# Patient Record
Sex: MALE | Race: BLACK OR AFRICAN AMERICAN | NOT HISPANIC OR LATINO | Employment: OTHER | URBAN - METROPOLITAN AREA
[De-identification: names, ages, dates, MRNs, and addresses within clinical notes are randomized per-mention and may not be internally consistent; named-entity substitution may affect disease eponyms.]

---

## 2023-05-18 ENCOUNTER — APPOINTMENT (OUTPATIENT)
Dept: PRIMARY CARE | Facility: CLINIC | Age: 73
End: 2023-05-18
Payer: MEDICARE

## 2023-06-12 ENCOUNTER — APPOINTMENT (OUTPATIENT)
Dept: PRIMARY CARE | Facility: CLINIC | Age: 73
End: 2023-06-12
Payer: MEDICARE

## 2023-07-10 LAB
CHOLESTEROL (MG/DL) IN SER/PLAS: 155 MG/DL (ref 0–199)
CHOLESTEROL IN HDL (MG/DL) IN SER/PLAS: 43.5 MG/DL
CHOLESTEROL/HDL RATIO: 3.6
LDL: 98 MG/DL (ref 0–99)
TRIGLYCERIDE (MG/DL) IN SER/PLAS: 70 MG/DL (ref 0–149)
VLDL: 14 MG/DL (ref 0–40)

## 2023-12-04 ENCOUNTER — TELEPHONE (OUTPATIENT)
Dept: PRIMARY CARE | Facility: HOSPITAL | Age: 73
End: 2023-12-04
Payer: MEDICARE

## 2023-12-04 DIAGNOSIS — I48.91 ATRIAL FIBRILLATION, UNSPECIFIED TYPE (MULTI): Primary | ICD-10-CM

## 2023-12-04 RX ORDER — APIXABAN 5 MG/1
5 TABLET, FILM COATED ORAL 2 TIMES DAILY
Qty: 60 TABLET | Refills: 2 | Status: SHIPPED | OUTPATIENT
Start: 2023-12-04 | End: 2024-02-28 | Stop reason: SDUPTHER

## 2023-12-04 RX ORDER — APIXABAN 5 MG/1
5 TABLET, FILM COATED ORAL 2 TIMES DAILY
COMMUNITY
End: 2023-12-04 | Stop reason: SDUPTHER

## 2023-12-11 ENCOUNTER — HOSPITAL ENCOUNTER (OUTPATIENT)
Dept: RADIOLOGY | Facility: HOSPITAL | Age: 73
Discharge: HOME | End: 2023-12-11
Payer: MEDICARE

## 2023-12-11 DIAGNOSIS — C61 MALIGNANT NEOPLASM OF PROSTATE (MULTI): ICD-10-CM

## 2023-12-11 PROCEDURE — 3430000001 HC RX 343 DIAGNOSTIC RADIOPHARMACEUTICALS: Performed by: INTERNAL MEDICINE

## 2023-12-11 PROCEDURE — 78815 PET IMAGE W/CT SKULL-THIGH: CPT | Performed by: RADIOLOGY

## 2023-12-11 PROCEDURE — A9800 HC RX 343 DIAGNOSTIC RADIOPHARMACEUTICALS: HCPCS | Performed by: INTERNAL MEDICINE

## 2023-12-11 PROCEDURE — 78815 PET IMAGE W/CT SKULL-THIGH: CPT | Mod: PI

## 2023-12-11 RX ADMIN — KIT FOR THE PREPARATION OF GALLIUM GA 68 GOZETOTIDE 5.83 MILLICURIE: 25 INJECTION, POWDER, LYOPHILIZED, FOR SOLUTION INTRAVENOUS at 09:48

## 2023-12-14 DIAGNOSIS — C61 PROSTATE CANCER (MULTI): ICD-10-CM

## 2023-12-18 DIAGNOSIS — I10 HYPERTENSION, UNSPECIFIED TYPE: Primary | ICD-10-CM

## 2023-12-19 RX ORDER — AMLODIPINE BESYLATE 10 MG/1
10 TABLET ORAL DAILY
Qty: 90 TABLET | Refills: 3 | Status: SHIPPED | OUTPATIENT
Start: 2023-12-19 | End: 2024-02-08 | Stop reason: SDUPTHER

## 2023-12-20 ENCOUNTER — APPOINTMENT (OUTPATIENT)
Dept: HEMATOLOGY/ONCOLOGY | Facility: HOSPITAL | Age: 73
End: 2023-12-20
Payer: MEDICARE

## 2023-12-21 ENCOUNTER — OFFICE VISIT (OUTPATIENT)
Dept: HEMATOLOGY/ONCOLOGY | Facility: HOSPITAL | Age: 73
End: 2023-12-21
Payer: MEDICARE

## 2023-12-21 ENCOUNTER — LAB (OUTPATIENT)
Dept: LAB | Facility: HOSPITAL | Age: 73
End: 2023-12-21
Payer: MEDICARE

## 2023-12-21 VITALS
BODY MASS INDEX: 28.29 KG/M2 | SYSTOLIC BLOOD PRESSURE: 137 MMHG | HEART RATE: 57 BPM | DIASTOLIC BLOOD PRESSURE: 73 MMHG | WEIGHT: 208.56 LBS | RESPIRATION RATE: 16 BRPM | OXYGEN SATURATION: 99 % | TEMPERATURE: 97.7 F

## 2023-12-21 DIAGNOSIS — C61 PROSTATE CANCER (MULTI): ICD-10-CM

## 2023-12-21 DIAGNOSIS — C61 MALIGNANT NEOPLASM OF PROSTATE (MULTI): Primary | ICD-10-CM

## 2023-12-21 LAB
ALBUMIN SERPL BCP-MCNC: 4.2 G/DL (ref 3.4–5)
ALP SERPL-CCNC: 75 U/L (ref 33–136)
ALT SERPL W P-5'-P-CCNC: 15 U/L (ref 10–52)
ANION GAP SERPL CALC-SCNC: 10 MMOL/L (ref 10–20)
AST SERPL W P-5'-P-CCNC: 17 U/L (ref 9–39)
BILIRUB SERPL-MCNC: 0.7 MG/DL (ref 0–1.2)
BUN SERPL-MCNC: 14 MG/DL (ref 6–23)
CALCIUM SERPL-MCNC: 9 MG/DL (ref 8.6–10.3)
CHLORIDE SERPL-SCNC: 103 MMOL/L (ref 98–107)
CO2 SERPL-SCNC: 35 MMOL/L (ref 21–32)
CREAT SERPL-MCNC: 1.04 MG/DL (ref 0.5–1.3)
GFR SERPL CREATININE-BSD FRML MDRD: 76 ML/MIN/1.73M*2
GLUCOSE SERPL-MCNC: 116 MG/DL (ref 74–99)
HOLD SPECIMEN: NORMAL
POTASSIUM SERPL-SCNC: 3.6 MMOL/L (ref 3.5–5.3)
PROT SERPL-MCNC: 7.4 G/DL (ref 6.4–8.2)
PSA SERPL-MCNC: 5.97 NG/ML
SODIUM SERPL-SCNC: 144 MMOL/L (ref 136–145)
TESTOST SERPL-MCNC: 603 NG/DL (ref 240–1000)

## 2023-12-21 PROCEDURE — 84403 ASSAY OF TOTAL TESTOSTERONE: CPT

## 2023-12-21 PROCEDURE — 36415 COLL VENOUS BLD VENIPUNCTURE: CPT

## 2023-12-21 PROCEDURE — 99213 OFFICE O/P EST LOW 20 MIN: CPT | Performed by: INTERNAL MEDICINE

## 2023-12-21 PROCEDURE — 84153 ASSAY OF PSA TOTAL: CPT

## 2023-12-21 PROCEDURE — 80053 COMPREHEN METABOLIC PANEL: CPT

## 2023-12-21 PROCEDURE — 1126F AMNT PAIN NOTED NONE PRSNT: CPT | Performed by: INTERNAL MEDICINE

## 2023-12-21 PROCEDURE — 1159F MED LIST DOCD IN RCRD: CPT | Performed by: INTERNAL MEDICINE

## 2023-12-21 PROCEDURE — 1036F TOBACCO NON-USER: CPT | Performed by: INTERNAL MEDICINE

## 2023-12-21 PROCEDURE — 3008F BODY MASS INDEX DOCD: CPT | Performed by: INTERNAL MEDICINE

## 2023-12-21 RX ORDER — LOSARTAN POTASSIUM 100 MG/1
100 TABLET ORAL DAILY
COMMUNITY
End: 2024-02-08 | Stop reason: WASHOUT

## 2023-12-21 RX ORDER — CHOLECALCIFEROL (VITAMIN D3) 50 MCG
1 TABLET ORAL DAILY
COMMUNITY
Start: 2021-01-15

## 2023-12-21 RX ORDER — TAMSULOSIN HYDROCHLORIDE 0.4 MG/1
0.8 CAPSULE ORAL DAILY
COMMUNITY
End: 2024-02-06 | Stop reason: SDUPTHER

## 2023-12-21 RX ORDER — ASCORBIC ACID 500 MG
1 TABLET ORAL DAILY
COMMUNITY
Start: 2016-02-08 | End: 2024-02-29 | Stop reason: WASHOUT

## 2023-12-21 RX ORDER — PRAVASTATIN SODIUM 40 MG/1
40 TABLET ORAL NIGHTLY
COMMUNITY
End: 2024-02-08 | Stop reason: SDUPTHER

## 2023-12-21 RX ORDER — METOPROLOL TARTRATE 25 MG/1
25 TABLET, FILM COATED ORAL 2 TIMES DAILY
COMMUNITY
End: 2024-02-28 | Stop reason: SDUPTHER

## 2023-12-21 ASSESSMENT — PAIN SCALES - GENERAL: PAINLEVEL: 0-NO PAIN

## 2023-12-21 ASSESSMENT — PATIENT HEALTH QUESTIONNAIRE - PHQ9
1. LITTLE INTEREST OR PLEASURE IN DOING THINGS: NOT AT ALL
SUM OF ALL RESPONSES TO PHQ9 QUESTIONS 1 AND 2: 0
2. FEELING DOWN, DEPRESSED OR HOPELESS: NOT AT ALL

## 2023-12-21 ASSESSMENT — COLUMBIA-SUICIDE SEVERITY RATING SCALE - C-SSRS
6. HAVE YOU EVER DONE ANYTHING, STARTED TO DO ANYTHING, OR PREPARED TO DO ANYTHING TO END YOUR LIFE?: NO
1. IN THE PAST MONTH, HAVE YOU WISHED YOU WERE DEAD OR WISHED YOU COULD GO TO SLEEP AND NOT WAKE UP?: NO
2. HAVE YOU ACTUALLY HAD ANY THOUGHTS OF KILLING YOURSELF?: NO

## 2023-12-22 NOTE — PROGRESS NOTES
Patient ID: Christiano Masters is a 73 y.o. male.    Subjective    HPI  Patient is a 73 year old male with prostate cancer with. BCR after local definitive therapy with RT + 18 months of ADT for high-risk disease (iPSA11/cT1/GS8).    Treatment history:  Initially diagnosed Gl 4+4 iPSA 10.99 dx June 2014.    Started ADT in 2014 (ADT #1 7/25/14) and finished IMRT Jan 2015. Initially planned  for 2yrs ADT to be completed in June 2016 however due to weight gain and increased blood pressure,  stopped at 18 months  of ADT, last dose on 1/2016.     Today patient reports feeling well overall. He has no complaints. His main concern is what to do about his rising PSA and recent scan findings.  He denies any pain, SOB, diarrhea, constipation, fevers, chills.    PSA trend: 3.02 -> 2.84 -> 3.46 -> 5.97    PET 12/11/23:  IMPRESSION:  Intensely focal increased ileum 68 PSMA activity in the left anterior  transitional zone of the prostate gland. Finding would be consistent  with an active neoplastic process.    No other definite areas of abnormal PSMA activity to suggest an  active neoplastic/metastatic process are identified.      Objective         Physical Exam  GEN:  A&Ox3, no acute distress, appears comfortable.  Conversational and appropriate.  No confusion or gross mental status changes.  EYES: EOMI, non-injected sclera.  ENT: Moist mucous membranes, no apparent injuries or lesions.  CARDIO: Normal rate and regular rhythm. No murmurs, rubs, or gallops.  .  PULM: LCTAB, no accessory muscle use  SKIN: Warm and dry, no rashes or lesions.  NEURO: Cranial nerves II-XII grossly intact.   PSYCH: Appropriate mood and behavior, converses and responds appropriately during exam      Assessment/Plan   73 year old male with prostate cancer with BCR after local definitive therapy with RT + 18 months of ADT for high-risk disease (iPSA11/cT1/GS8) presenting for discussion of rising PSA and PET scan findings consistent with local active  neoplastic activity in the prostate gland. Extensive discussion was held today with patient regarding potential next steps, including the need for discussion at tumor board and the possibility of surgery or radiation after biopsy, given patients overall good health.    Plan:  - will discuss at next tumor board meeting regarding possibility of therapies for local control after prior RT.  - pending  tumor board outcomes, next step would be MRI of the prostate for possibility of biopsy, then meeting with urology and radiation oncology to discuss surgical and radiation options.          Darshan Hassan MD    TEACHING ATTENDING ATTESTATION    I saw and evaluated this patient with Resident/Fellow. I reviewed the resident/fellow's documentation and discussed the patient with the resident/fellow. I agree with the resident/fellow's medical decision making as documented in the note.     BCR after local definitive therapy with Rt and 18 months of ADT  T recovered and PSA rising  PET PSMA showed only uptake in gland with no distant mets  Discussed role of salvage local approaches including RP vs. Cryo vs re-radiation  Need to discuss in [facility] - if we proceed he will need MRI prostate and Prostate bx before we can define next steps  Pt verbalized agreement, have questions and wished to proceed as above      Darshan Hassan MD, FACP  Chief, Solid Tumor Oncology Division   Medical Oncology  Professor of Medicine and Urology  /[facility]

## 2024-01-18 ENCOUNTER — OFFICE VISIT (OUTPATIENT)
Dept: HEMATOLOGY/ONCOLOGY | Facility: HOSPITAL | Age: 74
End: 2024-01-18
Payer: MEDICARE

## 2024-01-18 VITALS
OXYGEN SATURATION: 100 % | WEIGHT: 212.3 LBS | RESPIRATION RATE: 18 BRPM | BODY MASS INDEX: 28.79 KG/M2 | HEART RATE: 63 BPM | DIASTOLIC BLOOD PRESSURE: 79 MMHG | SYSTOLIC BLOOD PRESSURE: 145 MMHG | TEMPERATURE: 97 F

## 2024-01-18 DIAGNOSIS — C61 MALIGNANT NEOPLASM OF PROSTATE (MULTI): ICD-10-CM

## 2024-01-18 PROCEDURE — 1125F AMNT PAIN NOTED PAIN PRSNT: CPT | Performed by: NURSE PRACTITIONER

## 2024-01-18 PROCEDURE — 3008F BODY MASS INDEX DOCD: CPT | Performed by: NURSE PRACTITIONER

## 2024-01-18 PROCEDURE — 99213 OFFICE O/P EST LOW 20 MIN: CPT | Performed by: NURSE PRACTITIONER

## 2024-01-18 PROCEDURE — 1036F TOBACCO NON-USER: CPT | Performed by: NURSE PRACTITIONER

## 2024-01-18 ASSESSMENT — PAIN SCALES - GENERAL: PAINLEVEL: 8

## 2024-01-18 NOTE — PROGRESS NOTES
Patient ID: Christiano Masters is a 73 y.o. male.    Patient is a 73 year old male with prostate cancer with. BCR after local definitive therapy with RT + 18 months of ADT for high-risk disease (iPSA11/cT1/GS8).     Treatment history:  Initially diagnosed Gl 4+4 iPSA 10.99 dx June 2014.    Started ADT in 2014 (ADT #1 7/25/14) and finished IMRT Jan 2015. Initially planned  for 2yrs ADT to be completed in June 2016 however due to weight gain and increased blood pressure,  stopped at 18 months  of ADT, last dose on 1/2016.       Subjective    HPI    Some decrease in energy at times which he attributes to having had a procedure for afib. He can do his ADLs etc.   Appetite is good.   Denies cough/fever.   Denies n/v.   Denies diarrhea.   Had a stuffy nose last night.   Notes low back pain x months.   Denies dysuria.       Objective    BSA: 2.21 meters squared  /79 (BP Location: Left arm, Patient Position: Sitting, BP Cuff Size: Large adult)   Pulse 63   Temp 36.1 °C (97 °F) (Temporal)   Resp 18   Wt 96.3 kg (212 lb 4.9 oz)   SpO2 100%   BMI 28.79 kg/m²      Physical Exam  Eyes:      General: No scleral icterus.  Neurological:      Mental Status: He is alert and oriented to person, place, and time.      Comments: Ambulates independently          Performance Status:  Symptomatic; fully ambulatory    Lab Results   Component Value Date    PSA 5.97 (H) 12/21/2023    PSA 3.46 08/17/2023    PSA 2.84 05/22/2023       Lab Results   Component Value Date    GLUCOSE 116 (H) 12/21/2023    CALCIUM 9.0 12/21/2023     12/21/2023    K 3.6 12/21/2023    CO2 35 (H) 12/21/2023     12/21/2023    BUN 14 12/21/2023    CREATININE 1.04 12/21/2023     Lab Results   Component Value Date    TESTOSTERONE 603 12/21/2023     Lab Results   Component Value Date    ALT 15 12/21/2023    AST 17 12/21/2023     (H) 05/19/2022    ALKPHOS 75 12/21/2023    BILITOT 0.7 12/21/2023 12/11/23 PET:   IMPRESSION:  Intensely focal  increased ileum 68 PSMA activity in the left anterior  transitional zone of the prostate gland. Finding would be consistent  with an active neoplastic process.      No other definite areas of abnormal PSMA activity to suggest an  active neoplastic/metastatic process are identified..          Assessment/Plan   Per Dr. Hassan's note from 12/21: BCR after local definitive therapy with Rt and 18 months of ADT  T recovered and PSA rising. PET PSMA showed only uptake in gland with no distant mets  Discussed role of salvage local approaches including RP vs. Cryo vs re-radiation  Need to discuss in [facility] - if we proceed he will need MRI prostate and Prostate bx before we can define next steps.    Discussed case with Dr. Hassan and he said recommendation is for MRI prostate and then for him to see Dr. Narvaez of radiation oncology.     Will tentatively have patient follow up with Dr. Hassan in about 3 mos with labs.     Encouraged patient to follow up with PCP re: chronic low back pain.     Patient provided with contact and last note from [facility] of cardiology as he was inquiring about follow up there. Encouraged him to contact that office for an appt/recommendations.      Diagnoses and all orders for this visit:  Malignant neoplasm of prostate (CMS/HCC)  -     Clinic Appointment Request Follow Up; KENNETH HASSAN  -     MR treatment planning prostate pelvis; Future  -     Referral to Radiation Oncology; Future  -     Clinic Appointment Request KENNETH HASSAN; Future  -     Prostate Specific Antigen; Future  -     Testosterone; Future           MARCIE Lopez-CNP

## 2024-02-02 ENCOUNTER — HOSPITAL ENCOUNTER (OUTPATIENT)
Dept: RADIOLOGY | Facility: HOSPITAL | Age: 74
Discharge: HOME | End: 2024-02-02
Payer: MEDICARE

## 2024-02-02 ENCOUNTER — TELEPHONE (OUTPATIENT)
Dept: PRIMARY CARE | Facility: HOSPITAL | Age: 74
End: 2024-02-02
Payer: MEDICARE

## 2024-02-02 DIAGNOSIS — C61 MALIGNANT NEOPLASM OF PROSTATE (MULTI): ICD-10-CM

## 2024-02-02 NOTE — TELEPHONE ENCOUNTER
Patient is asking for a refill on losartan potassium. Please give him enough to last until 2/8 visit

## 2024-02-05 ENCOUNTER — TELEPHONE (OUTPATIENT)
Dept: RADIATION ONCOLOGY | Facility: HOSPITAL | Age: 74
End: 2024-02-05
Payer: MEDICARE

## 2024-02-05 NOTE — TELEPHONE ENCOUNTER
Called pt. to remind of appointment on 2/6/2024 at 8:00 am with    Pt answered and confirmed appointment.

## 2024-02-06 ENCOUNTER — HOSPITAL ENCOUNTER (OUTPATIENT)
Dept: RADIATION ONCOLOGY | Facility: HOSPITAL | Age: 74
Setting detail: RADIATION/ONCOLOGY SERIES
Discharge: HOME | End: 2024-02-06
Payer: MEDICARE

## 2024-02-06 VITALS
HEART RATE: 53 BPM | HEIGHT: 72 IN | TEMPERATURE: 96.8 F | DIASTOLIC BLOOD PRESSURE: 98 MMHG | RESPIRATION RATE: 18 BRPM | WEIGHT: 215.17 LBS | SYSTOLIC BLOOD PRESSURE: 170 MMHG | BODY MASS INDEX: 29.14 KG/M2 | OXYGEN SATURATION: 98 %

## 2024-02-06 DIAGNOSIS — N13.9 URINARY OBSTRUCTION: Primary | ICD-10-CM

## 2024-02-06 DIAGNOSIS — C61 MALIGNANT NEOPLASM OF PROSTATE (MULTI): ICD-10-CM

## 2024-02-06 PROCEDURE — 99205 OFFICE O/P NEW HI 60 MIN: CPT | Performed by: STUDENT IN AN ORGANIZED HEALTH CARE EDUCATION/TRAINING PROGRAM

## 2024-02-06 PROCEDURE — 99215 OFFICE O/P EST HI 40 MIN: CPT | Performed by: STUDENT IN AN ORGANIZED HEALTH CARE EDUCATION/TRAINING PROGRAM

## 2024-02-06 RX ORDER — TAMSULOSIN HYDROCHLORIDE 0.4 MG/1
0.8 CAPSULE ORAL DAILY
Qty: 30 CAPSULE | Refills: 2 | Status: SHIPPED | OUTPATIENT
Start: 2024-02-06 | End: 2024-02-08 | Stop reason: SDUPTHER

## 2024-02-06 SDOH — ECONOMIC STABILITY: FOOD INSECURITY: WITHIN THE PAST 12 MONTHS, THE FOOD YOU BOUGHT JUST DIDN'T LAST AND YOU DIDN'T HAVE MONEY TO GET MORE.: NEVER TRUE

## 2024-02-06 SDOH — ECONOMIC STABILITY: FOOD INSECURITY: WITHIN THE PAST 12 MONTHS, YOU WORRIED THAT YOUR FOOD WOULD RUN OUT BEFORE YOU GOT MONEY TO BUY MORE.: NEVER TRUE

## 2024-02-06 ASSESSMENT — COLUMBIA-SUICIDE SEVERITY RATING SCALE - C-SSRS
1. IN THE PAST MONTH, HAVE YOU WISHED YOU WERE DEAD OR WISHED YOU COULD GO TO SLEEP AND NOT WAKE UP?: NO
6. HAVE YOU EVER DONE ANYTHING, STARTED TO DO ANYTHING, OR PREPARED TO DO ANYTHING TO END YOUR LIFE?: NO
2. HAVE YOU ACTUALLY HAD ANY THOUGHTS OF KILLING YOURSELF?: NO

## 2024-02-06 ASSESSMENT — ENCOUNTER SYMPTOMS
PSYCHIATRIC NEGATIVE: 1
GASTROINTESTINAL NEGATIVE: 1
CONSTITUTIONAL NEGATIVE: 1
DEPRESSION: 0
NEUROLOGICAL NEGATIVE: 1
BACK PAIN: 1
ENDOCRINE NEGATIVE: 1
RESPIRATORY NEGATIVE: 1
CARDIOVASCULAR NEGATIVE: 1
OCCASIONAL FEELINGS OF UNSTEADINESS: 0
LOSS OF SENSATION IN FEET: 0
EYES NEGATIVE: 1
HEMATOLOGIC/LYMPHATIC NEGATIVE: 1

## 2024-02-06 ASSESSMENT — PATIENT HEALTH QUESTIONNAIRE - PHQ9
SUM OF ALL RESPONSES TO PHQ9 QUESTIONS 1 AND 2: 0
2. FEELING DOWN, DEPRESSED OR HOPELESS: NOT AT ALL
1. LITTLE INTEREST OR PLEASURE IN DOING THINGS: NOT AT ALL

## 2024-02-06 ASSESSMENT — PAIN SCALES - GENERAL: PAINLEVEL: 0-NO PAIN

## 2024-02-06 NOTE — PROGRESS NOTES
Staff Physician: Trisha Narvaez MD PhD  Referring Physician: Yaneli Fagan APRN-CNP  Date of Service: 2/6/2024  MRN: [id number]    RADIATION ONCOLOGY CONSULT NOTE    IDENTIFYING DATA:   Cancer Staging   Malignant neoplasm of prostate (CMS/HCC)  Staging form: Prostate, AJCC 8th Edition  - Clinical stage from 6/11/2014: Stage IIC (cT1c, cN0, cM0, PSA: 11, Grade Group: 4) - Signed by Trisha Narvaez MD PhD on 2/6/2024    Problem List Items Addressed This Visit       Malignant neoplasm of prostate (CMS/HCC)    Relevant Orders    Referral to Radiation Oncology     Other Visit Diagnoses       Urinary obstruction    -  Primary    Relevant Medications    tamsulosin (Flomax) 0.4 mg 24 hr capsule        Mr. Christiano Masters is a 73-year-old with a history of prostate adenocarcinoma with biochemical recurrence, referred by Yaneli Fagan APRN-CNP, for evaluation and discussion of treatment recommendations.    HISTORY OF PRESENT ILLNESS:  He has a history of MGUS, 11/30/2022 bone survey without evidence of lytic lesions.    6/11/2014 systematic prostate biopsy noted [place] 4+4, PNI positive    11/10/2014 to 1/13/2015 completed 79.2Gy in 44 fractions (45Gy in 25fx to prostate and SV, 34.2Gy boost to prostate).    01/2016 last injection of ADT. Planned for 24m, stopped at 18m due to side effects (weight gain, HTN).    His PSA has been slowly rising, most recently 5.97 12/21/2023, testosterone 603     PSA   Latest Ref Rng <=4.00 ng/mL   9/17/2019 0.34    6/15/2020 0.37    12/15/2020 0.24    6/17/2021 0.47    12/20/2021 1.52    5/19/2022 1.75    7/7/2022 1.93    2/22/2023 3.02    5/22/2023 2.84    8/17/2023 3.46    12/21/2023 5.97 (H)      12/11/2023 [place] PSMA PET/CT noted uptake in the left anterior TZ, SUV 5.7, without other areas of focal uptake.    2/2/2024 treatment planning MRI without contrast.    Today, Mr. Christiano Masters is here by himself, with his daughter on the phone.  Symptomatically, he reports:    1.  Full  "independence in activities of daily living.  2.  No dyspnea on exertion.   3.  Normal appetite. No unintentional weight loss.   4.  Pain score: 0/10 - currently no pain, but has h/o chronic back pain, managed with tylenol PRN  5.  IPSS (International Prostate Symptom Score):   9 / 35, bother 3   - He is not experiencing urinary incontinence. Reports that he only takes flomax \"as needed\".    - He is not experiencing dysuria, hematuria, flank pain.   - Nocturia x4, incomplete emptying about half the time   6.  Sexual function (ALANNA) Score:  16 / 25    - Use of erectile dysfunction medications:  None  7.  Bowel function:  normal, 2-3x a day   - Denies hematochezia or pain.    - He does not have a history of hemorrhoids.    - He does not have a history of inflammatory bowel disease (Crohn's, Ulcerative Colitis).  - 03/2023 colonoscopy noted localized mild inflammation in the distal rectum, possibly 2/2 radiation proctitis.   8. He denies a personal history of MI or stroke.   Vitals 170/98 seeing PCP on 2/8    PAST MEDICAL HISTORY:  Past Medical History:   Diagnosis Date    A-fib (CMS/[facility])     on Eliquis    Hypercholesteremia     Hypertension     Personal history of irradiation     Personal history of malignant neoplasm of prostate     History of malignant neoplasm of prostate     PAST SURGICAL HISTORY:  Past Surgical History:   Procedure Laterality Date    OTHER SURGICAL HISTORY  08/26/2019    Colonoscopy    OTHER SURGICAL HISTORY  11/15/2021    Hernia repair     ALLERGIES:  No Known Allergies  MEDICATIONS:    Current Outpatient Medications:     amLODIPine (Norvasc) 10 mg tablet, TAKE 1 TABLET BY MOUTH EVERY DAY, Disp: 90 tablet, Rfl: 3    ascorbic acid (Vitamin C) 500 mg tablet, Take 1 tablet (500 mg) by mouth once daily., Disp: , Rfl:     cholecalciferol (Vitamin D-3) 50 MCG (2000 UT) tablet, Take 1 tablet (2,000 Units) by mouth once daily., Disp: , Rfl:     Eliquis 5 mg tablet, Take 1 tablet (5 mg) by mouth 2 times a " day., Disp: 60 tablet, Rfl: 2    losartan (Cozaar) 100 mg tablet, Take 1 tablet (100 mg) by mouth once daily., Disp: , Rfl:     metoprolol tartrate (Lopressor) 25 mg tablet, Take 1 tablet (25 mg) by mouth 2 times a day., Disp: , Rfl:     pravastatin (Pravachol) 40 mg tablet, Take 1 tablet (40 mg) by mouth once daily at bedtime., Disp: , Rfl:     tamsulosin (Flomax) 0.4 mg 24 hr capsule, Take 2 capsules (0.8 mg) by mouth once daily., Disp: 30 capsule, Rfl: 2   SOCIAL HISTORY:  Social History     Tobacco Use    Smoking status: Never    Smokeless tobacco: Never   Substance Use Topics    Alcohol use: Never     FAMILY HISTORY:  No family history on file.    REVIEW OF SYSTEMS:  A 10 point review of systems was reviewed with pertinent positives and negatives noted in HPI. All other systems have been reviewed and are negative.  Review of Systems   Constitutional: Negative.    HENT:  Negative.     Eyes: Negative.    Respiratory: Negative.     Cardiovascular: Negative.    Gastrointestinal: Negative.    Endocrine: Negative.    Genitourinary:  Positive for nocturia.    Musculoskeletal:  Positive for back pain (chronic back pain).   Skin: Negative.    Neurological: Negative.    Hematological: Negative.    Psychiatric/Behavioral: Negative.       RADIATION SCREENING QUESTIONS:  Prior radiation therapy: Yes, describe: per HPI  Pacemaker: No  Other implantable devices: No  Connective tissue disease: No    PHYSICAL EXAMINATION:  BP (!) 170/98 (BP Location: Right arm, Patient Position: Sitting, BP Cuff Size: Small adult)   Pulse 53   Temp 36 °C (96.8 °F) (Skin)   Resp 18   Ht 1.829 m (6')   Wt 97.6 kg (215 lb 2.7 oz)   SpO2 98%   BMI 29.18 kg/m²   Constitutional: well developed, no distress, alert & oriented, cooperative  Eyes: pupils equal round and reactive to light, extraocular movements intact  Respiratory: normal work of breathing  Extremities: no clubbing or edema  Psychological: normal affect    PERFORMANCE  STATUS:  KPS/ECO, Fully active, able to carry on all pre-disease performed without restriction (ECOG equivalent 0)    LABORATORY AND IMAGING DATA:  Imaging: All imaging was personally reviewed and interpreted in clinic. Findings as per HPI and EMR.    Laboratory/Pathology:  All pertinent labs and pathology were personally reviewed and interpreted in clinic. Findings as per HPI and EMR.  Lab Results   Component Value Date    PSA 5.97 (H) 2023    PSA 3.46 2023    PSA 2.84 2023    PSA 3.02 2023    PSA 1.93 2022    PSA 1.75 2022    PSA 1.52 2021    PSA 0.47 2021    PSA 0.24 12/15/2020    PSA 0.37 06/15/2020     Lab Results   Component Value Date    TESTOSTERONE 603 2023     IMPRESSION:  73 year-old gentleman with a history of high risk prostate adenocarcinoma (iPSA 11, [place] 4+4, cN0M0 by conventional scans) diagnosed in 2014, treated with 18m ADT (planned for 24m, stopped due to weight gain and hypertension) and definitive radiation to the prostate (79.2Gy in 44 fractions, completed 2015). Lost to follow up, lowest PSA was 0.26, with BCR in 2022 (PSA 3), PSA most recently 5.97 with PSMA PET/CT suggesting local prostate recurrence (left anterior TZ, SUV 5.5).  PSADT currently ~10 months. He has chronic lower back pain.     PLAN:  I discussed salvage options for recurrent prostate cancer in the prostate, including prostatectomy or radiation. He is very reluctant to repeat ADT. The purpose of RT is to deliver an ablative dose to the areas of recurrence with the goal of improving local control. I recommend biopsy confirmation of disease first. I discussed placement of spaceOAR placement, if feasible, to decrease the risk of rectal toxicity. He understands that spacer placement may not always be feasible in an irradiated prostate. Logistics of radiation therapy including CT-based simulation and bowel and bladder preparation were discussed, as well as  possible short- and long-term side effects including fatigue, toxicities to the bladder, urethra, and  rectum, sexual dysfunction, and secondary malignancy in the radiation field. I emphasized that re-irradiation poses higher risks of long-term side effects to the rectum and urinary tract and discussed the risk of wound healing complications, fistula formation,  proctitis, rectal stricture, and urethral stricture.      Mr. Masters and his daughter asked a number of excellent questions that demonstrated good understanding, and would like to proceed with prostate biopsy.    - re-start flomax daily  - targeted and systematic prostate biopsy  - will follow up after pathology results     Thank you for the opportunity to participate in the care of this pleasant gentleman.    Trisha Narvaez MD PhD  , Radiation Oncology

## 2024-02-08 ENCOUNTER — PREP FOR PROCEDURE (OUTPATIENT)
Dept: UROLOGY | Facility: HOSPITAL | Age: 74
End: 2024-02-08
Payer: MEDICARE

## 2024-02-08 ENCOUNTER — OFFICE VISIT (OUTPATIENT)
Dept: PRIMARY CARE | Facility: HOSPITAL | Age: 74
End: 2024-02-08
Payer: MEDICARE

## 2024-02-08 VITALS
OXYGEN SATURATION: 96 % | WEIGHT: 217.6 LBS | SYSTOLIC BLOOD PRESSURE: 155 MMHG | TEMPERATURE: 98.8 F | HEIGHT: 72 IN | DIASTOLIC BLOOD PRESSURE: 84 MMHG | HEART RATE: 63 BPM | BODY MASS INDEX: 29.47 KG/M2

## 2024-02-08 DIAGNOSIS — F17.210 NICOTINE DEPENDENCE, CIGARETTES, UNCOMPLICATED: ICD-10-CM

## 2024-02-08 DIAGNOSIS — E78.00 HYPERCHOLESTEROLEMIA: ICD-10-CM

## 2024-02-08 DIAGNOSIS — I10 HYPERTENSION, UNSPECIFIED TYPE: ICD-10-CM

## 2024-02-08 DIAGNOSIS — C61 MALIGNANT NEOPLASM OF PROSTATE (MULTI): Primary | ICD-10-CM

## 2024-02-08 DIAGNOSIS — R79.1 ABNORMAL COAGULATION PROFILE: ICD-10-CM

## 2024-02-08 DIAGNOSIS — G47.33 OBSTRUCTIVE SLEEP APNEA: ICD-10-CM

## 2024-02-08 DIAGNOSIS — Z12.2 ENCOUNTER FOR SCREENING FOR LUNG CANCER: Primary | ICD-10-CM

## 2024-02-08 DIAGNOSIS — N13.9 URINARY OBSTRUCTION: ICD-10-CM

## 2024-02-08 DIAGNOSIS — I48.20 CHRONIC ATRIAL FIBRILLATION (MULTI): ICD-10-CM

## 2024-02-08 PROBLEM — Z79.899 OTHER LONG TERM (CURRENT) DRUG THERAPY: Status: ACTIVE | Noted: 2023-06-12

## 2024-02-08 PROBLEM — M25.562 LEFT KNEE PAIN: Status: ACTIVE | Noted: 2024-02-08

## 2024-02-08 PROBLEM — R79.89 ABNORMAL THYROID SCREEN (BLOOD): Status: ACTIVE | Noted: 2024-02-08

## 2024-02-08 PROBLEM — D72.9 ABNORMAL WHITE BLOOD CELL (WBC) COUNT: Status: ACTIVE | Noted: 2024-02-08

## 2024-02-08 PROBLEM — R06.83 SNORING: Status: ACTIVE | Noted: 2024-02-08

## 2024-02-08 PROBLEM — D47.2 MONOCLONAL GAMMOPATHY: Status: ACTIVE | Noted: 2022-11-23

## 2024-02-08 PROBLEM — M25.512 LEFT SHOULDER PAIN: Status: ACTIVE | Noted: 2024-02-08

## 2024-02-08 PROBLEM — E83.42 HYPOMAGNESEMIA: Status: ACTIVE | Noted: 2024-02-08

## 2024-02-08 PROBLEM — R19.09 GROIN MASS: Status: ACTIVE | Noted: 2024-02-08

## 2024-02-08 PROBLEM — B18.2 HEPATITIS C, CHRONIC (MULTI): Status: ACTIVE | Noted: 2024-02-08

## 2024-02-08 PROBLEM — R42 DIZZY: Status: ACTIVE | Noted: 2022-11-06

## 2024-02-08 PROBLEM — F17.200 CURRENTLY SMOKES TOBACCO: Status: ACTIVE | Noted: 2023-02-27

## 2024-02-08 PROBLEM — R00.0 TACHYCARDIA, UNSPECIFIED: Status: ACTIVE | Noted: 2022-11-06

## 2024-02-08 PROBLEM — Z86.79 HISTORY OF CARDIOVASCULAR DISORDER: Status: ACTIVE | Noted: 2024-02-08

## 2024-02-08 PROBLEM — Z86.19 PERSONAL HISTORY OF OTHER INFECTIOUS AND PARASITIC DISEASES: Status: ACTIVE | Noted: 2023-02-27

## 2024-02-08 PROBLEM — M54.9 BACK PAIN: Status: ACTIVE | Noted: 2024-02-08

## 2024-02-08 PROBLEM — R69 DISEASE SUSPECTED: Status: ACTIVE | Noted: 2024-02-08

## 2024-02-08 PROBLEM — I48.0: Status: ACTIVE | Noted: 2024-02-08

## 2024-02-08 PROBLEM — F41.0 PANIC: Status: ACTIVE | Noted: 2024-02-08

## 2024-02-08 PROBLEM — I48.91 A-FIB (MULTI): Status: ACTIVE | Noted: 2024-02-08

## 2024-02-08 PROBLEM — H93.229 ECHO DIPLACUSIS: Status: ACTIVE | Noted: 2024-02-08

## 2024-02-08 PROBLEM — Z92.21 PERSONAL HISTORY OF ANTINEOPLASTIC CHEMOTHERAPY: Status: ACTIVE | Noted: 2023-02-27

## 2024-02-08 PROBLEM — R03.1: Status: ACTIVE | Noted: 2024-02-08

## 2024-02-08 PROBLEM — E87.6 HYPOKALEMIA: Status: ACTIVE | Noted: 2022-11-06

## 2024-02-08 PROBLEM — Z98.890 H/O COLONOSCOPY: Status: ACTIVE | Noted: 2024-02-08

## 2024-02-08 PROBLEM — J06.9 ACUTE UPPER RESPIRATORY INFECTION: Status: ACTIVE | Noted: 2024-02-08

## 2024-02-08 PROBLEM — K13.79 PAINFUL MOUTH: Status: ACTIVE | Noted: 2024-02-08

## 2024-02-08 PROBLEM — J90 PLEURAL EFFUSION, NOT ELSEWHERE CLASSIFIED: Status: ACTIVE | Noted: 2022-12-05

## 2024-02-08 PROBLEM — R19.7 ACUTE DIARRHEA: Status: ACTIVE | Noted: 2024-02-08

## 2024-02-08 PROBLEM — E85.4 ORGAN-LIMITED AMYLOIDOSIS (MULTI): Status: ACTIVE | Noted: 2023-02-27

## 2024-02-08 PROBLEM — I48.19 OTHER PERSISTENT ATRIAL FIBRILLATION (MULTI): Status: ACTIVE | Noted: 2023-08-24

## 2024-02-08 PROBLEM — R29.818 SUSPECTED SLEEP APNEA: Status: ACTIVE | Noted: 2024-02-08

## 2024-02-08 PROBLEM — I95.9 HYPOTENSION: Status: ACTIVE | Noted: 2024-02-08

## 2024-02-08 PROBLEM — N52.9 IMPOTENCE: Status: ACTIVE | Noted: 2024-02-08

## 2024-02-08 PROBLEM — C44.92 SCC (SQUAMOUS CELL CARCINOMA): Status: ACTIVE | Noted: 2024-02-08

## 2024-02-08 PROBLEM — G47.19 EXCESSIVE DAYTIME SLEEPINESS: Status: ACTIVE | Noted: 2024-02-08

## 2024-02-08 PROBLEM — R35.1 NOCTURIA: Status: ACTIVE | Noted: 2024-02-08

## 2024-02-08 PROBLEM — R89.9 ABNORMAL LABORATORY TEST: Status: ACTIVE | Noted: 2024-02-08

## 2024-02-08 PROBLEM — N52.9 MALE ERECTILE DISORDER: Status: ACTIVE | Noted: 2024-02-08

## 2024-02-08 PROBLEM — R40.0 DAYTIME SOMNOLENCE: Status: ACTIVE | Noted: 2024-02-08

## 2024-02-08 PROBLEM — R19.09 LUMP IN THE GROIN: Status: ACTIVE | Noted: 2024-02-08

## 2024-02-08 PROBLEM — Z86.79 HISTORY OF ASCVD: Status: ACTIVE | Noted: 2024-02-08

## 2024-02-08 PROBLEM — F41.9 ANXIETY: Status: ACTIVE | Noted: 2024-02-08

## 2024-02-08 PROBLEM — Z79.01 LONG TERM (CURRENT) USE OF ANTICOAGULANTS: Status: ACTIVE | Noted: 2023-06-12

## 2024-02-08 PROBLEM — D12.4 BENIGN NEOPLASM OF DESCENDING COLON: Status: ACTIVE | Noted: 2023-03-08

## 2024-02-08 PROBLEM — Z92.89 HISTORY OF SLEEP STUDY: Status: ACTIVE | Noted: 2024-02-08

## 2024-02-08 PROBLEM — I48.91 ATRIAL FIBRILLATION (MULTI): Status: ACTIVE | Noted: 2022-11-23

## 2024-02-08 PROBLEM — R74.8 ABNORMAL LIVER ENZYMES: Status: ACTIVE | Noted: 2022-12-05

## 2024-02-08 PROBLEM — R06.02 SOB (SHORTNESS OF BREATH): Status: ACTIVE | Noted: 2024-02-08

## 2024-02-08 PROBLEM — R06.00 DYSPNEA: Status: ACTIVE | Noted: 2024-02-08

## 2024-02-08 PROBLEM — Z92.3 PERSONAL HISTORY OF IRRADIATION: Status: ACTIVE | Noted: 2023-08-24

## 2024-02-08 PROBLEM — I34.0 NONRHEUMATIC MITRAL (VALVE) INSUFFICIENCY: Status: ACTIVE | Noted: 2022-12-05

## 2024-02-08 PROBLEM — R63.0 ANOREXIA: Status: ACTIVE | Noted: 2022-11-10

## 2024-02-08 PROBLEM — I48.92 ATRIAL FLUTTER (MULTI): Status: ACTIVE | Noted: 2024-02-08

## 2024-02-08 PROBLEM — N28.9 ABNORMAL RENAL FUNCTION: Status: ACTIVE | Noted: 2024-02-08

## 2024-02-08 PROBLEM — F17.200 TOBACCO USE DISORDER: Status: ACTIVE | Noted: 2024-02-08

## 2024-02-08 PROBLEM — R73.09 OTHER ABNORMAL GLUCOSE: Status: ACTIVE | Noted: 2024-02-08

## 2024-02-08 PROBLEM — Z85.46 HISTORY OF MALIGNANT NEOPLASM OF PROSTATE: Status: ACTIVE | Noted: 2023-02-27

## 2024-02-08 PROBLEM — R82.998 DARK URINE: Status: ACTIVE | Noted: 2024-02-08

## 2024-02-08 PROBLEM — G44.009 CLUSTER HEADACHE: Status: ACTIVE | Noted: 2023-01-05

## 2024-02-08 PROBLEM — F17.200 NICOTINE DEPENDENCE, UNSPECIFIED, UNCOMPLICATED: Status: ACTIVE | Noted: 2023-01-24

## 2024-02-08 PROBLEM — K21.9 GASTRO-ESOPHAGEAL REFLUX DISEASE WITHOUT ESOPHAGITIS: Status: ACTIVE | Noted: 2023-02-27

## 2024-02-08 PROBLEM — M25.512 PAIN OF LEFT SHOULDER REGION: Status: ACTIVE | Noted: 2024-02-08

## 2024-02-08 PROBLEM — D12.3 BENIGN NEOPLASM OF TRANSVERSE COLON: Status: ACTIVE | Noted: 2023-03-08

## 2024-02-08 PROBLEM — E66.811 OBESITY, CLASS I, BMI 30-34.9: Status: ACTIVE | Noted: 2024-02-08

## 2024-02-08 PROBLEM — R33.9 URINE RETENTION: Status: ACTIVE | Noted: 2023-09-19

## 2024-02-08 PROBLEM — K57.30 DIVERTICULOSIS OF LARGE INTESTINE WITHOUT PERFORATION OR ABSCESS WITHOUT BLEEDING: Status: ACTIVE | Noted: 2023-03-08

## 2024-02-08 PROBLEM — E55.9 VITAMIN D DEFICIENCY, UNSPECIFIED: Status: ACTIVE | Noted: 2024-02-08

## 2024-02-08 PROBLEM — K62.89 OTHER SPECIFIED DISEASES OF ANUS AND RECTUM: Status: ACTIVE | Noted: 2023-03-08

## 2024-02-08 PROBLEM — N28.9 DISORDER OF KIDNEY AND URETER, UNSPECIFIED: Status: ACTIVE | Noted: 2022-11-10

## 2024-02-08 PROBLEM — R76.8 HEPATITIS B ANTIBODY POSITIVE: Status: ACTIVE | Noted: 2024-02-08

## 2024-02-08 PROBLEM — K13.79 MOUTH PAIN: Status: ACTIVE | Noted: 2024-02-08

## 2024-02-08 PROBLEM — Z23 IMMUNIZATION DUE: Status: ACTIVE | Noted: 2024-02-08

## 2024-02-08 PROBLEM — N18.9 CHRONIC KIDNEY DISEASE, UNSPECIFIED: Status: ACTIVE | Noted: 2022-11-06

## 2024-02-08 PROBLEM — M54.42 ACUTE RIGHT-SIDED LOW BACK PAIN WITH LEFT-SIDED SCIATICA: Status: ACTIVE | Noted: 2023-07-10

## 2024-02-08 PROBLEM — I12.9 HYPERTENSIVE CHRONIC KIDNEY DISEASE WITH STAGE 1 THROUGH STAGE 4 CHRONIC KIDNEY DISEASE, OR UNSPECIFIED CHRONIC KIDNEY DISEASE: Status: ACTIVE | Noted: 2022-11-06

## 2024-02-08 PROBLEM — R06.02 INCREASING SHORTNESS OF BREATH: Status: ACTIVE | Noted: 2024-02-08

## 2024-02-08 PROBLEM — E26.09 PRIMARY HYPERALDOSTERONISM (MULTI): Status: ACTIVE | Noted: 2024-02-08

## 2024-02-08 PROBLEM — H66.90 EAR INFECTION: Status: ACTIVE | Noted: 2024-02-08

## 2024-02-08 PROBLEM — K40.90 INGUINAL HERNIA: Status: ACTIVE | Noted: 2024-02-08

## 2024-02-08 PROBLEM — N40.1 ENLARGED PROSTATE WITH LOWER URINARY TRACT SYMPTOMS (LUTS): Status: ACTIVE | Noted: 2024-02-08

## 2024-02-08 PROBLEM — R94.31 ABNORMAL EKG: Status: ACTIVE | Noted: 2024-02-08

## 2024-02-08 PROBLEM — R07.89 ATYPICAL CHEST PAIN: Status: ACTIVE | Noted: 2022-12-05

## 2024-02-08 PROBLEM — Z51.11 ENCOUNTER FOR ANTINEOPLASTIC CHEMOTHERAPY: Status: ACTIVE | Noted: 2023-02-22

## 2024-02-08 PROBLEM — I48.91 UNSPECIFIED ATRIAL FIBRILLATION (MULTI): Status: ACTIVE | Noted: 2024-02-08

## 2024-02-08 PROBLEM — E66.1 DRUG-INDUCED OBESITY: Status: ACTIVE | Noted: 2023-02-27

## 2024-02-08 PROBLEM — E66.9 OBESITY, CLASS I, BMI 30-34.9: Status: ACTIVE | Noted: 2024-02-08

## 2024-02-08 PROBLEM — E66.9 OBESITY, UNSPECIFIED: Status: ACTIVE | Noted: 2023-04-18

## 2024-02-08 PROBLEM — R63.0 APPETITE LOSS: Status: ACTIVE | Noted: 2024-02-08

## 2024-02-08 PROBLEM — I48.19 PERSISTENT ATRIAL FIBRILLATION (MULTI): Status: ACTIVE | Noted: 2024-02-08

## 2024-02-08 PROBLEM — I51.7 CARDIOMEGALY: Status: ACTIVE | Noted: 2022-12-05

## 2024-02-08 PROBLEM — I25.10 ATHEROSCLEROTIC HEART DISEASE OF NATIVE CORONARY ARTERY WITHOUT ANGINA PECTORIS: Status: ACTIVE | Noted: 2023-02-27

## 2024-02-08 PROCEDURE — 3077F SYST BP >= 140 MM HG: CPT

## 2024-02-08 PROCEDURE — 1036F TOBACCO NON-USER: CPT

## 2024-02-08 PROCEDURE — 3079F DIAST BP 80-89 MM HG: CPT

## 2024-02-08 PROCEDURE — 1125F AMNT PAIN NOTED PAIN PRSNT: CPT

## 2024-02-08 PROCEDURE — 1159F MED LIST DOCD IN RCRD: CPT

## 2024-02-08 PROCEDURE — 99214 OFFICE O/P EST MOD 30 MIN: CPT | Mod: GC

## 2024-02-08 PROCEDURE — 99214 OFFICE O/P EST MOD 30 MIN: CPT

## 2024-02-08 PROCEDURE — 3008F BODY MASS INDEX DOCD: CPT

## 2024-02-08 RX ORDER — LOSARTAN POTASSIUM AND HYDROCHLOROTHIAZIDE 12.5; 1 MG/1; MG/1
1 TABLET ORAL DAILY
Qty: 30 TABLET | Refills: 11 | Status: SHIPPED | OUTPATIENT
Start: 2024-02-08 | End: 2024-03-13 | Stop reason: SDUPTHER

## 2024-02-08 RX ORDER — PRAVASTATIN SODIUM 40 MG/1
40 TABLET ORAL NIGHTLY
Qty: 30 TABLET | Refills: 11 | Status: SHIPPED | OUTPATIENT
Start: 2024-02-08 | End: 2025-02-02

## 2024-02-08 RX ORDER — ALPRAZOLAM 0.25 MG/1
TABLET ORAL
COMMUNITY
Start: 2014-07-25 | End: 2024-02-29 | Stop reason: WASHOUT

## 2024-02-08 RX ORDER — TAMSULOSIN HYDROCHLORIDE 0.4 MG/1
0.8 CAPSULE ORAL DAILY
Qty: 60 CAPSULE | Refills: 5 | Status: SHIPPED | OUTPATIENT
Start: 2024-02-08 | End: 2024-02-08 | Stop reason: SDUPTHER

## 2024-02-08 RX ORDER — SODIUM CHLORIDE 9 MG/ML
100 INJECTION, SOLUTION INTRAVENOUS CONTINUOUS
Status: CANCELLED | OUTPATIENT
Start: 2024-02-08

## 2024-02-08 RX ORDER — AMLODIPINE BESYLATE 10 MG/1
10 TABLET ORAL DAILY
Qty: 90 TABLET | Refills: 3 | Status: SHIPPED | OUTPATIENT
Start: 2024-02-08 | End: 2024-02-28 | Stop reason: SDUPTHER

## 2024-02-08 RX ORDER — TAMSULOSIN HYDROCHLORIDE 0.4 MG/1
0.8 CAPSULE ORAL DAILY
Qty: 180 CAPSULE | Refills: 2 | Status: SHIPPED | OUTPATIENT
Start: 2024-02-08 | End: 2024-11-04

## 2024-02-08 ASSESSMENT — PATIENT HEALTH QUESTIONNAIRE - PHQ9
SUM OF ALL RESPONSES TO PHQ9 QUESTIONS 1 AND 2: 0
1. LITTLE INTEREST OR PLEASURE IN DOING THINGS: NOT AT ALL
2. FEELING DOWN, DEPRESSED OR HOPELESS: NOT AT ALL

## 2024-02-08 ASSESSMENT — COLUMBIA-SUICIDE SEVERITY RATING SCALE - C-SSRS
1. IN THE PAST MONTH, HAVE YOU WISHED YOU WERE DEAD OR WISHED YOU COULD GO TO SLEEP AND NOT WAKE UP?: NO
2. HAVE YOU ACTUALLY HAD ANY THOUGHTS OF KILLING YOURSELF?: NO
6. HAVE YOU EVER DONE ANYTHING, STARTED TO DO ANYTHING, OR PREPARED TO DO ANYTHING TO END YOUR LIFE?: NO

## 2024-02-08 ASSESSMENT — PAIN SCALES - GENERAL: PAINLEVEL: 6

## 2024-02-08 ASSESSMENT — ENCOUNTER SYMPTOMS
OCCASIONAL FEELINGS OF UNSTEADINESS: 0
LOSS OF SENSATION IN FEET: 0
DEPRESSION: 0

## 2024-02-08 NOTE — PROGRESS NOTES
HPI:  Mr. Masters is a 71 y/o gentleman w/ PMH of prostate cancer (s/p RT and ADT [id number]) but now suspected recurrence considering prostate Bx and radiation, AFib on Eliquis, HTN, and hx of Hep C (s/p Harvoni [2015 - 2016] with negative VL), anxiety, moderate JEFFREY, who presents today for medication refill. He's concerning of his blood pressure which has been running high (-160/DBP 80-90) but asymptomatic. He didn't have any other concern on this visit.     V/S 155/84-63 96%< 170/98-53    At home,  -160  DBP 80-90    Weight 98.7 (BMI 29.51) < 97.6     LAB 12/21/2023  /3.6/103/35 14/1.04  LFT 75/15/17/0.7 7.4/4.2   PSA 5.97  Lipid (07/2023) 155/98/43.5/70    Meds)  Eliquis 5 BID  Metop tart 25 BID  Pravastatin 40  Losartan 100  Amlodipine 10  Tamsulosin 0.8 qhs    Ascorbic 500mg  Cholecalciferol 2000 unit    Upcoming Appts)  4/18/2024 10:00 AM Dr Hassan Oncology    Past Medical History:   Diagnosis Date    A-fib (CMS/HCC)     on Eliquis    Hypercholesteremia     Hypertension     Personal history of irradiation     Personal history of malignant neoplasm of prostate     History of malignant neoplasm of prostate        Review of Systems     Current Outpatient Medications on File Prior to Visit   Medication Sig Dispense Refill    ascorbic acid (Vitamin C) 500 mg tablet Take 1 tablet (500 mg) by mouth once daily.      cholecalciferol (Vitamin D-3) 50 MCG (2000 UT) tablet Take 1 tablet (2,000 Units) by mouth once daily.      Eliquis 5 mg tablet Take 1 tablet (5 mg) by mouth 2 times a day. 60 tablet 2    metoprolol tartrate (Lopressor) 25 mg tablet Take 1 tablet (25 mg) by mouth 2 times a day.      [DISCONTINUED] amLODIPine (Norvasc) 10 mg tablet TAKE 1 TABLET BY MOUTH EVERY DAY 90 tablet 3    [DISCONTINUED] losartan (Cozaar) 100 mg tablet Take 1 tablet (100 mg) by mouth once daily.      [DISCONTINUED] pravastatin (Pravachol) 40 mg tablet Take 1 tablet (40 mg) by mouth once daily at bedtime.       [DISCONTINUED] tamsulosin (Flomax) 0.4 mg 24 hr capsule Take 2 capsules (0.8 mg) by mouth once daily.      [DISCONTINUED] tamsulosin (Flomax) 0.4 mg 24 hr capsule Take 2 capsules (0.8 mg) by mouth once daily. 30 capsule 2     No current facility-administered medications on file prior to visit.        Objective     Last Recorded Vitals  /84 (BP Location: Right arm, Patient Position: Sitting, BP Cuff Size: Large adult)   Pulse 63   Temp 37.1 °C (98.8 °F) (Temporal)   Wt 98.7 kg (217 lb 9.6 oz)   SpO2 96%     Physical Exam   Admission Weight  Weight: 98.7 kg (217 lb 9.6 oz) (02/08/24 1401)    Daily Weight  02/08/24 : 98.7 kg (217 lb 9.6 oz)    Image Results  MR treatment planning prostate pelvis  These images are not reportable by radiology and will not be interpreted   by  Radiologists.      Relevant Results    Lab Results   Component Value Date    WBC 4.2 (L) 02/22/2023    HGB 14.4 02/22/2023    HCT 43.6 02/22/2023    MCV 83 02/22/2023     02/22/2023          Chemistry    Lab Results   Component Value Date/Time     12/21/2023 1104    K 3.6 12/21/2023 1104     12/21/2023 1104    CO2 35 (H) 12/21/2023 1104    BUN 14 12/21/2023 1104    CREATININE 1.04 12/21/2023 1104    Lab Results   Component Value Date/Time    CALCIUM 9.0 12/21/2023 1104    ALKPHOS 75 12/21/2023 1104    AST 17 12/21/2023 1104    ALT 15 12/21/2023 1104    BILITOT 0.7 12/21/2023 1104                         Assessment/Plan      #HTN  #moderate JEFFREY  ::-160, DBP 80-90 for a while  ::on Amlodipine 10/losartan 100  ::last sleep medicine visit was 09/2023  ::has nostril constriction preventing him from using his CPAP machine  -will add HCTZ 12.5 (losartan-HCTZ 100-12.5 prescribed)  -c/w low sodium diet   -will have him in 3 months  -refer to sleep medicine    #Hx of prostate cancer   ::Dx 2014, s/p ADT and RT through 2015  ::PSA 5.97<3.46<2.84 (05/2023)  ::PET PSMA indicates local recurrence  ::Rad-Onc, Onc (Dr Hassan)  following  -planning to get prostate Bx and then radiation if confirmed    #Persistent Afib  ::Follows with Dr. Vazquez, s/p DCCV (6/2022) and ablation (12/2022), instructed to call his cardiology office for further recs  ::TTE (5/2022): LVEF 60-65%; Mild-mod TR, moderate WI  ::CHADsVASC 2 (age/HTN history)  :: NSR on P/Ex today  -C/w Metoprolol tartrate 25mg BID  -C/w Eliquis 5mg BID        #HLD  ::ASCVD risk score 25.6% in 10 years, moderate- to high-intensity statin recommended  -c/w Pravastatin dose 40mg QHS (pt had previously been taking 20mg QHS)     #MGUS  ::follows with Heme-Onc (Dr. Ochoa)  ::SPEP (11/4/22): +M-protein (0.4 g/dL); FLC ratio 2.25  ::Spot UPEP showed 4.6% of total urine protein Kappa FLC   ::Osseous survey negative   :: Negative SLiM CRAB criteria   -no active issue  -will remind him of having follow up after his prostate problem is sorted out                 #HM  - Labs: Last A1c 5.8 (5/2022)  - Lung cancer screening: Reports 40-yr smoking hx (quit 4 yrs ago), low-dose Chest CT ordered  - Colonoscopy: last March 2023 showed 2 polyps (path showed tubular adenoma) repeat in 5-7 years for surveillance   -AAA screening: negative Nov 2015  -Hepatitis B and C s/p Harvoni ([id number]) with negative VL  -HIV negative Oct 2015  -Pneumococcal PCV 12/2019  -TDAP  non on record, denies today   -Shingrex  due today, denies today   -flu, denies today        #Plan  -added HCTZ 12.5 to Losartan 100 (sent the prescription for Losartan-HCTZ 100/12.5)  -refill medication (Pravastatin 40, Amlodipine 10, Tamsulosin 0.8 qhs)  -cardiology apt with Dr Vazquez  -refer to sleep medicine  -low dose chest CT for lung cancer screening    RTC in 3 months            HDL   Date Value Ref Range Status   07/10/2023 43.5 mg/dL Final     Comment:     .      AGE      VERY LOW   LOW     NORMAL    HIGH       0-19 Y       < 35   < 40     40-45     ----    20-24 Y       ----   < 40       >45     ----      >24 Y       ----   < 40      40-60      >60  .       TSH   Date Value Ref Range Status   11/04/2022 0.33 (L) 0.44 - 3.98 mIU/L Final     Comment:      TSH testing is performed using different testing    methodology at [facility] than at other    [facility] hospitals. Direct result comparisons should    only be made within the same method.                    Ari Arnold MD

## 2024-02-08 NOTE — PATIENT INSTRUCTIONS
Hi Mr Masters,     Thank you for visiting us today. It was a pleasure seeing you. Here is a summary of what we discussed:     We'll switch your losartan 100mg to losartan-hydrochlorothiazide 100-12.5 for your blood pressure. We sent the prescription for this along with other meds (pravastatin 40mg, amlodipine 10mg, tamsulosin 0.8mg) to your pharmacy.  I requested the appointments with your cardiologist (Dr Vazquez) and sleep medicine and please schedule these.  Please get the lung CT scan done for lung cancer screening.  Please follow up with your oncologist, and radiation doctor regarding your prostate treatment.    Please return to clinic in 3 months.    Ari Kelley MD, PhD  [facility] [phone]

## 2024-02-09 NOTE — PROGRESS NOTES
I reviewed the resident/fellow's documentation and discussed the patient with the resident/fellow. I agree with the resident/fellow's medical decision making as documented in the note.     Aline Seaman MD MPH

## 2024-02-28 ENCOUNTER — TELEPHONE (OUTPATIENT)
Dept: PRIMARY CARE | Facility: HOSPITAL | Age: 74
End: 2024-02-28
Payer: MEDICARE

## 2024-02-28 ENCOUNTER — TELEPHONE (OUTPATIENT)
Dept: CARDIOLOGY | Facility: HOSPITAL | Age: 74
End: 2024-02-28
Payer: MEDICARE

## 2024-02-28 DIAGNOSIS — I10 HYPERTENSION, UNSPECIFIED TYPE: ICD-10-CM

## 2024-02-28 DIAGNOSIS — I48.91 ATRIAL FIBRILLATION, UNSPECIFIED TYPE (MULTI): ICD-10-CM

## 2024-02-28 RX ORDER — METOPROLOL TARTRATE 25 MG/1
25 TABLET, FILM COATED ORAL 2 TIMES DAILY
Qty: 180 TABLET | Refills: 3 | Status: SHIPPED | OUTPATIENT
Start: 2024-02-28 | End: 2025-02-27

## 2024-02-28 RX ORDER — APIXABAN 5 MG/1
5 TABLET, FILM COATED ORAL 2 TIMES DAILY
Qty: 180 TABLET | Refills: 1 | Status: SHIPPED | OUTPATIENT
Start: 2024-02-28 | End: 2024-02-28 | Stop reason: SDUPTHER

## 2024-02-28 RX ORDER — APIXABAN 5 MG/1
5 TABLET, FILM COATED ORAL 2 TIMES DAILY
Qty: 180 TABLET | Refills: 3 | Status: SHIPPED | OUTPATIENT
Start: 2024-02-28 | End: 2024-05-10 | Stop reason: HOSPADM

## 2024-02-28 RX ORDER — METOPROLOL TARTRATE 25 MG/1
25 TABLET, FILM COATED ORAL 2 TIMES DAILY
Qty: 180 TABLET | Refills: 1 | Status: SHIPPED | OUTPATIENT
Start: 2024-02-28 | End: 2024-02-28 | Stop reason: SDUPTHER

## 2024-02-28 RX ORDER — AMLODIPINE BESYLATE 10 MG/1
10 TABLET ORAL DAILY
Qty: 90 TABLET | Refills: 1 | Status: SHIPPED | OUTPATIENT
Start: 2024-02-28 | End: 2024-08-26

## 2024-02-28 NOTE — TELEPHONE ENCOUNTER
Copied from CRM #[id number]. Topic: Information Request - Prescription Refill FAQ  >> Feb 28, 2024 11:37 AM Elaine LITTLE wrote:  Pt needs a refill on eliqous and metoporol   from Dr Vazquez

## 2024-02-29 ENCOUNTER — PRE-ADMISSION TESTING (OUTPATIENT)
Dept: PREADMISSION TESTING | Facility: HOSPITAL | Age: 74
End: 2024-02-29
Payer: MEDICARE

## 2024-02-29 ENCOUNTER — LAB (OUTPATIENT)
Dept: LAB | Facility: LAB | Age: 74
End: 2024-02-29
Payer: MEDICARE

## 2024-02-29 ENCOUNTER — HOSPITAL ENCOUNTER (OUTPATIENT)
Dept: CARDIOLOGY | Facility: HOSPITAL | Age: 74
Discharge: HOME | End: 2024-02-29
Payer: MEDICARE

## 2024-02-29 VITALS
TEMPERATURE: 96.1 F | RESPIRATION RATE: 18 BRPM | SYSTOLIC BLOOD PRESSURE: 154 MMHG | WEIGHT: 214.73 LBS | BODY MASS INDEX: 30.74 KG/M2 | HEIGHT: 70 IN | OXYGEN SATURATION: 99 % | DIASTOLIC BLOOD PRESSURE: 85 MMHG | HEART RATE: 59 BPM

## 2024-02-29 DIAGNOSIS — I10 HYPERTENSION, UNSPECIFIED TYPE: Primary | ICD-10-CM

## 2024-02-29 DIAGNOSIS — R79.1 ABNORMAL COAGULATION PROFILE: ICD-10-CM

## 2024-02-29 DIAGNOSIS — C61 MALIGNANT NEOPLASM OF PROSTATE (MULTI): ICD-10-CM

## 2024-02-29 DIAGNOSIS — I10 HYPERTENSION, UNSPECIFIED TYPE: ICD-10-CM

## 2024-02-29 LAB
ANION GAP SERPL CALC-SCNC: 11 MMOL/L (ref 10–20)
APPEARANCE UR: CLEAR
APTT PPP: 33 SECONDS (ref 27–38)
BILIRUB UR STRIP.AUTO-MCNC: NEGATIVE MG/DL
BUN SERPL-MCNC: 14 MG/DL (ref 6–23)
CALCIUM SERPL-MCNC: 9 MG/DL (ref 8.6–10.3)
CHLORIDE SERPL-SCNC: 100 MMOL/L (ref 98–107)
CO2 SERPL-SCNC: 33 MMOL/L (ref 21–32)
COLOR UR: NORMAL
CREAT SERPL-MCNC: 1.11 MG/DL (ref 0.5–1.3)
EGFRCR SERPLBLD CKD-EPI 2021: 70 ML/MIN/1.73M*2
ERYTHROCYTE [DISTWIDTH] IN BLOOD BY AUTOMATED COUNT: 14.1 % (ref 11.5–14.5)
GLUCOSE SERPL-MCNC: 107 MG/DL (ref 74–99)
GLUCOSE UR STRIP.AUTO-MCNC: NEGATIVE MG/DL
HCT VFR BLD AUTO: 44.4 % (ref 41–52)
HGB BLD-MCNC: 14.3 G/DL (ref 13.5–17.5)
INR PPP: 1.2 (ref 0.9–1.1)
KETONES UR STRIP.AUTO-MCNC: NEGATIVE MG/DL
LEUKOCYTE ESTERASE UR QL STRIP.AUTO: NEGATIVE
MCH RBC QN AUTO: 26.7 PG (ref 26–34)
MCHC RBC AUTO-ENTMCNC: 32.2 G/DL (ref 32–36)
MCV RBC AUTO: 83 FL (ref 80–100)
NITRITE UR QL STRIP.AUTO: NEGATIVE
NRBC BLD-RTO: 0 /100 WBCS (ref 0–0)
PH UR STRIP.AUTO: 7 [PH]
PLATELET # BLD AUTO: 263 X10*3/UL (ref 150–450)
POTASSIUM SERPL-SCNC: 3.3 MMOL/L (ref 3.5–5.3)
PROT UR STRIP.AUTO-MCNC: NEGATIVE MG/DL
PROTHROMBIN TIME: 13.8 SECONDS (ref 9.8–12.8)
RBC # BLD AUTO: 5.36 X10*6/UL (ref 4.5–5.9)
RBC # UR STRIP.AUTO: NEGATIVE /UL
SODIUM SERPL-SCNC: 141 MMOL/L (ref 136–145)
SP GR UR STRIP.AUTO: 1.01
UROBILINOGEN UR STRIP.AUTO-MCNC: <2 MG/DL
WBC # BLD AUTO: 3.7 X10*3/UL (ref 4.4–11.3)

## 2024-02-29 PROCEDURE — 93005 ELECTROCARDIOGRAM TRACING: CPT

## 2024-02-29 PROCEDURE — 85027 COMPLETE CBC AUTOMATED: CPT

## 2024-02-29 PROCEDURE — 36415 COLL VENOUS BLD VENIPUNCTURE: CPT

## 2024-02-29 PROCEDURE — 85610 PROTHROMBIN TIME: CPT

## 2024-02-29 PROCEDURE — 81003 URINALYSIS AUTO W/O SCOPE: CPT

## 2024-02-29 PROCEDURE — 99203 OFFICE O/P NEW LOW 30 MIN: CPT | Performed by: NURSE PRACTITIONER

## 2024-02-29 PROCEDURE — 80048 BASIC METABOLIC PNL TOTAL CA: CPT

## 2024-02-29 PROCEDURE — 85730 THROMBOPLASTIN TIME PARTIAL: CPT

## 2024-02-29 RX ORDER — DEXTROMETHORPHAN HYDROBROMIDE, GUAIFENESIN 5; 100 MG/5ML; MG/5ML
650 LIQUID ORAL EVERY 8 HOURS PRN
COMMUNITY

## 2024-02-29 ASSESSMENT — ENCOUNTER SYMPTOMS
CARDIOVASCULAR NEGATIVE: 1
RESPIRATORY NEGATIVE: 1
MUSCULOSKELETAL NEGATIVE: 1
NECK NEGATIVE: 1
GASTROINTESTINAL NEGATIVE: 1
NEUROLOGICAL NEGATIVE: 1
CONSTITUTIONAL NEGATIVE: 1

## 2024-02-29 NOTE — H&P (VIEW-ONLY)
"CPM/PAT Evaluation       Name: Christiano Masters (Christiano Masters)  /Age: 1950/73 y.o.       SURGEON :DR ROGERIO BROWN    Surgery, Date, and Length:  Transperineal Biopsy Prostate 3/8/24    HPI:  This a 73y.o. male who presents for presurgical evaluation for  for above mentioned procedure.Pt has a history of  prostate cancer in 2014 s/p RT . His recent PSA was elevated .  . After discussion of the risks and benefits with Dr. BROWN the patient elects to proceed with the planned procedure.       Past Medical History:   Diagnosis Date    A-fib (CMS/HCC)     on Eliquis, pre surgery stop instructions and clearance in 24 \"letters\"    Acute hepatitis C     Genotype 1a, s/p Harvoni [id number]    Anxiety     ASCVD (arteriosclerotic cardiovascular disease)     Benign prostatic hyperplasia with lower urinary tract symptoms     Chronic pain disorder     back    CKD (chronic kidney disease)     CKD (chronic kidney disease)     Cluster headache, chronic     Elevated PSA 2023    5.97    GERD (gastroesophageal reflux disease)     Hepatitis B     + antibody    Hyperaldosteronism (CMS/HCC)     Hypercholesteremia     Hypertension     Mitral valve insufficiency     Monoclonal gammopathy     MGUS followed by Hem/Onc Dr. Ochoa    JEFFREY (obstructive sleep apnea)     Prostate cancer (CMS/HCC)     RT and ADT 5919-2069    Tobacco use disorder        Past Surgical History:   Procedure Laterality Date    CARDIOVERSION  2022    and Ablation for A Fib    COLONOSCOPY  2019    Colonoscopy    HERNIA REPAIR  11/15/2021    Hernia repair    PROSTATE BIOPSY  2014       Anesthesia History  Pt denies any past history of anesthetic complications such as PONV, awareness, prolonged sedation, dental damage, aspiration, cardiac arrest, difficult intubation, difficult I.V. access or unexpected hospital admissions.  NO malignant hyperthermia and or pseudo cholinesterase deficiency.    The patient is not a Religion and will " accept blood and blood products if medically indicated.   No history of blood transfusions .Type and screen not sent.     Social History  Social History     Substance and Sexual Activity   Drug Use Never      Social History     Substance and Sexual Activity   Alcohol Use Never      Social History     Tobacco Use   Smoking Status Never   Smokeless Tobacco Never          Family History   Family history unknown: Yes       Allergies   Allergen Reactions    Lisinopril Cough    Other Unknown       Prior to Admission medications    Medication Sig Start Date End Date Taking? Authorizing Provider   ALPRAZolam (Xanax) 0.25 mg tablet Xanax 0.25 mg oral tablet ; 1 tab(s) orally 2 times a day and PRN Quantity: 30 Refills: 0 Ordered: 25-Jul-2014 Syd Hermosillolong Start: 25-Jul-2014 Status: Other Generic Substitution Allowed Comments: Avoid grapefruit and grapefruit juice while taking this medication.Caution federal law prohibits the transfer of this drug to any person other than the person for whom it was prescribed.Do not take this drug if you are pregnant.May cause drowsiness. Alcohol may intensify this effect. Use care when operating dangerous machinery. 7/25/14   Historical Provider, MD   amLODIPine (Norvasc) 10 mg tablet Take 1 tablet (10 mg) by mouth once daily. 2/28/24 8/26/24  Iain Castanon MD   ascorbic acid (VITAMIN C ORAL) Vitamin C TABS 1 TABLET AS NEEDED FOR COLDS Quantity: 0 Refills: 0 Ordered: 18-Apr-2023 DO Active    Historical Provider, MD   ascorbic acid (Vitamin C) 500 mg tablet Take 1 tablet (500 mg) by mouth once daily. 2/8/16   Historical Provider, MD   cholecalciferol (Vitamin D-3) 50 MCG (2000 UT) tablet Take 1 tablet (2,000 Units) by mouth once daily. 1/15/21   Historical Provider, MD   Eliquis 5 mg tablet Take 1 tablet (5 mg) by mouth 2 times a day. 2/28/24 2/27/25  Mitch Vazquez MD   losartan-hydrochlorothiazide (Hyzaar) 100-12.5 mg tablet Take 1 tablet by mouth once daily. 2/8/24 2/7/25  Ari DENISE  MD [place]   metoprolol tartrate (Lopressor) 25 mg tablet Take 1 tablet (25 mg) by mouth 2 times a day. 2/28/24 2/27/25  Micth Vazquez MD   pravastatin (Pravachol) 40 mg tablet Take 1 tablet (40 mg) by mouth once daily at bedtime. 2/8/24 2/2/25  Ari Arnold MD   tamsulosin (Flomax) 0.4 mg 24 hr capsule Take 2 capsules (0.8 mg) by mouth once daily. 2/8/24 11/4/24  Ari Arnold MD   amLODIPine (Norvasc) 10 mg tablet Take 1 tablet (10 mg) by mouth once daily. 2/8/24 2/28/24  Ari Arnold MD   Eliquis 5 mg tablet Take 1 tablet (5 mg) by mouth 2 times a day. 12/4/23 2/28/24  Demar Calle MD   Eliquis 5 mg tablet Take 1 tablet (5 mg) by mouth 2 times a day. 2/28/24 2/28/24  Iain Castanon MD   metoprolol tartrate (Lopressor) 25 mg tablet Take 1 tablet (25 mg) by mouth 2 times a day.  2/28/24  Oscar Sarkar MD   metoprolol tartrate (Lopressor) 25 mg tablet Take 1 tablet (25 mg) by mouth 2 times a day. 2/28/24 2/28/24  Iain Castanon MD        PAT ROS:   Constitutional:   neg    Neuro/Psych:   neg    Eyes:   Ears:   Nose:   Mouth:   Throat:   neg    Neck:   neg    Cardio:   neg    Respiratory:   neg    Endocrine:   GI:   neg    :   neg    Musculoskeletal:   neg    Hematologic:   neg    Skin:  neg        Physical Exam  Vitals reviewed.   Constitutional:       Appearance: Normal appearance.   HENT:      Head: Normocephalic.   Eyes:      Extraocular Movements: Extraocular movements intact.      Pupils: Pupils are equal, round, and reactive to light.   Cardiovascular:      Rate and Rhythm: Normal rate and regular rhythm.      Pulses: Normal pulses.      Heart sounds: Normal heart sounds.   Pulmonary:      Effort: Pulmonary effort is normal.      Breath sounds: Normal breath sounds.   Musculoskeletal:         General: Normal range of motion.      Cervical back: Normal range of motion.   Skin:     General: Skin is warm.   Neurological:      Mental Status: He is alert and oriented to person, place,  and time.   Psychiatric:         Mood and Affect: Mood normal.         Behavior: Behavior normal.          PAT AIRWAY:   Airway:     Mallampati::  III   Upper plate    partials and upper dentures    EKG SB, LVH     Lab Results   Component Value Date    WBC 4.2 (L) 02/22/2023    HGB 14.4 02/22/2023    HCT 43.6 02/22/2023    MCV 83 02/22/2023     02/22/2023     Results from last 7 days   Lab Units 02/29/24  1039   SODIUM mmol/L 141   POTASSIUM mmol/L 3.3*   CHLORIDE mmol/L 100   CO2 mmol/L 33*   BUN mg/dL 14   CREATININE mg/dL 1.11   CALCIUM mg/dL 9.0   GLUCOSE mg/dL 107*     PT/INR : 13.8/1.2  (Pt is on Eliquis )    UA: NEGATIVE    ASSESSMENT/PLAN    Patient is a73 year-old  scheduled for Transperineal Biopsy Prostate  with Dr. novak  on  3/8/24 .  CARDIOVASCULAR:  RCRI score / Risk: The patients score is 1 based on history . Per ACC/AHA guidelines this places his  at  6.0 % risk for MACE undergoing a intermediate  risk procedure . The patient has the following risk factors: CAD  Functional Capacity: The patients exercise tolerance is  4  METS. This is based on the patients. Patient denies  active cardiac symptoms or anginal equivalents .    AFIB/ANTICOAGULANT THERAPY :  Patient was instructed to stop Eliquis  , 3 days before surgery   Stop instructions received dr Brush  Resume anticoagulation therapy as soon as appropriate.    We have received cardiac risk stratification from the patient's cardiologist on 2/29/.Dr. brush has assessed him as a moderate  risk and advised anticoagulation instructions .    PULMONARY:  The patient has the following factors that place them at increased risk of perioperative pulmonary complications;age greater than 65/BMI greater than 27/JEFFREY.  Postoperatively the patient would benefit from early pulmonary toilet/incentive spirometry q 1-2 hours while awake/pulse oximetry/cautious use of respiratory depressant medications such as opioids/elevate the HOB/oral  hygiene.    CKD:  The patient has had chronic kidney disease  is currently at stage 2,with creatinine levels that have been stable.  Recommendations: Avoid intraoperative hypotension. Avoid nephrotoxic drugs and radicontrast dyes.  Recheck BMP periodically in the post op period.    BPH:  Pt is on (alpha 5- reductase inhibitors) Recommendations: continue throughout perioperative period, monitior for urinary retention, avoid patel catheter if able..    HEP C /Hep B antibody positive:  Pt has history hep C .Treated with Harvoni 2015-16  LFT's WNL.        DVT:  CAPRINI SCORE=6  The patient has the following factors that increase his  Risk for thrombus formation ; Virchow's triad , age>70, bmi> 25,, Surgical procedure >2 hrs  procedure .    Recommendations: DVT prophylaxis  per Dr. BROWN protocol . SCD's, COSMO's, and early ambulation are recommended. Heparin or LMWH is recommended for the very high risk .      Risk assessment complete.  Patient is scheduled for  LOW  surgical risk procedure.  Patient is considered an increased not prohibitive  risk to proceed with the planned procedure.      Preoperative medication instructions were provided and reviewed with the patient.  Any additional testing or evaluation was explained to the patient.  Nothing by mouth instructions were discussed and patient's questions were answered prior to conclusion to this encounter.  Patient verbalized understanding of preoperative instructions given in preadmission testing; discharge instructions available in EMR.

## 2024-02-29 NOTE — PREPROCEDURE INSTRUCTIONS
Medication List            Accurate as of February 29, 2024 10:00 AM. Always use your most recent med list.                acetaminophen 650 mg ER tablet  Commonly known as: Tylenol 8 HOUR  Notes to patient: Take if needed      ALPRAZolam 0.25 mg tablet  Commonly known as: Xanax  Notes to patient: Take if needed      amLODIPine 10 mg tablet  Commonly known as: Norvasc  Take 1 tablet (10 mg) by mouth once daily.  Medication Adjustments for Surgery: Take morning of surgery with sip of water, no other fluids     cholecalciferol 50 MCG (2000 UT) tablet  Commonly known as: Vitamin D-3  Medication Adjustments for Surgery: Continue until night before surgery     Eliquis 5 mg tablet  Generic drug: apixaban  Take 1 tablet (5 mg) by mouth 2 times a day.  Medication Adjustments for Surgery: Stop 3 days before surgery     losartan-hydrochlorothiazide 100-12.5 mg tablet  Commonly known as: Hyzaar  Take 1 tablet by mouth once daily.  Medication Adjustments for Surgery: Continue until night before surgery     metoprolol tartrate 25 mg tablet  Commonly known as: Lopressor  Take 1 tablet (25 mg) by mouth 2 times a day.  Medication Adjustments for Surgery: Take morning of surgery with sip of water, no other fluids     pravastatin 40 mg tablet  Commonly known as: Pravachol  Take 1 tablet (40 mg) by mouth once daily at bedtime.  Medication Adjustments for Surgery: Take morning of surgery with sip of water, no other fluids     tamsulosin 0.4 mg 24 hr capsule  Commonly known as: Flomax  Take 2 capsules (0.8 mg) by mouth once daily.  Medication Adjustments for Surgery: Take morning of surgery with sip of water, no other fluids     VITAMIN C ORAL  Medication Adjustments for Surgery: Stop 7 days before surgery                 CONTACT SURGEON'S OFFICE IF YOU DEVELOP:  * Fever = 100.4 F   * New respiratory symptoms (e.g. cough, shortness of breath, respiratory distress, sore throat)  * Recent loss of taste or smell  *Flu like symptoms such  as headache, fatigue or gastrointestinal symptoms  * You develop any open sores, shingles, burning or painful urination   AND/OR:  * You no longer wish to have the surgery.  * Any other personal circumstances change that may lead to the need to cancel or defer this surgery.  *You were admitted to any hospital within one week of your planned procedure.    SMOKING:  *Quitting smoking can make a huge difference to your health and recovery from surgery.    *If you need help with quitting, call 1-611-QUIT-NOW.    THE DAY BEFORE SURGERY:  *Do not eat any food after midnight the night before surgery.   *You are permitted to drink clear liquids (i.e. water, black coffee, tea, clear broth, apple juice) up to 2 hours before your surgery.  DIABETICS:  Please check fasting blood sugar  upon waking up.  If fasting sugar is <80 mg/dl, please drink 100ml/3oz of apple juice no later than 2 hours prior to surgery.      SURGICAL TIME  *You will be contacted between 2 p.m. and 6 p.m. the business day before your surgery with your arrival time.  *If you haven't received a call by 6pm, call [phone].  *Scheduled surgery times may change and you will be notified if this occurs-check your personal voicemail for any updates.    ON THE MORNING OF SURGERY:  *Wear comfortable, loose fitting clothing.   *Do not use moisturizers, creams, lotions or perfume.  *All jewelry and valuables should be left at home.  *Prosthetic devices such as contact lenses, hearing aids, dentures, eyelash extensions, hairpins and body piercing must be removed before surgery.    BRING WITH YOU:  *Photo ID and insurance card  *Current list of medicines and allergies  *Pacemaker/Defibrillator/Heart stent cards  *CPAP machine and mask  *Slings/splints/crutches  *Copy of your complete Advanced Directive/DHPOA-if applicable  *Neurostimulator implant remote    PARKING AND ARRIVAL:  *Check in at the Main Entrance desk and let them know you are here for surgery.  *You  will be directed to the 2nd floor surgical waiting area.    AFTER OUTPATIENT SURGERY:  *A responsible adult MUST accompany you at the time of discharge and stay with you for 24 hours after your surgery.  *You may NOT drive yourself home after surgery.  *You may use a taxi or ride sharing service ([employer], [employer]) to return home ONLY if you are accompanied by a friend or family member.  *Instructions for resuming your medications will be provided by your surgeon.      YOU HAVE REVIEWED THE MEDICATIONS ON THIS SHEET AND YOU VERIFY THESE ARE ALL THE MEDICATIONS AND OVER THE COUNTER MEDICATIONS THAT YOU TAKE .

## 2024-02-29 NOTE — CPM/PAT H&P
"CPM/PAT Evaluation       Name: Christiano Masters (Christiano Masters)  /Age: 1950/73 y.o.       SURGEON :DR ROGERIO BROWN    Surgery, Date, and Length:  Transperineal Biopsy Prostate 3/8/24    HPI:  This a 73y.o. male who presents for presurgical evaluation for  for above mentioned procedure.Pt has a history of  prostate cancer in 2014 s/p RT . His recent PSA was elevated .  . After discussion of the risks and benefits with Dr. BROWN the patient elects to proceed with the planned procedure.       Past Medical History:   Diagnosis Date    A-fib (CMS/HCC)     on Eliquis, pre surgery stop instructions and clearance in 24 \"letters\"    Acute hepatitis C     Genotype 1a, s/p Harvoni [id number]    Anxiety     ASCVD (arteriosclerotic cardiovascular disease)     Benign prostatic hyperplasia with lower urinary tract symptoms     Chronic pain disorder     back    CKD (chronic kidney disease)     CKD (chronic kidney disease)     Cluster headache, chronic     Elevated PSA 2023    5.97    GERD (gastroesophageal reflux disease)     Hepatitis B     + antibody    Hyperaldosteronism (CMS/HCC)     Hypercholesteremia     Hypertension     Mitral valve insufficiency     Monoclonal gammopathy     MGUS followed by Hem/Onc Dr. Ochoa    JEFFREY (obstructive sleep apnea)     Prostate cancer (CMS/HCC)     RT and ADT [id number]    Tobacco use disorder        Past Surgical History:   Procedure Laterality Date    CARDIOVERSION  2022    and Ablation for A Fib    COLONOSCOPY  2019    Colonoscopy    HERNIA REPAIR  11/15/2021    Hernia repair    PROSTATE BIOPSY  2014       Anesthesia History  Pt denies any past history of anesthetic complications such as PONV, awareness, prolonged sedation, dental damage, aspiration, cardiac arrest, difficult intubation, difficult I.V. access or unexpected hospital admissions.  NO malignant hyperthermia and or pseudo cholinesterase deficiency.    The patient is not a Jehovah's witness and will " accept blood and blood products if medically indicated.   No history of blood transfusions .Type and screen not sent.     Social History  Social History     Substance and Sexual Activity   Drug Use Never      Social History     Substance and Sexual Activity   Alcohol Use Never      Social History     Tobacco Use   Smoking Status Never   Smokeless Tobacco Never          Family History   Family history unknown: Yes       Allergies   Allergen Reactions    Lisinopril Cough    Other Unknown       Prior to Admission medications    Medication Sig Start Date End Date Taking? Authorizing Provider   ALPRAZolam (Xanax) 0.25 mg tablet Xanax 0.25 mg oral tablet ; 1 tab(s) orally 2 times a day and PRN Quantity: 30 Refills: 0 Ordered: 25-Jul-2014 Syd Hermosillolong Start: 25-Jul-2014 Status: Other Generic Substitution Allowed Comments: Avoid grapefruit and grapefruit juice while taking this medication.Caution federal law prohibits the transfer of this drug to any person other than the person for whom it was prescribed.Do not take this drug if you are pregnant.May cause drowsiness. Alcohol may intensify this effect. Use care when operating dangerous machinery. 7/25/14   Historical Provider, MD   amLODIPine (Norvasc) 10 mg tablet Take 1 tablet (10 mg) by mouth once daily. 2/28/24 8/26/24  Iain Castanon MD   ascorbic acid (VITAMIN C ORAL) Vitamin C TABS 1 TABLET AS NEEDED FOR COLDS Quantity: 0 Refills: 0 Ordered: 18-Apr-2023 DO Active    Historical Provider, MD   ascorbic acid (Vitamin C) 500 mg tablet Take 1 tablet (500 mg) by mouth once daily. 2/8/16   Historical Provider, MD   cholecalciferol (Vitamin D-3) 50 MCG (2000 UT) tablet Take 1 tablet (2,000 Units) by mouth once daily. 1/15/21   Historical Provider, MD   Eliquis 5 mg tablet Take 1 tablet (5 mg) by mouth 2 times a day. 2/28/24 2/27/25  Mitch Vazquez MD   losartan-hydrochlorothiazide (Hyzaar) 100-12.5 mg tablet Take 1 tablet by mouth once daily. 2/8/24 2/7/25  Ari DENISE  MD [place]   metoprolol tartrate (Lopressor) 25 mg tablet Take 1 tablet (25 mg) by mouth 2 times a day. 2/28/24 2/27/25  Mitch Vazquez MD   pravastatin (Pravachol) 40 mg tablet Take 1 tablet (40 mg) by mouth once daily at bedtime. 2/8/24 2/2/25  Ari Arnold MD   tamsulosin (Flomax) 0.4 mg 24 hr capsule Take 2 capsules (0.8 mg) by mouth once daily. 2/8/24 11/4/24  Ari Arnold MD   amLODIPine (Norvasc) 10 mg tablet Take 1 tablet (10 mg) by mouth once daily. 2/8/24 2/28/24  Ari Arnold MD   Eliquis 5 mg tablet Take 1 tablet (5 mg) by mouth 2 times a day. 12/4/23 2/28/24  Demar Calle MD   Eliquis 5 mg tablet Take 1 tablet (5 mg) by mouth 2 times a day. 2/28/24 2/28/24  Iain Castanon MD   metoprolol tartrate (Lopressor) 25 mg tablet Take 1 tablet (25 mg) by mouth 2 times a day.  2/28/24  Oscar Sarkar MD   metoprolol tartrate (Lopressor) 25 mg tablet Take 1 tablet (25 mg) by mouth 2 times a day. 2/28/24 2/28/24  Iain Castanon MD        PAT ROS:   Constitutional:   neg    Neuro/Psych:   neg    Eyes:   Ears:   Nose:   Mouth:   Throat:   neg    Neck:   neg    Cardio:   neg    Respiratory:   neg    Endocrine:   GI:   neg    :   neg    Musculoskeletal:   neg    Hematologic:   neg    Skin:  neg        Physical Exam  Vitals reviewed.   Constitutional:       Appearance: Normal appearance.   HENT:      Head: Normocephalic.   Eyes:      Extraocular Movements: Extraocular movements intact.      Pupils: Pupils are equal, round, and reactive to light.   Cardiovascular:      Rate and Rhythm: Normal rate and regular rhythm.      Pulses: Normal pulses.      Heart sounds: Normal heart sounds.   Pulmonary:      Effort: Pulmonary effort is normal.      Breath sounds: Normal breath sounds.   Musculoskeletal:         General: Normal range of motion.      Cervical back: Normal range of motion.   Skin:     General: Skin is warm.   Neurological:      Mental Status: He is alert and oriented to person, place,  and time.   Psychiatric:         Mood and Affect: Mood normal.         Behavior: Behavior normal.          PAT AIRWAY:   Airway:     Mallampati::  III   Upper plate    partials and upper dentures    EKG SB, LVH     Lab Results   Component Value Date    WBC 4.2 (L) 02/22/2023    HGB 14.4 02/22/2023    HCT 43.6 02/22/2023    MCV 83 02/22/2023     02/22/2023     Results from last 7 days   Lab Units 02/29/24  1039   SODIUM mmol/L 141   POTASSIUM mmol/L 3.3*   CHLORIDE mmol/L 100   CO2 mmol/L 33*   BUN mg/dL 14   CREATININE mg/dL 1.11   CALCIUM mg/dL 9.0   GLUCOSE mg/dL 107*     PT/INR : 13.8/1.2  (Pt is on Eliquis )    UA: NEGATIVE    ASSESSMENT/PLAN    Patient is a73 year-old  scheduled for Transperineal Biopsy Prostate  with Dr. novak  on  3/8/24 .  CARDIOVASCULAR:  RCRI score / Risk: The patients score is 1 based on history . Per ACC/AHA guidelines this places his  at  6.0 % risk for MACE undergoing a intermediate  risk procedure . The patient has the following risk factors: CAD  Functional Capacity: The patients exercise tolerance is  4  METS. This is based on the patients. Patient denies  active cardiac symptoms or anginal equivalents .    AFIB/ANTICOAGULANT THERAPY :  Patient was instructed to stop Eliquis  , 3 days before surgery   Stop instructions received dr Brush  Resume anticoagulation therapy as soon as appropriate.    We have received cardiac risk stratification from the patient's cardiologist on 2/29/.Dr. brush has assessed him as a moderate  risk and advised anticoagulation instructions .    PULMONARY:  The patient has the following factors that place them at increased risk of perioperative pulmonary complications;age greater than 65/BMI greater than 27/JEFFREY.  Postoperatively the patient would benefit from early pulmonary toilet/incentive spirometry q 1-2 hours while awake/pulse oximetry/cautious use of respiratory depressant medications such as opioids/elevate the HOB/oral  hygiene.    CKD:  The patient has had chronic kidney disease  is currently at stage 2,with creatinine levels that have been stable.  Recommendations: Avoid intraoperative hypotension. Avoid nephrotoxic drugs and radicontrast dyes.  Recheck BMP periodically in the post op period.    BPH:  Pt is on (alpha 5- reductase inhibitors) Recommendations: continue throughout perioperative period, monitior for urinary retention, avoid patel catheter if able..    HEP C /Hep B antibody positive:  Pt has history hep C .Treated with Harvoni 2015-16  LFT's WNL.        DVT:  CAPRINI SCORE=6  The patient has the following factors that increase his  Risk for thrombus formation ; Virchow's triad , age>70, bmi> 25,, Surgical procedure >2 hrs  procedure .    Recommendations: DVT prophylaxis  per Dr. BROWN protocol . SCD's, COSMO's, and early ambulation are recommended. Heparin or LMWH is recommended for the very high risk .      Risk assessment complete.  Patient is scheduled for  LOW  surgical risk procedure.  Patient is considered an increased not prohibitive  risk to proceed with the planned procedure.      Preoperative medication instructions were provided and reviewed with the patient.  Any additional testing or evaluation was explained to the patient.  Nothing by mouth instructions were discussed and patient's questions were answered prior to conclusion to this encounter.  Patient verbalized understanding of preoperative instructions given in preadmission testing; discharge instructions available in EMR.

## 2024-03-01 LAB
ATRIAL RATE: 54 BPM
P AXIS: 51 DEGREES
P OFFSET: 187 MS
P ONSET: 130 MS
PR INTERVAL: 178 MS
Q ONSET: 219 MS
QRS COUNT: 9 BEATS
QRS DURATION: 120 MS
QT INTERVAL: 422 MS
QTC CALCULATION(BAZETT): 400 MS
QTC FREDERICIA: 407 MS
R AXIS: -23 DEGREES
T AXIS: 19 DEGREES
T OFFSET: 430 MS
VENTRICULAR RATE: 54 BPM

## 2024-03-01 PROCEDURE — 93005 ELECTROCARDIOGRAM TRACING: CPT

## 2024-03-06 NOTE — PROGRESS NOTES
I had the pleasure seeing Christiano Masters     Chief Complaint   Patient presents with    Hypertension    Atrial Fibrillation     He presents today for his annual visit.    [id number] VASc:  3  (age, htn, vasc dz)        Current Outpatient Medications   Medication Instructions    acetaminophen (TYLENOL 8 HOUR) 650 mg, oral, Every 8 hours PRN, Do not crush, chew, or split.    amLODIPine (NORVASC) 10 mg, oral, Daily    ascorbic acid (VITAMIN C ORAL) Vitamin C TABS 1 TABLET AS NEEDED FOR COLDS Quantity: 0 Refills: 0 Ordered: 18-Apr-2023 DO Active    cholecalciferol (Vitamin D-3) 50 MCG (2000 UT) tablet 1 tablet, oral, Daily    Eliquis 5 mg, oral, 2 times daily    losartan-hydrochlorothiazide (Hyzaar) 100-12.5 mg tablet 1 tablet, oral, Daily    metoprolol tartrate (LOPRESSOR) 25 mg, oral, 2 times daily    pravastatin (PRAVACHOL) 40 mg, oral, Nightly    tamsulosin (FLOMAX) 0.8 mg, oral, Daily        Christiano Masters is a 73 y.o. with:     Hepatitis B and C - s/p Harvoni ([id number]) with neg VL   CAD  HTN, JEFFREY, and CKD    Prostate Cancer - s/p RT and ADT  Organ-limited Amyloidosis   Persistent AF - Newly found on EKG at PCP office (May 19, 2022). He was seen for diarrhea and discoloration in his urine. This was after a trip in [place]. Started on Metoprolol for rate control. No OAT.     AF treatment hx:  (index dx 5/19/22) started on Metoprolol.  S/p DCCV (June 2022).  Back in AF (Nov 2022).   Successful PVI by me (12/19/22) without complications. Noted to be in AF during 6week f/u.  Underwent successful DCCV (2/27/23).    May 2022: We initiated OAT and will proceed with cardioversion. Should he feel better in NSR and reverts into atrial fibrillation our next step would be to proceed with either an antiarrhythmic or PVI.     June 20, 2022: successful DCCV done by me     Nov 3, 2022: He felt much better after the cardioversion. But recently started noticing lightheadedness especially when standing up. He also has noted his  blood pressure is becoming lower. He is back in atrial fibrillation.   Proceed with PVI.        Dec 19, 2022:  Underwent PVI by me without complications. Noted to be in AF during 6 wk follow up.  Underwent successful DCCV (2/27/23).    ECG 3/21/23 SB 51 bpm with normal intervals.  ECG 2/7/23 AFib     Today, he is here to go over his medications and states he has been having some groin pain.      TESTING    -ECHO/ TTE:  (5/27/22) LVEF 60-65%.  Mild-mod TR and mod Pulmonic valve regurgitation.  Pt in AF during exam.    -ECHO/ TTE: (JULY 2015) LVEF normal. Mod TR and Pulmonic valve regurgitation.       -Cardiac MRI: (12/5/22) no MRI findings suggestive of Amyloidosis, EF 48%       Objective   Physical Exam  /84 (BP Location: Left arm, Patient Position: Sitting)   Pulse 63   Ht 1.829 m (6')   Wt 98.5 kg (217 lb 1 oz)   SpO2 100%   BMI 29.44 kg/m²     General Appearance:  Alert, cooperative, no distress, appears stated age   Head:  Normocephalic, without obvious abnormality, atraumatic   Eyes:  PERRL, conjunctiva/corneas clear, EOM's intact, fundi benign, both eyes   Ears:  Normal TM's and external ear canals, both ears   Nose: Nares normal, septum midline, mucosa normal, no drainage or sinus tenderness   Throat: Lips, mucosa, and tongue normal; teeth and gums normal   Neck: Supple, symmetrical, trachea midline, no adenopathy, thyroid: not enlarged, symmetric, no tenderness/mass/nodules, no carotid bruit or JVD   Back:   Symmetric, no curvature, ROM normal, no CVA tenderness   Lungs:   Clear to auscultation bilaterally, respirations unlabored   Chest Wall:  No tenderness or deformity   Heart:  Regular rate and rhythm, S1, S2 normal, no murmur, rub or gallop   Abdomen:   Soft, non-tender, bowel sounds active all four quadrants,  no masses, no organomegaly   Genitalia:  Normal male   Rectal:  Normal tone, normal prostate, no masses or tenderness;  guaiac negative stool   Extremities: Extremities normal,  atraumatic, no cyanosis or edema   Pulses: 2+ and symmetric   Skin: Skin color, texture, turgor normal, no rashes or lesions   Lymph nodes: Cervical, supraclavicular, and axillary nodes normal   Neurologic: Normal           Assessment/Plan   Hypertension  He is on amlodipine 10 mg once a day. Losartan/hydrochlorothiazide was just added as his blood pressure was elevated. We will continue to follow.     Persistent atrial fibrillation (CMS/HCC)  He had symptomatic persistent atrial fibrillation. S/p PVI in December of 2022 and has not had a recurrence! He however wants to stop the Eliquis. We discussed the options, his CHADS VASC is 2. He prefers to stop the Eliquis with frequent ECG monitoring as he has had no recurrence of atrial fibrillation. I did explain to him the risks of stopping the OAT. We will follow with him.

## 2024-03-07 ENCOUNTER — OFFICE VISIT (OUTPATIENT)
Dept: CARDIOLOGY | Facility: CLINIC | Age: 74
End: 2024-03-07
Payer: MEDICARE

## 2024-03-07 VITALS
HEART RATE: 63 BPM | BODY MASS INDEX: 29.4 KG/M2 | SYSTOLIC BLOOD PRESSURE: 157 MMHG | HEIGHT: 72 IN | WEIGHT: 217.06 LBS | DIASTOLIC BLOOD PRESSURE: 84 MMHG | OXYGEN SATURATION: 100 %

## 2024-03-07 DIAGNOSIS — I48.0 PAROXYSMAL ATRIAL FIBRILLATION (MULTI): ICD-10-CM

## 2024-03-07 PROCEDURE — 1126F AMNT PAIN NOTED NONE PRSNT: CPT | Performed by: INTERNAL MEDICINE

## 2024-03-07 PROCEDURE — 99214 OFFICE O/P EST MOD 30 MIN: CPT | Performed by: INTERNAL MEDICINE

## 2024-03-07 PROCEDURE — 3077F SYST BP >= 140 MM HG: CPT | Performed by: INTERNAL MEDICINE

## 2024-03-07 PROCEDURE — 3079F DIAST BP 80-89 MM HG: CPT | Performed by: INTERNAL MEDICINE

## 2024-03-07 PROCEDURE — 93010 ELECTROCARDIOGRAM REPORT: CPT | Performed by: INTERNAL MEDICINE

## 2024-03-07 PROCEDURE — 99214 OFFICE O/P EST MOD 30 MIN: CPT | Mod: 25 | Performed by: INTERNAL MEDICINE

## 2024-03-07 PROCEDURE — 1036F TOBACCO NON-USER: CPT | Performed by: INTERNAL MEDICINE

## 2024-03-07 PROCEDURE — 93005 ELECTROCARDIOGRAM TRACING: CPT | Performed by: INTERNAL MEDICINE

## 2024-03-07 PROCEDURE — 1159F MED LIST DOCD IN RCRD: CPT | Performed by: INTERNAL MEDICINE

## 2024-03-07 ASSESSMENT — PATIENT HEALTH QUESTIONNAIRE - PHQ9
2. FEELING DOWN, DEPRESSED OR HOPELESS: NOT AT ALL
1. LITTLE INTEREST OR PLEASURE IN DOING THINGS: NOT AT ALL
SUM OF ALL RESPONSES TO PHQ9 QUESTIONS 1 AND 2: 0

## 2024-03-07 ASSESSMENT — PAIN SCALES - GENERAL: PAINLEVEL: 0-NO PAIN

## 2024-03-07 ASSESSMENT — COLUMBIA-SUICIDE SEVERITY RATING SCALE - C-SSRS
2. HAVE YOU ACTUALLY HAD ANY THOUGHTS OF KILLING YOURSELF?: NO
1. IN THE PAST MONTH, HAVE YOU WISHED YOU WERE DEAD OR WISHED YOU COULD GO TO SLEEP AND NOT WAKE UP?: NO
6. HAVE YOU EVER DONE ANYTHING, STARTED TO DO ANYTHING, OR PREPARED TO DO ANYTHING TO END YOUR LIFE?: NO

## 2024-03-07 NOTE — ASSESSMENT & PLAN NOTE
He is on amlodipine 10 mg once a day. Losartan/hydrochlorothiazide was just added as his blood pressure was elevated. We will continue to follow.

## 2024-03-07 NOTE — ASSESSMENT & PLAN NOTE
He had symptomatic persistent atrial fibrillation. S/p PVI in December of 2022 and has not had a recurrence! He however wants to stop the Eliquis. We discussed the options, his CHADS VASC is 2. He prefers to stop the Eliquis with frequent ECG monitoring as he has had no recurrence of atrial fibrillation. I did explain to him the risks of stopping the OAT. We will follow with him.

## 2024-03-08 ENCOUNTER — ANESTHESIA (OUTPATIENT)
Dept: OPERATING ROOM | Facility: HOSPITAL | Age: 74
End: 2024-03-08
Payer: MEDICARE

## 2024-03-08 ENCOUNTER — HOSPITAL ENCOUNTER (OUTPATIENT)
Facility: HOSPITAL | Age: 74
Setting detail: OUTPATIENT SURGERY
Discharge: HOME | End: 2024-03-08
Attending: STUDENT IN AN ORGANIZED HEALTH CARE EDUCATION/TRAINING PROGRAM | Admitting: STUDENT IN AN ORGANIZED HEALTH CARE EDUCATION/TRAINING PROGRAM
Payer: MEDICARE

## 2024-03-08 ENCOUNTER — ANESTHESIA EVENT (OUTPATIENT)
Dept: OPERATING ROOM | Facility: HOSPITAL | Age: 74
End: 2024-03-08
Payer: MEDICARE

## 2024-03-08 VITALS
DIASTOLIC BLOOD PRESSURE: 82 MMHG | BODY MASS INDEX: 29.71 KG/M2 | HEIGHT: 72 IN | SYSTOLIC BLOOD PRESSURE: 129 MMHG | HEART RATE: 59 BPM | WEIGHT: 219.36 LBS | OXYGEN SATURATION: 98 % | TEMPERATURE: 96.8 F | RESPIRATION RATE: 17 BRPM

## 2024-03-08 DIAGNOSIS — C61 MALIGNANT NEOPLASM OF PROSTATE (MULTI): ICD-10-CM

## 2024-03-08 LAB
ATRIAL RATE: 52 BPM
P AXIS: 46 DEGREES
P OFFSET: 177 MS
P ONSET: 118 MS
PR INTERVAL: 172 MS
Q ONSET: 204 MS
QRS COUNT: 9 BEATS
QRS DURATION: 122 MS
QT INTERVAL: 416 MS
QTC CALCULATION(BAZETT): 386 MS
QTC FREDERICIA: 396 MS
R AXIS: -20 DEGREES
T AXIS: 14 DEGREES
T OFFSET: 412 MS
VENTRICULAR RATE: 52 BPM

## 2024-03-08 PROCEDURE — 2500000004 HC RX 250 GENERAL PHARMACY W/ HCPCS (ALT 636 FOR OP/ED): Performed by: STUDENT IN AN ORGANIZED HEALTH CARE EDUCATION/TRAINING PROGRAM

## 2024-03-08 PROCEDURE — 7100000009 HC PHASE TWO TIME - INITIAL BASE CHARGE: Performed by: STUDENT IN AN ORGANIZED HEALTH CARE EDUCATION/TRAINING PROGRAM

## 2024-03-08 PROCEDURE — C1819 TISSUE LOCALIZATION-EXCISION: HCPCS | Performed by: STUDENT IN AN ORGANIZED HEALTH CARE EDUCATION/TRAINING PROGRAM

## 2024-03-08 PROCEDURE — 7100000001 HC RECOVERY ROOM TIME - INITIAL BASE CHARGE: Performed by: STUDENT IN AN ORGANIZED HEALTH CARE EDUCATION/TRAINING PROGRAM

## 2024-03-08 PROCEDURE — A55700 PR BIOPSY OF PROSTATE,NEEDLE/PUNCH: Performed by: NURSE ANESTHETIST, CERTIFIED REGISTERED

## 2024-03-08 PROCEDURE — 7100000002 HC RECOVERY ROOM TIME - EACH INCREMENTAL 1 MINUTE: Performed by: STUDENT IN AN ORGANIZED HEALTH CARE EDUCATION/TRAINING PROGRAM

## 2024-03-08 PROCEDURE — 2720000007 HC OR 272 NO HCPCS: Performed by: STUDENT IN AN ORGANIZED HEALTH CARE EDUCATION/TRAINING PROGRAM

## 2024-03-08 PROCEDURE — 88344 IMHCHEM/IMCYTCHM EA MLT ANTB: CPT | Performed by: PATHOLOGY

## 2024-03-08 PROCEDURE — 2500000005 HC RX 250 GENERAL PHARMACY W/O HCPCS: Performed by: NURSE ANESTHETIST, CERTIFIED REGISTERED

## 2024-03-08 PROCEDURE — 88344 IMHCHEM/IMCYTCHM EA MLT ANTB: CPT | Mod: TC,MUE | Performed by: STUDENT IN AN ORGANIZED HEALTH CARE EDUCATION/TRAINING PROGRAM

## 2024-03-08 PROCEDURE — 3600000002 HC OR TIME - INITIAL BASE CHARGE - PROCEDURE LEVEL TWO: Performed by: STUDENT IN AN ORGANIZED HEALTH CARE EDUCATION/TRAINING PROGRAM

## 2024-03-08 PROCEDURE — 76872 US TRANSRECTAL: CPT | Performed by: STUDENT IN AN ORGANIZED HEALTH CARE EDUCATION/TRAINING PROGRAM

## 2024-03-08 PROCEDURE — 7100000010 HC PHASE TWO TIME - EACH INCREMENTAL 1 MINUTE: Performed by: STUDENT IN AN ORGANIZED HEALTH CARE EDUCATION/TRAINING PROGRAM

## 2024-03-08 PROCEDURE — 2500000005 HC RX 250 GENERAL PHARMACY W/O HCPCS: Performed by: STUDENT IN AN ORGANIZED HEALTH CARE EDUCATION/TRAINING PROGRAM

## 2024-03-08 PROCEDURE — G0416 PROSTATE BIOPSY, ANY MTHD: HCPCS | Performed by: PATHOLOGY

## 2024-03-08 PROCEDURE — 99100 ANES PT EXTEME AGE<1 YR&>70: CPT | Performed by: STUDENT IN AN ORGANIZED HEALTH CARE EDUCATION/TRAINING PROGRAM

## 2024-03-08 PROCEDURE — 2500000004 HC RX 250 GENERAL PHARMACY W/ HCPCS (ALT 636 FOR OP/ED): Performed by: NURSE ANESTHETIST, CERTIFIED REGISTERED

## 2024-03-08 PROCEDURE — 3600000007 HC OR TIME - EACH INCREMENTAL 1 MINUTE - PROCEDURE LEVEL TWO: Performed by: STUDENT IN AN ORGANIZED HEALTH CARE EDUCATION/TRAINING PROGRAM

## 2024-03-08 PROCEDURE — A55700 PR BIOPSY OF PROSTATE,NEEDLE/PUNCH: Performed by: STUDENT IN AN ORGANIZED HEALTH CARE EDUCATION/TRAINING PROGRAM

## 2024-03-08 PROCEDURE — 3700000001 HC GENERAL ANESTHESIA TIME - INITIAL BASE CHARGE: Performed by: STUDENT IN AN ORGANIZED HEALTH CARE EDUCATION/TRAINING PROGRAM

## 2024-03-08 PROCEDURE — 3700000002 HC GENERAL ANESTHESIA TIME - EACH INCREMENTAL 1 MINUTE: Performed by: STUDENT IN AN ORGANIZED HEALTH CARE EDUCATION/TRAINING PROGRAM

## 2024-03-08 PROCEDURE — 55706 BX PRST8 NDL SAT SAMPLING: CPT | Performed by: STUDENT IN AN ORGANIZED HEALTH CARE EDUCATION/TRAINING PROGRAM

## 2024-03-08 RX ORDER — LABETALOL HYDROCHLORIDE 5 MG/ML
5 INJECTION, SOLUTION INTRAVENOUS ONCE AS NEEDED
Status: DISCONTINUED | OUTPATIENT
Start: 2024-03-08 | End: 2024-03-08 | Stop reason: HOSPADM

## 2024-03-08 RX ORDER — OXYCODONE HYDROCHLORIDE 5 MG/1
5 TABLET ORAL EVERY 4 HOURS PRN
Status: DISCONTINUED | OUTPATIENT
Start: 2024-03-08 | End: 2024-03-08 | Stop reason: HOSPADM

## 2024-03-08 RX ORDER — DIPHENHYDRAMINE HYDROCHLORIDE 50 MG/ML
12.5 INJECTION INTRAMUSCULAR; INTRAVENOUS ONCE AS NEEDED
Status: DISCONTINUED | OUTPATIENT
Start: 2024-03-08 | End: 2024-03-08 | Stop reason: HOSPADM

## 2024-03-08 RX ORDER — PROPOFOL 10 MG/ML
INJECTION, EMULSION INTRAVENOUS CONTINUOUS PRN
Status: DISCONTINUED | OUTPATIENT
Start: 2024-03-08 | End: 2024-03-08

## 2024-03-08 RX ORDER — SODIUM CHLORIDE 9 MG/ML
100 INJECTION, SOLUTION INTRAVENOUS CONTINUOUS
Status: DISCONTINUED | OUTPATIENT
Start: 2024-03-08 | End: 2024-03-08 | Stop reason: HOSPADM

## 2024-03-08 RX ORDER — SODIUM CHLORIDE, SODIUM LACTATE, POTASSIUM CHLORIDE, CALCIUM CHLORIDE 600; 310; 30; 20 MG/100ML; MG/100ML; MG/100ML; MG/100ML
100 INJECTION, SOLUTION INTRAVENOUS CONTINUOUS
Status: DISCONTINUED | OUTPATIENT
Start: 2024-03-08 | End: 2024-03-08 | Stop reason: HOSPADM

## 2024-03-08 RX ORDER — LIDOCAINE HYDROCHLORIDE 10 MG/ML
0.1 INJECTION, SOLUTION EPIDURAL; INFILTRATION; INTRACAUDAL; PERINEURAL ONCE
Status: DISCONTINUED | OUTPATIENT
Start: 2024-03-08 | End: 2024-03-08 | Stop reason: HOSPADM

## 2024-03-08 RX ORDER — MIDAZOLAM HYDROCHLORIDE 1 MG/ML
INJECTION INTRAMUSCULAR; INTRAVENOUS AS NEEDED
Status: DISCONTINUED | OUTPATIENT
Start: 2024-03-08 | End: 2024-03-08

## 2024-03-08 RX ORDER — ONDANSETRON HYDROCHLORIDE 2 MG/ML
4 INJECTION, SOLUTION INTRAVENOUS ONCE AS NEEDED
Status: DISCONTINUED | OUTPATIENT
Start: 2024-03-08 | End: 2024-03-08 | Stop reason: HOSPADM

## 2024-03-08 RX ORDER — LIDOCAINE HYDROCHLORIDE 20 MG/ML
INJECTION, SOLUTION EPIDURAL; INFILTRATION; INTRACAUDAL; PERINEURAL AS NEEDED
Status: DISCONTINUED | OUTPATIENT
Start: 2024-03-08 | End: 2024-03-08

## 2024-03-08 RX ADMIN — PROPOFOL 200 MCG/KG/MIN: 10 INJECTION, EMULSION INTRAVENOUS at 10:53

## 2024-03-08 RX ADMIN — Medication 6 L/MIN: at 11:10

## 2024-03-08 RX ADMIN — LIDOCAINE HYDROCHLORIDE 60 MG: 20 INJECTION, SOLUTION EPIDURAL; INFILTRATION; INTRACAUDAL; PERINEURAL at 10:53

## 2024-03-08 RX ADMIN — MIDAZOLAM HYDROCHLORIDE 2 MG: 1 INJECTION, SOLUTION INTRAMUSCULAR; INTRAVENOUS at 10:53

## 2024-03-08 RX ADMIN — SODIUM CHLORIDE, SODIUM LACTATE, POTASSIUM CHLORIDE, AND CALCIUM CHLORIDE: 600; 310; 30; 20 INJECTION, SOLUTION INTRAVENOUS at 10:47

## 2024-03-08 ASSESSMENT — PAIN - FUNCTIONAL ASSESSMENT
PAIN_FUNCTIONAL_ASSESSMENT: UNABLE TO SELF-REPORT
PAIN_FUNCTIONAL_ASSESSMENT: 0-10
PAIN_FUNCTIONAL_ASSESSMENT: 0-10
PAIN_FUNCTIONAL_ASSESSMENT: UNABLE TO SELF-REPORT
PAIN_FUNCTIONAL_ASSESSMENT: 0-10
PAIN_FUNCTIONAL_ASSESSMENT: 0-10

## 2024-03-08 ASSESSMENT — PAIN SCALES - GENERAL
PAINLEVEL_OUTOF10: 0 - NO PAIN
PAINLEVEL_OUTOF10: 2

## 2024-03-08 NOTE — DISCHARGE INSTRUCTIONS
DEPARTMENT OF UROLOGY  DISCHARGE INSTRUCTIONS PROSTATE BIOPSY  Outpatient Surgery    C O N F I D E N T I A L   I N F O R M A T I O N    Christiano Masters        Call [phone] during regular daytime business hours (8:00 am - 5:00 pm) and after 5:00 pm and ask for the Urology Resident with any questions or concerns.      If it is a life-threatening situation, proceed to the nearest emergency department.             Thank you for the opportunity to care for you today.  Your health and healing are very important to us.  We hope we made you feel as comfortable as possible and are committed to your recovery and continued well-being.      The following is a brief overview of your prostate biopsy today. Some of the information contained on this summary may be confidential.  This information should be kept in your records and should be shared with your regular doctor.    Physicians:   Dr. Meyer      Procedure performed: prostate biopsy  Pending results:   prostate pathology results    What to Expect During your Recovery and Home Care  Anesthesia Side Effects   You received anesthesia today.  You may feel sleepy, tired, or have a sore throat.   You may also feel drowsiness, dizziness, or inability to think clearly.  For your safety, do not drive, drink alcoholic beverages, take any unprescribed medication or make any important decisions for 24 hours.  A responsible adult should be with you for 24 hours.        Activity and Recovery    No heavy lifting or strenuous activity for several days. Avoid activities that put pressure on your rectal area. Do not have sex for a few days.      Pain Control  Unfortunately, you may experience pain after your procedure.  Adequate management can include alternative measures to help ease your pain and can include ice packs, and over the counter Tylenol can be taken as prescribed as needed for breakthrough pain. Do not take more then 4,000mg of Tylenol in a 24-hour period.       Nausea/Vomiting   Clear liquids are best tolerated at first. Start slow, advance your diet as tolerated to normal foods. Avoid spicy, greasy, heavy foods at first. Also, you may feel nauseous or like you need to vomit if you take any type of medication on an empty stomach.  Call your physician if you are unable to eat or drink and have persistent vomiting.    Signs of Bleeding   Minor bleeding or drainage may occur from your rectum however, excessive or consistent bleeding should be reported to your surgeon. Excessive bleeding is defined as blood that is dripping from rectum, soaking through your pants, and is ketchup colored, thick with possible blood clots.  Consistent is defined as bleeding that does not stop. You may have blood in your poop, urine and semen for several weeks.     Treatment/wound care:   It is okay to shower 24 hours after time of surgery.      Signs of Infection  Signs of infection can include fever, burning sensation with urination, frequency, urgency or severe pain.  If you see any of these occur, please contact your doctor's office at [phone].  Any fever higher than 100.4, especially if associated with an ill feeling, abdominal pain, chills, or nausea should be reported to your surgeon.      Assist in bowel movements/urination  Increase fiber in diet  Urination should occur within 6 hours of anesthesia.   Increase water (6 to 8 glasses)  Increase walking   If you have tried these methods and your bladder still feels full and you cannot use the bathroom, please go to your nearest Emergency room.    Additional Instructions:   Try not to strain when going to the bathroom. Do not hold your urine. We will go over your biopsy results at your follow up appointment. It takes 7-10 business days for the results to come back.

## 2024-03-08 NOTE — ANESTHESIA POSTPROCEDURE EVALUATION
Patient: Christiano Masters    Procedure Summary       Date: 03/08/24 Room / Location: [facility] OR 04 / Virtual [facility]    Anesthesia Start: 1050 Anesthesia Stop: 1112    Procedure: Transperineal Biopsy Prostate Diagnosis:       Malignant neoplasm of prostate (CMS/HCC)      (Malignant neoplasm of prostate (CMS/HCC) [C61])    Surgeons: Dima Meyer MD Responsible Provider: Irving Owens MD    Anesthesia Type: MAC ASA Status: 3            Anesthesia Type: MAC    Vitals Value Taken Time   /82 03/08/24 1148   Temp 36 °C (96.8 °F) 03/08/24 1148   Pulse 59 03/08/24 1148   Resp 17 03/08/24 1148   SpO2 98 % 03/08/24 1148       Anesthesia Post Evaluation    Patient location during evaluation: bedside  Patient participation: complete - patient participated  Level of consciousness: awake  Pain management: adequate  Multimodal analgesia pain management approach  Airway patency: patent  Cardiovascular status: stable  Respiratory status: spontaneous ventilation and unassisted  Hydration status: acceptable  Postoperative Nausea and Vomiting: none  Comments: No significant PONV.        No notable events documented.

## 2024-03-08 NOTE — ANESTHESIA PREPROCEDURE EVALUATION
Patient: Christiano Masters    Procedure Information       Date/Time: 03/08/24 1050    Procedure: Transperineal Biopsy Prostate    Location: [facility] OR 04 / Virtual [facility] OR    Surgeons: Dima Meyer MD          A. Fib HTN CKD Hep C chronic    Relevant Problems   Cardiovascular   (+) A-fib (CMS/HCC)   (+) Abnormal EKG   (+) Atherosclerotic heart disease of native coronary artery without angina pectoris   (+) Atrial fibrillation (CMS/HCC)   (+) Atrial flutter (CMS/HCC)   (+) Atypical chest pain   (+) Chronic atrial fibrillation, unspecified (CMS/HCC)   (+) Episodic atrial fibrillation (CMS/HCC)   (+) Hypercholesterolemia   (+) Hypertension   (+) Hypertension, uncontrolled   (+) Nonrheumatic mitral (valve) insufficiency   (+) Other persistent atrial fibrillation (CMS/HCC)   (+) Persistent atrial fibrillation (CMS/HCC)   (+) Unspecified atrial fibrillation (CMS/HCC)      Endocrine   (+) Drug-induced obesity   (+) Obesity, unspecified      GI   (+) Gastro-esophageal reflux disease without esophagitis      /Renal   (+) Chronic kidney disease, unspecified   (+) Hepatitis C, chronic (CMS/HCC)   (+) Hypertensive chronic kidney disease with stage 1 through stage 4 chronic kidney disease, or unspecified chronic kidney disease      Neuro/Psych   (+) Anxiety   (+) Panic      Pulmonary   (+) JEFFREY (obstructive sleep apnea)   (+) Obstructive sleep apnea syndrome      GI/Hepatic   (+) Hepatitis C, chronic (CMS/HCC)      Hematology   (+) Long term (current) use of anticoagulants      Infectious Disease   (+) Acute upper respiratory infection   (+) Ear infection   (+) Hepatitis C, chronic (CMS/HCC)       Clinical information reviewed:                   NPO Detail:  No data recorded     Physical Exam    Airway  Mallampati: II  TM distance: >3 FB  Neck ROM: full     Cardiovascular   Rhythm: regular  Rate: normal     Dental    Pulmonary   Breath sounds clear to auscultation     Abdominal            Anesthesia Plan    History of general  anesthesia?: yes  History of complications of general anesthesia?: no    ASA 3     MAC     intravenous induction   Anesthetic plan and risks discussed with patient.    Plan discussed with CRNA.

## 2024-03-08 NOTE — OP NOTE
Transperineal Biopsy Prostate Operative Note     Date: 3/8/2024  OR Location: [facility] OR    Name: Christiano Masters, : 1950, Age: 73 y.o., MRN: [id number], Sex: male    Diagnosis  Pre-op Diagnosis     * Malignant neoplasm of prostate (CMS/HCC) [C61] Post-op Diagnosis     * Malignant neoplasm of prostate (CMS/HCC) [C61]     Procedures  Transperineal Biopsy Prostate  43011 - GA PROSTATE NEEDLE BIOPSY ANY APPROACH    GA BIOPSY OF PROSTATE,NEEDLE,TRANSPERINEAL [J43724]  CHG US TRANSRECTAL [[id number]]    Surgeons      * Dima Meyer - Primary    Resident/Fellow/Other Assistant:  Surgeon(s) and Role:     * Napoleon Nelson MD - Resident - Assisting    Procedure Summary  Anesthesia: Monitor Anesthesia Care  ASA: III  Anesthesia Staff: Anesthesiologist: Irving Owens MD  CRNA: MARCIE Waite-CRNA  Estimated Blood Loss: 2mL  Intra-op Medications:   Administrations occurring from 1050 to 1130 on 24:   Medication Name Total Dose   BUPivacaine HCl (Marcaine) 0.5 % (5 mg/mL) 8 mL, lidocaine PF (Xylocaine) 10 mg/mL (1 %) 8 mL, sodium bicarbonate 4 mEq syringe 20 mL              Anesthesia Record               Intraprocedure I/O Totals          Intake    Propofol Drip 0.00 mL    The total shown is the total volume documented since Anesthesia Start was filed.    Total Intake 0 mL          Specimen:   ID Type Source Tests Collected by Time   1 : Left Paramedian [place] Tissue PROSTATE NEEDLE BIOPSY LEFT SURGICAL PATHOLOGY EXAM Dima Meyer MD 3/8/2024 1058   2 : Left Paramedian [place] Tissue PROSTATE NEEDLE BIOPSY LEFT SURGICAL PATHOLOGY EXAM Dima Meyer MD 3/8/2024 1058   3 : Left Posterior [place] Tissue PROSTATE NEEDLE BIOPSY LEFT SURGICAL PATHOLOGY EXAM Dima Meyer MD 3/8/2024 1058   4 : Left Posterior Base Tissue PROSTATE NEEDLE BIOPSY LEFT SURGICAL PATHOLOGY EXAM Dima Meyer MD 3/8/2024 1058   5 : Left Lateral Tissue PROSTATE NEEDLE BIOPSY LEFT SURGICAL PATHOLOGY EXAM Dima Meyer MD 3/8/2024  "1058   6 : Left Anterior Tissue PROSTATE NEEDLE BIOPSY LEFT SURGICAL PATHOLOGY EXAM Dima Meyer MD 3/8/2024 1058   7 : Right Paramedian [place] Tissue PROSTATE NEEDLE BIOPSY RIGHT SURGICAL PATHOLOGY EXAM Dima Meyer MD 3/8/2024 1058   8 : Right Paramedian Base Tissue PROSTATE NEEDLE BIOPSY RIGHT SURGICAL PATHOLOGY EXAM Dima Meyer MD 3/8/2024 1058   9 : Right Posterior [place] Tissue PROSTATE NEEDLE BIOPSY RIGHT SURGICAL PATHOLOGY EXAM Dima Meyer MD 3/8/2024 1058   10 : Right Posterior Base Tissue PROSTATE NEEDLE BIOPSY RIGHT SURGICAL PATHOLOGY EXAM Dima Meyer MD 3/8/2024 1058   11 : Right Lateral Tissue PROSTATE NEEDLE BIOPSY RIGHT SURGICAL PATHOLOGY EXAM Dima Meyer MD 3/8/2024 1058   12 : Right Anterior Tissue PROSTATE NEEDLE BIOPSY RIGHT SURGICAL PATHOLOGY EXAM Dima Meyer MD 3/8/2024 1058   13 : 1 / 1 Tissue PROSTATE BIOPSY TARGETED ABBY SURGICAL PATHOLOGY EXAM Dima Meyer MD 3/8/2024 1058   14 : 1 / 2 Tissue PROSTATE BIOPSY TARGETED ABBY SURGICAL PATHOLOGY EXAM Dima Meyer MD 3/8/2024 1058   15 : 1 / 3 Tissue PROSTATE BIOPSY TARGETED ABBY SURGICAL PATHOLOGY EXAM Dima Meyer MD 3/8/2024 1058   16 : 1 / 4 Tissue PROSTATE BIOPSY TARGETED ABBY SURGICAL PATHOLOGY EXAM Dima Meyer MD 3/8/2024 1058        Staff:   Circulator: Shanice Martinez RN; Luz Fitzgerald RN  Relief Circulator: Jan Addison RN  Relief Scrub: Catia Mazariegos  Scrub Person: Jess Cabrera         Drains and/or Catheters: * None in log *      Procedure Details:   Preoperative Diagnosis: Prostate cancer; abnormal prostate findings on MRI; PSA of No results found for: \"PSA\";  with  family history of prostate cancer     Postoperative Diagnosis: Same    Operation Performed: MR/TRUS fusion guided biopsy via transperineal approach    Attending: Mitch    Assistant(s):     Anesthesia: Sedation and Local    Preparation: Betadine    EBL: Minimal     Complications: None     Indications for procedure: This 73 y.o. year old male " presents with a history of Prostate cancer s/p radiation with rising PSA and suspected local recurrence on PET.    Prior biopsy: Sal 4+4=8 prostate ca diagnosed in 2014    MRI Findings: 1 ABBY    Procedure and Findings:     The patient was seen in the preoperative area. The risks, benefits, complications, treatment options, non-operative alternatives, expected recovery and outcomes were discussed with the patient. The possibilities of reaction to medication, pulmonary aspiration, injury to surrounding structures, bleeding, recurrent infection, the need for additional procedures, failure to diagnose a condition, and creating a complication requiring transfusion or operation were discussed with the patient. The patient concurred with the proposed plan, giving informed consent.  The site of surgery was properly noted/marked if necessary per policy. The patient has been actively warmed in preoperative area. Preoperative antibiotics are not indicated. Venous thrombosis prophylaxis are not indicated.    The rational for transrectal ultrasound and biopsy of the prostate including risk, benefits and alternatives were discussed. These included risk of increased urination, urinary retention, hematuria, hematospermia, and urosepsis, the patient elected to proceed.      The multiparametric MRI data was imported from the radiology PACS system to the UroNav biopsy platform. The T2-weighted images were reviewed including the segmented prostate boundary and pre-identified suspicious lesions (ROIs).     A procedure time out confirmed the proper patient, and the procedure informed consent form had been signed by the patient.    The patient was placed in lithotomy position. A injection mixture of lidocaine, marcaine and sodium bicarbonate was used as local anesthetic for the skin of the perineum and to perform a periapical block.     The electromagnetic sensor was attached to the ultrasound probe. The ultrasound transducer was  placed into the rectum. Positioning of the probe and sensor within the generated EM-field was confirmed.     A comprehensive TRUS survey was performed with the prostate visualized in both the transverse and sagittal planes. There were not benign calcifications seen on ultrasound. In addition to the MRI fusion, there were not hypoechoic lesions suspicious for tumor identified on ultrasound.     3D-Rendering with trus image processing and fusion:    In the axial plane, an ultrasound sweep of the prostate was completed from base to apex. The serial axial image slices were marked by annotating the anterior, posterior, left, right, base and apical points for semi-automatic segmentation of the prostate. Manual adjustments of the prostate US segmentation was performed in the axial, sagittal and coronal views rendering a 3-D data set/US volume.     Initial rotational co-registration was performed by blending MR images that were overlaid on the US images. The urethra, bladder neck, bladder and posterior surface of the prostate were identified on both MRI and US.     The images were rotated to ensure corresponding anatomy was correctly aligned.     Elastic registration was then calculated for use if required.     The MRI and Ultrasound were then registered using co-display of the images verifying that base, apex, left and right sides of the prostate were correctly aligned. Manual corrections were performed where need. Additional co-registration of the internal fiducials including prostatic cyst and the pubis were performed.     Prostate Biopsy:     At this point, accurate co-registration/fusion was confirmed. Ultrasound was used to navigate to the centroid target and lesion volume. The segmented ROIs were targeted and biopsied with ultrasound guidance taking four cores from each target. I was satisfied with the location of the targeted biopsies obtained. An additional 12 core, standard, systematic random biopsy was performed  for a total of 16 cores of tissue obtained during this biopsy session.      Disposition  Patient tolerated the procedure well and will follow-up for an outpatient appointment to discuss pathology.      Complications:  None; patient tolerated the procedure well.    Disposition: PACU - hemodynamically stable.  Condition: stable           Attending Attestation:     Dima Meyer  Phone Number: [phone]

## 2024-03-08 NOTE — PERIOPERATIVE NURSING NOTE
1110 Pt arrived to pacu bay at this time, report received from OR and anesthesia staff. Phase 1 care started.   1116 Message sent to family via text at this time.   1125 pt becoming more awake, pt provided with ginger ale tolerating well.   1129 spoke with pt daughter, updated on plan of care.   1130 pt provided with pretzels, tolerating without issue.   1140 pt provided with pudding, tolerating without issue.  1143 Dr. Meyer bedside  1148 Pt meeting criteria for phase 1 dc  1149 Pt into phase 2 status

## 2024-03-11 ASSESSMENT — PAIN SCALES - GENERAL: PAINLEVEL_OUTOF10: 5 - MODERATE PAIN

## 2024-03-13 ENCOUNTER — TELEPHONE (OUTPATIENT)
Dept: PRIMARY CARE | Facility: HOSPITAL | Age: 74
End: 2024-03-13
Payer: MEDICARE

## 2024-03-13 DIAGNOSIS — I10 HYPERTENSION, UNSPECIFIED TYPE: ICD-10-CM

## 2024-03-13 RX ORDER — LOSARTAN POTASSIUM AND HYDROCHLOROTHIAZIDE 12.5; 1 MG/1; MG/1
1 TABLET ORAL DAILY
Qty: 30 TABLET | Refills: 11 | Status: SHIPPED | OUTPATIENT
Start: 2024-03-13 | End: 2024-04-23 | Stop reason: SDUPTHER

## 2024-03-15 LAB
LAB AP ASR DISCLAIMER: NORMAL
LABORATORY COMMENT REPORT: NORMAL
PATH REPORT.FINAL DX SPEC: NORMAL
PATH REPORT.GROSS SPEC: NORMAL
PATH REPORT.RELEVANT HX SPEC: NORMAL
PATH REPORT.TOTAL CANCER: NORMAL

## 2024-04-01 ENCOUNTER — APPOINTMENT (OUTPATIENT)
Dept: UROLOGY | Facility: HOSPITAL | Age: 74
End: 2024-04-01
Payer: MEDICARE

## 2024-04-15 ENCOUNTER — OFFICE VISIT (OUTPATIENT)
Dept: UROLOGY | Facility: HOSPITAL | Age: 74
End: 2024-04-15
Payer: MEDICARE

## 2024-04-15 DIAGNOSIS — C61 LOCAL RECURRENCE OF PROSTATE CANCER (MULTI): Primary | ICD-10-CM

## 2024-04-15 DIAGNOSIS — C61 PROSTATE CANCER (MULTI): ICD-10-CM

## 2024-04-15 PROCEDURE — 99213 OFFICE O/P EST LOW 20 MIN: CPT | Performed by: STUDENT IN AN ORGANIZED HEALTH CARE EDUCATION/TRAINING PROGRAM

## 2024-04-15 PROCEDURE — G2211 COMPLEX E/M VISIT ADD ON: HCPCS | Performed by: STUDENT IN AN ORGANIZED HEALTH CARE EDUCATION/TRAINING PROGRAM

## 2024-04-15 PROCEDURE — 1159F MED LIST DOCD IN RCRD: CPT | Performed by: STUDENT IN AN ORGANIZED HEALTH CARE EDUCATION/TRAINING PROGRAM

## 2024-04-15 NOTE — H&P (VIEW-ONLY)
"UROLOGIC Follow-Up  EVALUATION       PROBLEM LIST:  1. Prostate cancer (Multi)             HISTORY OF PRESENT ILLNESS:   Mr. Christiano Masters is a 73-year-old with a history of prostate adenocarcinoma with biochemical recurrence. 6/11/2014 systematic prostate biopsy noted [place] 4+4, PNI positive. RT 11/10/2014 to 1/13/2015 completed. 01/2016 last injection of ADT, completed 18/ 24 months and stopped due to side effects. His PSA has been slowly rising, most recently 5.97 12/21/2023.  He underwent repeat prostate biopsy on 3/8/2024 and presents today to discuss pathology.     Patient states he has not had any residual pain from his biopsy and is not having any hematuria.  Denies any fevers, chills, nausea, vomiting.    PAST MEDICAL HISTORY:  Past Medical History:   Diagnosis Date    A-fib (Multi)     on Eliquis, pre surgery stop instructions and clearance in 2/9/24 \"letters\"    Acute hepatitis C     Genotype 1a, s/p Harvoni [id number]    Anxiety     ASCVD (arteriosclerotic cardiovascular disease)     Benign prostatic hyperplasia with lower urinary tract symptoms     Chronic pain disorder     back    CKD (chronic kidney disease)     CKD (chronic kidney disease)     Cluster headache, chronic     Elevated PSA 12/21/2023    5.97    GERD (gastroesophageal reflux disease)     Hepatitis B     + antibody    Hyperaldosteronism (Multi)     Hypercholesteremia     Hypertension     Mitral valve insufficiency     Monoclonal gammopathy     MGUS followed by Hem/Onc Dr. Ochoa    JEFFREY (obstructive sleep apnea)     Prostate cancer (Multi)     RT and ADT [id number]    Tobacco use disorder        PAST SURGICAL HISTORY:  Past Surgical History:   Procedure Laterality Date    CARDIOVERSION  12/2022    and Ablation for A Fib    COLONOSCOPY  08/26/2019    Colonoscopy    HERNIA REPAIR  11/15/2021    Hernia repair    PROSTATE BIOPSY  06/11/2014        ALLERGIES:   No Known Allergies     MEDICATIONS:     Current Outpatient Medications:     " acetaminophen (Tylenol 8 HOUR) 650 mg ER tablet, Take 1 tablet (650 mg) by mouth every 8 hours if needed for mild pain (1 - 3). Do not crush, chew, or split., Disp: , Rfl:     amLODIPine (Norvasc) 10 mg tablet, Take 1 tablet (10 mg) by mouth once daily., Disp: 90 tablet, Rfl: 1    ascorbic acid (VITAMIN C ORAL), Vitamin C TABS 1 TABLET AS NEEDED FOR COLDS Quantity: 0 Refills: 0 Ordered: 18-Apr-2023 DO Active, Disp: , Rfl:     cholecalciferol (Vitamin D-3) 50 MCG (2000 UT) tablet, Take 1 tablet (2,000 Units) by mouth once daily., Disp: , Rfl:     Eliquis 5 mg tablet, Take 1 tablet (5 mg) by mouth 2 times a day., Disp: 180 tablet, Rfl: 3    losartan-hydrochlorothiazide (Hyzaar) 100-12.5 mg tablet, Take 1 tablet by mouth once daily., Disp: 30 tablet, Rfl: 11    metoprolol tartrate (Lopressor) 25 mg tablet, Take 1 tablet (25 mg) by mouth 2 times a day., Disp: 180 tablet, Rfl: 3    pravastatin (Pravachol) 40 mg tablet, Take 1 tablet (40 mg) by mouth once daily at bedtime., Disp: 30 tablet, Rfl: 11    tamsulosin (Flomax) 0.4 mg 24 hr capsule, Take 2 capsules (0.8 mg) by mouth once daily., Disp: 180 capsule, Rfl: 2      SOCIAL HISTORY:  Patient  reports that he has never smoked. He has never used smokeless tobacco. He reports that he does not drink alcohol and does not use drugs.   Social History     Socioeconomic History    Marital status:      Spouse name: Not on file    Number of children: Not on file    Years of education: Not on file    Highest education level: Not on file   Occupational History    Not on file   Tobacco Use    Smoking status: Never    Smokeless tobacco: Never   Vaping Use    Vaping status: Never Used   Substance and Sexual Activity    Alcohol use: Never    Drug use: Never    Sexual activity: Yes   Other Topics Concern    Not on file   Social History Narrative    Not on file     Social Determinants of Health     Financial Resource Strain: Not on file   Food Insecurity: No Food Insecurity  (2/6/2024)    Hunger Vital Sign     Worried About Running Out of Food in the Last Year: Never true     Ran Out of Food in the Last Year: Never true   Transportation Needs: Not on file   Physical Activity: Not on file   Stress: Not on file   Social Connections: Not on file   Intimate Partner Violence: Not on file   Housing Stability: Not on file       FAMILY HISTORY:  Family History   Family history unknown: Yes       REVIEW OF SYSTEMS:  Constitutional: Negative for fever and chills. Denies anorexia, weight loss.  Eyes: Negative for visual disturbance.   Respiratory: Negative for shortness of breath.    Cardiovascular: Negative for chest pain.   Gastrointestinal: Negative for nausea and vomiting.   Genitourinary: See interval history above.  Skin: Negative for rash.   Neurological: Negative for dizziness and numbness.   Psychiatric/Behavioral: Negative for confusion and decreased concentration.     PHYSICAL EXAM:  There were no vitals taken for this visit.  Constitutional: Patient appears well-developed and well-nourished. No distress.    Head: Normocephalic and atraumatic.    Neck: Normal range of motion.    Cardiovascular: Normal rate.    Pulmonary/Chest: Effort normal. No respiratory distress.   Abdominal: Soft, nontender nondistended  Musculoskeletal: Normal range of motion.    Neurological: Alert and oriented to person, place, and time.  Psychiatric: Normal mood and affect. Behavior is normal. Thought content normal.      LABORATORY REVIEW:     Lab Results   Component Value Date    BUN 14 02/29/2024    CREATININE 1.11 02/29/2024    EGFR 70 02/29/2024     02/29/2024    K 3.3 (L) 02/29/2024     02/29/2024    CO2 33 (H) 02/29/2024    CALCIUM 9.0 02/29/2024      Lab Results   Component Value Date    WBC 3.7 (L) 02/29/2024    RBC 5.36 02/29/2024    HGB 14.3 02/29/2024    HCT 44.4 02/29/2024    MCV 83 02/29/2024    MCH 26.7 02/29/2024    MCHC 32.2 02/29/2024    RDW 14.1 02/29/2024     02/29/2024         Lab Results   Component Value Date    PSA 5.97 (H) 12/21/2023    PSA 3.46 08/17/2023    PSA 2.84 05/22/2023    PSA 3.02 02/22/2023    PSA 1.93 07/07/2022    PSA 1.75 05/19/2022    PSA 1.52 12/20/2021    PSA 0.47 06/17/2021    PSA 0.24 12/15/2020    PSA 0.37 06/15/2020    PSA 0.34 09/17/2019    PSA 0.26 12/08/2017      Assessment:     1. Local recurrence of prostate cancer (Multi)        2. Prostate cancer (Multi)  Case Request Operating Room: Insertion SpacOAR    Case Request Operating Room: Insertion SpacOAR        Plan:   Reviewed and interpreted patient's pathology results  Counseled patient that based off his results he does have cancer recurrence  We discussed that this would require additional treatment  Discussed with patient and Dr. Narvaez may want him to undergo SpaceOAR hydrogel placement, we discussed the steps that his procedure as well as the anticipated recovery  Patient will follow-up with Dr. Narvaez and return if spacer placement is needed    28 minutes total spent on patient's care today; >50% time spent on counseling/coordination of care

## 2024-04-15 NOTE — PROGRESS NOTES
"UROLOGIC Follow-Up  EVALUATION       PROBLEM LIST:  1. Prostate cancer (Multi)             HISTORY OF PRESENT ILLNESS:   Mr. Christiano Masters is a 73-year-old with a history of prostate adenocarcinoma with biochemical recurrence. 6/11/2014 systematic prostate biopsy noted [place] 4+4, PNI positive. RT 11/10/2014 to 1/13/2015 completed. 01/2016 last injection of ADT, completed 18/ 24 months and stopped due to side effects. His PSA has been slowly rising, most recently 5.97 12/21/2023.  He underwent repeat prostate biopsy on 3/8/2024 and presents today to discuss pathology.     Patient states he has not had any residual pain from his biopsy and is not having any hematuria.  Denies any fevers, chills, nausea, vomiting.    PAST MEDICAL HISTORY:  Past Medical History:   Diagnosis Date    A-fib (Multi)     on Eliquis, pre surgery stop instructions and clearance in 2/9/24 \"letters\"    Acute hepatitis C     Genotype 1a, s/p Harvoni [id number]    Anxiety     ASCVD (arteriosclerotic cardiovascular disease)     Benign prostatic hyperplasia with lower urinary tract symptoms     Chronic pain disorder     back    CKD (chronic kidney disease)     CKD (chronic kidney disease)     Cluster headache, chronic     Elevated PSA 12/21/2023    5.97    GERD (gastroesophageal reflux disease)     Hepatitis B     + antibody    Hyperaldosteronism (Multi)     Hypercholesteremia     Hypertension     Mitral valve insufficiency     Monoclonal gammopathy     MGUS followed by Hem/Onc Dr. Ochoa    JEFFREY (obstructive sleep apnea)     Prostate cancer (Multi)     RT and ADT [id number]    Tobacco use disorder        PAST SURGICAL HISTORY:  Past Surgical History:   Procedure Laterality Date    CARDIOVERSION  12/2022    and Ablation for A Fib    COLONOSCOPY  08/26/2019    Colonoscopy    HERNIA REPAIR  11/15/2021    Hernia repair    PROSTATE BIOPSY  06/11/2014        ALLERGIES:   No Known Allergies     MEDICATIONS:     Current Outpatient Medications:     " acetaminophen (Tylenol 8 HOUR) 650 mg ER tablet, Take 1 tablet (650 mg) by mouth every 8 hours if needed for mild pain (1 - 3). Do not crush, chew, or split., Disp: , Rfl:     amLODIPine (Norvasc) 10 mg tablet, Take 1 tablet (10 mg) by mouth once daily., Disp: 90 tablet, Rfl: 1    ascorbic acid (VITAMIN C ORAL), Vitamin C TABS 1 TABLET AS NEEDED FOR COLDS Quantity: 0 Refills: 0 Ordered: 18-Apr-2023 DO Active, Disp: , Rfl:     cholecalciferol (Vitamin D-3) 50 MCG (2000 UT) tablet, Take 1 tablet (2,000 Units) by mouth once daily., Disp: , Rfl:     Eliquis 5 mg tablet, Take 1 tablet (5 mg) by mouth 2 times a day., Disp: 180 tablet, Rfl: 3    losartan-hydrochlorothiazide (Hyzaar) 100-12.5 mg tablet, Take 1 tablet by mouth once daily., Disp: 30 tablet, Rfl: 11    metoprolol tartrate (Lopressor) 25 mg tablet, Take 1 tablet (25 mg) by mouth 2 times a day., Disp: 180 tablet, Rfl: 3    pravastatin (Pravachol) 40 mg tablet, Take 1 tablet (40 mg) by mouth once daily at bedtime., Disp: 30 tablet, Rfl: 11    tamsulosin (Flomax) 0.4 mg 24 hr capsule, Take 2 capsules (0.8 mg) by mouth once daily., Disp: 180 capsule, Rfl: 2      SOCIAL HISTORY:  Patient  reports that he has never smoked. He has never used smokeless tobacco. He reports that he does not drink alcohol and does not use drugs.   Social History     Socioeconomic History    Marital status:      Spouse name: Not on file    Number of children: Not on file    Years of education: Not on file    Highest education level: Not on file   Occupational History    Not on file   Tobacco Use    Smoking status: Never    Smokeless tobacco: Never   Vaping Use    Vaping status: Never Used   Substance and Sexual Activity    Alcohol use: Never    Drug use: Never    Sexual activity: Yes   Other Topics Concern    Not on file   Social History Narrative    Not on file     Social Determinants of Health     Financial Resource Strain: Not on file   Food Insecurity: No Food Insecurity  (2/6/2024)    Hunger Vital Sign     Worried About Running Out of Food in the Last Year: Never true     Ran Out of Food in the Last Year: Never true   Transportation Needs: Not on file   Physical Activity: Not on file   Stress: Not on file   Social Connections: Not on file   Intimate Partner Violence: Not on file   Housing Stability: Not on file       FAMILY HISTORY:  Family History   Family history unknown: Yes       REVIEW OF SYSTEMS:  Constitutional: Negative for fever and chills. Denies anorexia, weight loss.  Eyes: Negative for visual disturbance.   Respiratory: Negative for shortness of breath.    Cardiovascular: Negative for chest pain.   Gastrointestinal: Negative for nausea and vomiting.   Genitourinary: See interval history above.  Skin: Negative for rash.   Neurological: Negative for dizziness and numbness.   Psychiatric/Behavioral: Negative for confusion and decreased concentration.     PHYSICAL EXAM:  There were no vitals taken for this visit.  Constitutional: Patient appears well-developed and well-nourished. No distress.    Head: Normocephalic and atraumatic.    Neck: Normal range of motion.    Cardiovascular: Normal rate.    Pulmonary/Chest: Effort normal. No respiratory distress.   Abdominal: Soft, nontender nondistended  Musculoskeletal: Normal range of motion.    Neurological: Alert and oriented to person, place, and time.  Psychiatric: Normal mood and affect. Behavior is normal. Thought content normal.      LABORATORY REVIEW:     Lab Results   Component Value Date    BUN 14 02/29/2024    CREATININE 1.11 02/29/2024    EGFR 70 02/29/2024     02/29/2024    K 3.3 (L) 02/29/2024     02/29/2024    CO2 33 (H) 02/29/2024    CALCIUM 9.0 02/29/2024      Lab Results   Component Value Date    WBC 3.7 (L) 02/29/2024    RBC 5.36 02/29/2024    HGB 14.3 02/29/2024    HCT 44.4 02/29/2024    MCV 83 02/29/2024    MCH 26.7 02/29/2024    MCHC 32.2 02/29/2024    RDW 14.1 02/29/2024     02/29/2024         Lab Results   Component Value Date    PSA 5.97 (H) 12/21/2023    PSA 3.46 08/17/2023    PSA 2.84 05/22/2023    PSA 3.02 02/22/2023    PSA 1.93 07/07/2022    PSA 1.75 05/19/2022    PSA 1.52 12/20/2021    PSA 0.47 06/17/2021    PSA 0.24 12/15/2020    PSA 0.37 06/15/2020    PSA 0.34 09/17/2019    PSA 0.26 12/08/2017      Assessment:     1. Local recurrence of prostate cancer (Multi)        2. Prostate cancer (Multi)  Case Request Operating Room: Insertion SpacOAR    Case Request Operating Room: Insertion SpacOAR        Plan:   Reviewed and interpreted patient's pathology results  Counseled patient that based off his results he does have cancer recurrence  We discussed that this would require additional treatment  Discussed with patient and Dr. Narvaez may want him to undergo SpaceOAR hydrogel placement, we discussed the steps that his procedure as well as the anticipated recovery  Patient will follow-up with Dr. Narvaez and return if spacer placement is needed    28 minutes total spent on patient's care today; >50% time spent on counseling/coordination of care

## 2024-04-17 RX ORDER — SODIUM CHLORIDE 9 MG/ML
100 INJECTION, SOLUTION INTRAVENOUS CONTINUOUS
Status: CANCELLED | OUTPATIENT
Start: 2024-04-17

## 2024-04-17 RX ORDER — CEFAZOLIN SODIUM 2 G/100ML
2 INJECTION, SOLUTION INTRAVENOUS ONCE
Status: CANCELLED | OUTPATIENT
Start: 2024-04-17 | End: 2024-04-17

## 2024-04-18 ENCOUNTER — OFFICE VISIT (OUTPATIENT)
Dept: HEMATOLOGY/ONCOLOGY | Facility: HOSPITAL | Age: 74
End: 2024-04-18
Payer: MEDICARE

## 2024-04-18 ENCOUNTER — LAB (OUTPATIENT)
Dept: LAB | Facility: HOSPITAL | Age: 74
End: 2024-04-18
Payer: MEDICARE

## 2024-04-18 ENCOUNTER — TELEPHONE (OUTPATIENT)
Dept: RADIATION ONCOLOGY | Facility: HOSPITAL | Age: 74
End: 2024-04-18
Payer: MEDICARE

## 2024-04-18 VITALS
OXYGEN SATURATION: 100 % | HEIGHT: 69 IN | WEIGHT: 217.59 LBS | HEART RATE: 53 BPM | BODY MASS INDEX: 32.23 KG/M2 | TEMPERATURE: 97.2 F | DIASTOLIC BLOOD PRESSURE: 74 MMHG | RESPIRATION RATE: 16 BRPM | SYSTOLIC BLOOD PRESSURE: 139 MMHG

## 2024-04-18 DIAGNOSIS — C61 MALIGNANT NEOPLASM OF PROSTATE (MULTI): ICD-10-CM

## 2024-04-18 LAB
ALBUMIN SERPL BCP-MCNC: 4.4 G/DL (ref 3.4–5)
ALP SERPL-CCNC: 69 U/L (ref 33–136)
ALT SERPL W P-5'-P-CCNC: 14 U/L (ref 10–52)
ANION GAP SERPL CALC-SCNC: 11 MMOL/L (ref 10–20)
AST SERPL W P-5'-P-CCNC: 18 U/L (ref 9–39)
BILIRUB SERPL-MCNC: 0.9 MG/DL (ref 0–1.2)
BUN SERPL-MCNC: 17 MG/DL (ref 6–23)
CALCIUM SERPL-MCNC: 9.2 MG/DL (ref 8.6–10.3)
CHLORIDE SERPL-SCNC: 101 MMOL/L (ref 98–107)
CO2 SERPL-SCNC: 32 MMOL/L (ref 21–32)
CREAT SERPL-MCNC: 1.1 MG/DL (ref 0.5–1.3)
EGFRCR SERPLBLD CKD-EPI 2021: 71 ML/MIN/1.73M*2
GLUCOSE SERPL-MCNC: 126 MG/DL (ref 74–99)
POTASSIUM SERPL-SCNC: 3.2 MMOL/L (ref 3.5–5.3)
PROT SERPL-MCNC: 7.5 G/DL (ref 6.4–8.2)
PSA SERPL-MCNC: 6.28 NG/ML
SODIUM SERPL-SCNC: 141 MMOL/L (ref 136–145)
TESTOST SERPL-MCNC: 568 NG/DL (ref 240–1000)

## 2024-04-18 PROCEDURE — 36415 COLL VENOUS BLD VENIPUNCTURE: CPT

## 2024-04-18 PROCEDURE — 1159F MED LIST DOCD IN RCRD: CPT | Performed by: INTERNAL MEDICINE

## 2024-04-18 PROCEDURE — 99214 OFFICE O/P EST MOD 30 MIN: CPT | Performed by: INTERNAL MEDICINE

## 2024-04-18 PROCEDURE — 84153 ASSAY OF PSA TOTAL: CPT

## 2024-04-18 PROCEDURE — 3075F SYST BP GE 130 - 139MM HG: CPT | Performed by: INTERNAL MEDICINE

## 2024-04-18 PROCEDURE — 3078F DIAST BP <80 MM HG: CPT | Performed by: INTERNAL MEDICINE

## 2024-04-18 PROCEDURE — 80053 COMPREHEN METABOLIC PANEL: CPT

## 2024-04-18 PROCEDURE — 84403 ASSAY OF TOTAL TESTOSTERONE: CPT

## 2024-04-18 PROCEDURE — 1126F AMNT PAIN NOTED NONE PRSNT: CPT | Performed by: INTERNAL MEDICINE

## 2024-04-18 ASSESSMENT — PAIN SCALES - GENERAL: PAINLEVEL: 0-NO PAIN

## 2024-04-18 NOTE — PROGRESS NOTES
" Medical Oncology Out Patient Clinic Note    Patient's Name: Christiano Masters  MRN: [id number]  Date of Service: 4/18/2024  In- Person Visit: YES    Reason for Visit: FU    HPI: 74 yo AA male known to us with BCR after local definitive RT for HR disease (iPSA 11, [place] 4+4, cN0M0 by conventional scans) diagnosed in 06/2014, treated with 18m ADT (planned for 24m, stopped due to weight gain and hypertension) and definitive radiation to the prostate (79.2Gy in 44 fractions, completed 1/13/2015).   Lost to follow up, lowest PSA was 0.26, with BCR in 02/2022 (PSA 3), PSA most recently 5.97 in 12/23 with PSMA PET/CT suggesting local prostate recurrence (left anterior TZ, SUV 5.5).  PSADT currently ~10 months. He has chronic lower back pain.   MRI and TRUS/Bx done and path now c/w GS8 disease- both side of the gland  Discussed salvage options and he is interested in radiation- has many questions about AEs and despite conversations he has had with Rad Onc it does not sound he has captured the entire data  He is waiting to get spacer schedule and has an [place] with Rad Onc in fe weeks  Still reluctant to do ADT  No new symptoms    Updated PMHx  None    Review of Systems  13 point review negative    Physical Exam:  /74 (BP Location: Right arm, Patient Position: Sitting)   Pulse 53   Temp 36.2 °C (97.2 °F)   Resp 16   Ht 1.759 m (5' 9.25\")   Wt 98.7 kg (217 lb 9.5 oz)   SpO2 100%   BMI 31.90 kg/m²   ECOG Performance Status: 0  HEENT: Normal  ENT: Normal  CV: NA  Pulmonary: NA  GI: NA  : NA  Extremities: No Edema   Neurologic: Normal  Skin: Normal skin turgor  Psych: Appropriate mood      LABS:  FINAL DIAGNOSIS   A. PROSTATE NEEDLE BIOPSY LEFT PARAMEDIAN APEX:   Prostatic adenocarcinoma, acinar type, Sal score 8 (4+4), grade group 4, involving 1 of 1 cores and approximately 30% of submitted tissue     Note: No intraductal carcinoma or cribriform pattern identified.       B. PROSTATE NEEDLE BIOPSY LEFT " PARAMEDIAN BASE:   Benign prostatic tissue     Note: Immunostain cocktail PIN4 is consistent with the above diagnosis.     C. PROSTATE NEEDLE BIOPSY LEFT POSTERIOR APEX:   Prostatic adenocarcinoma, acinar type, [place] score 7 (4+3), grade group 3, involving 1 of 1 cores and approximately 20% of submitted tissue  Positive for perineural invasion     Note: No intraductal carcinoma or cribriform pattern identified.       D. PROSTATE NEEDLE BIOPSY LEFT POSTERIOR BASE:   Benign prostatic tissue     E. PROSTATE NEEDLE BIOPSY LEFT LATERAL:   Prostatic adenocarcinoma, acinar type, Sal score 7 (4+3), grade group 3, involving 1 of 1 cores and approximately 70% of submitted tissue     Note: No intraductal carcinoma or cribriform pattern identified.  Immunostain cocktail PIN4 is consistent with the above diagnosis.     F. PROSTATE NEEDLE BIOPSY LEFT ANTERIOR:   Prostatic adenocarcinoma, acinar type, Sal score 7 (4+3), grade group 3, involving 1 of 1 cores and approximately 50% of submitted tissue     Note: No intraductal carcinoma or cribriform pattern identified.      G. PROSTATE NEEDLE BIOPSY RIGHT PARAMEDIAN APEX:   Prostatic adenocarcinoma, acinar type, [place] score 7 (4+3), grade group 3, involving 1 of 1 cores and approximately 5% of submitted tissue     Note: No intraductal carcinoma or cribriform pattern identified.  Immunostain cocktail PIN4 is consistent with the above diagnosis.     H. PROSTATE NEEDLE BIOPSY RIGHT PARAMEDIAN BASE:   Benign prostatic tissue     Note: Immunostain cocktail PIN4 is consistent with the above diagnosis.     I. PROSTATE NEEDLE BIOPSY RIGHT POSTERIOR APEX:   Prostatic adenocarcinoma, acinar type, Sal score 7 (4+3), grade group 3, involving 1 of 1 cores and approximately 20% of submitted tissue     Note: No intraductal carcinoma or cribriform pattern identified.     J. PROSTATE NEEDLE BIOPSY RIGHT POSTERIOR BASE:   Prostatic adenocarcinoma, acinar type, Sal score 7 (4+3),  grade group 3, involving 1 of 1 cores and approximately 30% of submitted tissue     Note: No intraductal carcinoma or cribriform pattern identified.     K. PROSTATE NEEDLE BIOPSY RIGHT LATERAL:   Prostatic adenocarcinoma, acinar type, Sal score 6 (3+3), grade group 1, involving 1 of 1 cores and approximately 10% of submitted tissue     Note: No intraductal carcinoma identified.  Immunostain cocktail PIN4 is consistent with the above diagnosis.     L. PROSTATE NEEDLE BIOPSY RIGHT ANTERIOR:   Benign prostatic tissue     Note: Immunostain cocktail PIN 4 is consistent with the above diagnosis.     M. PROSTATE BIOPSY TARGETED ABBY 1/1:   Prostatic adenocarcinoma, acinar type, Sal score 7 (4+3), grade group 3, involving 1 of 1 cores and approximately 30% of submitted tissue     Note: No intraductal carcinoma or cribriform pattern identified.     N. PROSTATE BIOPSY TARGETED ABBY 1/2:   Prostatic adenocarcinoma, acinar type, [place] score 8 (4+4), grade group 4, involving 1 of 1 cores and approximately 50% of submitted tissue     Note: No intraductal carcinoma or cribriform pattern identified.     O. PROSTATE BIOPSY TARGETED ABBY 1/3:   Prostatic adenocarcinoma, acinar type, [place] score 8 (4+4), grade group 4, involving 1 of 1 cores and approximately 70% of submitted tissue  Positive for perineural invasion     Note: No intraductal carcinoma or cribriform pattern identified.  Immunostain cocktail PIN4 is consistent with the above diagnosis.     P. PROSTATE BIOPSY TARGETED ABBY 1/4:   Benign fibromuscular tissue      Electronically signed by Mami Manjarrez DO on 3/15/2024 at 1149      [facility]   By the signature on this report, the individual or group listed as making the Final Interpretation/Diagnosis certifies that they have reviewed this case.    Clinical History  [facility]   Pre-op diagnosis:  Malignant neoplasm of prostate (CMS/HCC) [C61]   Gross Description  [facility]   A: Received in formalin, labeled with the patient's name  "and hospital number and \"left paramedian apex\", is a cylindrical fragment of tan soft tissue measuring 1.0 cm in length, and less than 0.1 cm in diameter. The specimen is submitted in toto in one cassette.  RMP  B: Received in formalin, labeled with the patient's name and hospital number and \"left paramedian base\", is a cylindrical fragment of tan soft tissue measuring 1.4 cm in length, and less than 0.1 cm in diameter. The specimen is submitted in toto in one cassette.  RMP  C: Received in formalin, labeled with the patient's name and hospital number and \"left posterior apex\", is a cylindrical fragment of tan soft tissue measuring 1.0 cm in length, and less than 0.1 cm in diameter. The specimen is submitted in toto in one cassette.  RMP  D: Received in formalin, labeled with the patient's name and hospital number and \"left posterior base\", is a cylindrical fragment of tan soft tissue measuring 1.5 cm in length, and less than 0.1 cm in diameter. The specimen is submitted in toto in one cassette.  RMP  E: Received in formalin, labeled with the patient's name and hospital number and \"left lateral\", is a cylindrical fragment of tan soft tissue measuring 1.7 cm in length, and less than 0.1 cm in diameter. The specimen is submitted in toto in one cassette.  RMP  F: Received in formalin, labeled with the patient's name and hospital number and \"left anterior\", is a cylindrical fragment of tan soft tissue measuring 1.1 cm in length, and less than 0.1 cm in diameter. The specimen is submitted in toto in one cassette.  RMP  G: Received in formalin, labeled with the patient's name and hospital number and \"right paramedian apex\", is a cylindrical fragment of tan soft tissue measuring 1.5 cm in length, and less than 0.1 cm in diameter. The specimen is submitted in toto in one cassette.  RMP  H: Received in formalin, labeled with the patient's name and hospital number and \"right paramedian base\", is a cylindrical fragment of tan " "soft tissue measuring 1.3 cm in length, and less than 0.1 cm in diameter. The specimen is submitted in toto in one cassette.  RMP  I: Received in formalin, labeled with the patient's name and hospital number and \"right posterior apex\", is a cylindrical fragment of tan soft tissue measuring 1.4 cm in length, and less than 0.1 cm in diameter. The specimen is submitted in toto in one cassette.  RMP  J: Received in formalin, labeled with the patient's name and hospital number and \"right posterior base\", is a cylindrical fragment of tan soft tissue measuring 1.9 cm in length, and less than 0.1 cm in diameter. The specimen is submitted in toto in one cassette.  RMP  K: Received in formalin, labeled with the patient's name and hospital number and \"right lateral\", is a cylindrical fragment of tan soft tissue measuring 1.7 cm in length, and less than 0.1 cm in diameter. The specimen is submitted in toto in one cassette.  RMP  L: Received in formalin, labeled with the patient's name and hospital number and \"right anterior\", is a cylindrical fragment of tan soft tissue measuring 0.7 cm in length, and less than 0.1 cm in diameter. The specimen is submitted in toto in one cassette.  RMP  M: Received in formalin, labeled with the patient's name and hospital number and \"1/1\", is a cylindrical fragment of tan soft tissue measuring 0.8 cm in length, and less than 0.1 cm in diameter. The specimen is submitted in toto in one cassette.  RMP  N: Received in formalin, labeled with the patient's name and hospital number and \"1/2\", is a cylindrical fragment of tan soft tissue measuring 1.0 cm in length, and less than 0.1 cm in diameter. The specimen is submitted in toto in one cassette.  RMP  O: Received in formalin, labeled with the patient's name and hospital number and \"1/3\", are two cylindrical fragments of tan soft tissue measuring 0.5 cm, and 0.4 cm in length by less than 0.1 cm in diameter. The specimen is submitted in toto in one " "cassette.  RMP  P: Received in formalin, labeled with the patient's name and hospital number and \"1/4\", are two cylindrical fragments of tan soft tissue measuring 0.2 cm, and 0.2 cm in length by less than 0.1 cm in diameter. The specimen is submitted in toto in one cassette.  RMP     Lab Results   Component Value Date    PSA 5.97 (H) 12/21/2023    PSA 3.46 08/17/2023    PSA 2.84 05/22/2023     Lab Results   Component Value Date    WBC 3.7 (L) 02/29/2024    HGB 14.3 02/29/2024    HCT 44.4 02/29/2024    MCV 83 02/29/2024     02/29/2024     Lab Results   Component Value Date    GLUCOSE 107 (H) 02/29/2024    CALCIUM 9.0 02/29/2024     02/29/2024    K 3.3 (L) 02/29/2024    CO2 33 (H) 02/29/2024     02/29/2024    BUN 14 02/29/2024    CREATININE 1.11 02/29/2024     Lab Results   Component Value Date    TESTOSTERONE 603 12/21/2023     Lab Results   Component Value Date    ALT 15 12/21/2023    AST 17 12/21/2023     (H) 05/19/2022    ALKPHOS 75 12/21/2023    BILITOT 0.7 12/21/2023       IMAGING:  Narrative & Impression   Interpreted By:  Hal Madrigal,   STUDY:  NM PET CT PROSTATE PSMA;  12/11/2023 11:15 am      INDICATION:  Signs/Symptoms: Prostate Cancer, rising PSA,  C61: Prostate cancer.      COMPARISON:  None.      ACCESSION NUMBER(S):  [id number]      ORDERING CLINICIAN:  KENNETH SMITH      TECHNIQUE:  DIVISION OF NUCLEAR MEDICINE  POSITRON EMISSION TOMOGRAPHY (PET-CT)      The patient received an intravenous dose of 5.8 mCi of Ga68 PSMA  Positron emission tomographic (PET) images from mid-thigh to skull  vertex were then acquired after a delay of approximately 60 minutes.  Also acquired was a contemporaneous noncontrast CT scan performed for  attenuation correction of PET images and anatomic localization.  The  PET and CT images were digitally fused for display.  All images were  acquired on a combined PET-CT scanner unit.  Some areas of PSMA  accumulation may be described in standardized " uptake value (SUV)  units.      Liver SUV: 6.3      FINDINGS:  NECK AND CHEST:  No abnormal focal Ga68 PSMA uptake. Note is made of mild  heterogeneous PSMA activity in the esophagus likely inflammatory in  nature. Physiologic activity is seen within the bilateral parotid and  submandibular glands.      ABDOMEN:  There is no abnormal Ga68 PSMA uptake within abdominal lymph nodes.  Intensely increased Ga68 PSMA uptake within the liver and the spleen,  which is physiologic. Physiologic activity is noted within bilateral  kidneys. Physiologic tracer excretion into the small bowel.      PELVIS:  Intensely focal increased Ga68 PSMA uptake seen in the left anterior  transitional zone region of the prostate gland with an SUV of 5.7. No  abnormal Ga68 PSMA uptake is seen within periprostatic and pelvic  lymph nodes.      MUSCULOSKELETAL:  There are no abnormal foci of increased Ga68 PSMA activity seen  throughout the axial and appendicular skeleton.      IMPRESSION:  Intensely focal increased ileum 68 PSMA activity in the left anterior  transitional zone of the prostate gland. Finding would be consistent  with an active neoplastic process.      No other definite areas of abnormal PSMA activity to suggest an  active neoplastic/metastatic process are identified..      I personally reviewed the image(s) / study and I agree with the  findings as stated. This study has been interpreted at [facility] in [place], [place].      MACRO:  None      Signed by: Hal Madrigal 12/11/2023 12:34 PM  Dictation workstation:   [id number]     GENOMICS:  NA    A/P: BCR after local definitive RT and ADT- bx proven local disease with negative PET    Salvage RT and ADT for 6 months was favored  Pt to review educational material for relugolix - will place order once we know pt will proceed with RT  Rationale, data, benefits and AEs discussed again; Also discussed treatment intensification  Will meet Rad Onc to  finalize plans- spacer to be organizd by them    Discussed importance of a healthier diet - offered to see Nutritional Services; Also discussed the importance of daily regular exercise (aerobic and resistance differences noted)  Offered to see Supportive Services if needed as well as integrative Oncology and Psychosocial Support    Darshan Hassan MD, FACP  Chief, Solid Tumor Oncology Division   Medical Oncology  Professor of Medicine and Urology  /[facility]  [facility]

## 2024-04-18 NOTE — TELEPHONE ENCOUNTER
Called pt. to remind of appointment on 4/26/2024 at 1:00 pm with    Pt answered and confirmed appointment .

## 2024-04-23 ENCOUNTER — TELEPHONE (OUTPATIENT)
Dept: PRIMARY CARE | Facility: HOSPITAL | Age: 74
End: 2024-04-23
Payer: MEDICARE

## 2024-04-23 DIAGNOSIS — I10 HYPERTENSION, UNSPECIFIED TYPE: ICD-10-CM

## 2024-04-23 RX ORDER — LOSARTAN POTASSIUM AND HYDROCHLOROTHIAZIDE 12.5; 1 MG/1; MG/1
1 TABLET ORAL DAILY
Qty: 30 TABLET | Refills: 11 | Status: SHIPPED | OUTPATIENT
Start: 2024-04-23 | End: 2024-06-03 | Stop reason: SDUPTHER

## 2024-04-26 ENCOUNTER — APPOINTMENT (OUTPATIENT)
Dept: RADIATION ONCOLOGY | Facility: HOSPITAL | Age: 74
End: 2024-04-26
Payer: MEDICARE

## 2024-05-09 ENCOUNTER — TELEPHONE (OUTPATIENT)
Dept: RADIATION ONCOLOGY | Facility: HOSPITAL | Age: 74
End: 2024-05-09
Payer: MEDICARE

## 2024-05-09 ENCOUNTER — ANESTHESIA EVENT (OUTPATIENT)
Dept: OPERATING ROOM | Facility: HOSPITAL | Age: 74
End: 2024-05-09
Payer: MEDICARE

## 2024-05-10 ENCOUNTER — APPOINTMENT (OUTPATIENT)
Dept: RADIATION ONCOLOGY | Facility: HOSPITAL | Age: 74
End: 2024-05-10
Payer: MEDICARE

## 2024-05-10 ENCOUNTER — ANESTHESIA (OUTPATIENT)
Dept: OPERATING ROOM | Facility: HOSPITAL | Age: 74
End: 2024-05-10
Payer: MEDICARE

## 2024-05-10 ENCOUNTER — HOSPITAL ENCOUNTER (OUTPATIENT)
Facility: HOSPITAL | Age: 74
Setting detail: OUTPATIENT SURGERY
Discharge: HOME | End: 2024-05-10
Attending: STUDENT IN AN ORGANIZED HEALTH CARE EDUCATION/TRAINING PROGRAM | Admitting: STUDENT IN AN ORGANIZED HEALTH CARE EDUCATION/TRAINING PROGRAM
Payer: MEDICARE

## 2024-05-10 VITALS
HEIGHT: 72 IN | BODY MASS INDEX: 29.29 KG/M2 | DIASTOLIC BLOOD PRESSURE: 92 MMHG | HEART RATE: 49 BPM | RESPIRATION RATE: 17 BRPM | WEIGHT: 216.27 LBS | SYSTOLIC BLOOD PRESSURE: 130 MMHG | OXYGEN SATURATION: 94 % | TEMPERATURE: 97.2 F

## 2024-05-10 DIAGNOSIS — C61 PROSTATE CANCER (MULTI): Primary | ICD-10-CM

## 2024-05-10 PROCEDURE — 3600000009 HC OR TIME - EACH INCREMENTAL 1 MINUTE - PROCEDURE LEVEL FOUR: Performed by: STUDENT IN AN ORGANIZED HEALTH CARE EDUCATION/TRAINING PROGRAM

## 2024-05-10 PROCEDURE — 7100000009 HC PHASE TWO TIME - INITIAL BASE CHARGE: Performed by: STUDENT IN AN ORGANIZED HEALTH CARE EDUCATION/TRAINING PROGRAM

## 2024-05-10 PROCEDURE — 2500000004 HC RX 250 GENERAL PHARMACY W/ HCPCS (ALT 636 FOR OP/ED): Performed by: ANESTHESIOLOGY

## 2024-05-10 PROCEDURE — 3600000004 HC OR TIME - INITIAL BASE CHARGE - PROCEDURE LEVEL FOUR: Performed by: STUDENT IN AN ORGANIZED HEALTH CARE EDUCATION/TRAINING PROGRAM

## 2024-05-10 PROCEDURE — 2500000004 HC RX 250 GENERAL PHARMACY W/ HCPCS (ALT 636 FOR OP/ED): Performed by: NURSE ANESTHETIST, CERTIFIED REGISTERED

## 2024-05-10 PROCEDURE — 2500000004 HC RX 250 GENERAL PHARMACY W/ HCPCS (ALT 636 FOR OP/ED): Performed by: STUDENT IN AN ORGANIZED HEALTH CARE EDUCATION/TRAINING PROGRAM

## 2024-05-10 PROCEDURE — A55874 PR TRANSPERINEAL PLACEMENT OF BIODEGRADABLE MATERIAL, PERI-PROSTATIC, SINGLE OR MULTIPLE: Performed by: ANESTHESIOLOGY

## 2024-05-10 PROCEDURE — 55876 PLACE RT DEVICE/MARKER PROS: CPT | Performed by: STUDENT IN AN ORGANIZED HEALTH CARE EDUCATION/TRAINING PROGRAM

## 2024-05-10 PROCEDURE — 7100000002 HC RECOVERY ROOM TIME - EACH INCREMENTAL 1 MINUTE: Performed by: STUDENT IN AN ORGANIZED HEALTH CARE EDUCATION/TRAINING PROGRAM

## 2024-05-10 PROCEDURE — 2720000007 HC OR 272 NO HCPCS: Performed by: STUDENT IN AN ORGANIZED HEALTH CARE EDUCATION/TRAINING PROGRAM

## 2024-05-10 PROCEDURE — 3700000001 HC GENERAL ANESTHESIA TIME - INITIAL BASE CHARGE: Performed by: STUDENT IN AN ORGANIZED HEALTH CARE EDUCATION/TRAINING PROGRAM

## 2024-05-10 PROCEDURE — 2500000005 HC RX 250 GENERAL PHARMACY W/O HCPCS: Performed by: NURSE ANESTHETIST, CERTIFIED REGISTERED

## 2024-05-10 PROCEDURE — A4216 STERILE WATER/SALINE, 10 ML: HCPCS | Performed by: STUDENT IN AN ORGANIZED HEALTH CARE EDUCATION/TRAINING PROGRAM

## 2024-05-10 PROCEDURE — 99100 ANES PT EXTEME AGE<1 YR&>70: CPT | Performed by: ANESTHESIOLOGY

## 2024-05-10 PROCEDURE — 55874 TPRNL PLMT BIODEGRDABL MATRL: CPT | Performed by: STUDENT IN AN ORGANIZED HEALTH CARE EDUCATION/TRAINING PROGRAM

## 2024-05-10 PROCEDURE — C1889 IMPLANT/INSERT DEVICE, NOC: HCPCS | Performed by: STUDENT IN AN ORGANIZED HEALTH CARE EDUCATION/TRAINING PROGRAM

## 2024-05-10 PROCEDURE — 2500000005 HC RX 250 GENERAL PHARMACY W/O HCPCS: Performed by: STUDENT IN AN ORGANIZED HEALTH CARE EDUCATION/TRAINING PROGRAM

## 2024-05-10 PROCEDURE — 7100000010 HC PHASE TWO TIME - EACH INCREMENTAL 1 MINUTE: Performed by: STUDENT IN AN ORGANIZED HEALTH CARE EDUCATION/TRAINING PROGRAM

## 2024-05-10 PROCEDURE — 7100000001 HC RECOVERY ROOM TIME - INITIAL BASE CHARGE: Performed by: STUDENT IN AN ORGANIZED HEALTH CARE EDUCATION/TRAINING PROGRAM

## 2024-05-10 PROCEDURE — A55874 PR TRANSPERINEAL PLACEMENT OF BIODEGRADABLE MATERIAL, PERI-PROSTATIC, SINGLE OR MULTIPLE: Performed by: NURSE ANESTHETIST, CERTIFIED REGISTERED

## 2024-05-10 PROCEDURE — 2500000001 HC RX 250 WO HCPCS SELF ADMINISTERED DRUGS (ALT 637 FOR MEDICARE OP): Performed by: ANESTHESIOLOGY

## 2024-05-10 PROCEDURE — 2780000003 HC OR 278 NO HCPCS: Performed by: STUDENT IN AN ORGANIZED HEALTH CARE EDUCATION/TRAINING PROGRAM

## 2024-05-10 PROCEDURE — 3700000002 HC GENERAL ANESTHESIA TIME - EACH INCREMENTAL 1 MINUTE: Performed by: STUDENT IN AN ORGANIZED HEALTH CARE EDUCATION/TRAINING PROGRAM

## 2024-05-10 DEVICE — SPACEOAR SYSTEMS
Type: IMPLANTABLE DEVICE | Site: PROSTATE | Status: FUNCTIONAL
Brand: SPACEOAR VUE™ SYSTEM - 10ML

## 2024-05-10 DEVICE — STERILE PLACEMENT NEEDLES (17GA ETW X 20CM) WITH BONE WAX AND (1.2 X 3MM) SOFT TISSUE GOLD MARKER [3]
Type: IMPLANTABLE DEVICE | Site: PROSTATE | Status: FUNCTIONAL
Brand: FIDUCIAL MARKER KIT

## 2024-05-10 RX ORDER — HYDRALAZINE HYDROCHLORIDE 20 MG/ML
5 INJECTION INTRAMUSCULAR; INTRAVENOUS EVERY 30 MIN PRN
Status: DISCONTINUED | OUTPATIENT
Start: 2024-05-10 | End: 2024-05-10 | Stop reason: HOSPADM

## 2024-05-10 RX ORDER — SODIUM CHLORIDE, SODIUM LACTATE, POTASSIUM CHLORIDE, CALCIUM CHLORIDE 600; 310; 30; 20 MG/100ML; MG/100ML; MG/100ML; MG/100ML
100 INJECTION, SOLUTION INTRAVENOUS CONTINUOUS
Status: DISCONTINUED | OUTPATIENT
Start: 2024-05-10 | End: 2024-05-10 | Stop reason: HOSPADM

## 2024-05-10 RX ORDER — OXYCODONE HYDROCHLORIDE 5 MG/1
5 TABLET ORAL EVERY 6 HOURS PRN
Qty: 2 TABLET | Refills: 0 | Status: SHIPPED | OUTPATIENT
Start: 2024-05-10

## 2024-05-10 RX ORDER — ALBUTEROL SULFATE 0.83 MG/ML
2.5 SOLUTION RESPIRATORY (INHALATION) ONCE AS NEEDED
Status: DISCONTINUED | OUTPATIENT
Start: 2024-05-10 | End: 2024-05-10 | Stop reason: HOSPADM

## 2024-05-10 RX ORDER — SODIUM CHLORIDE 9 MG/ML
100 INJECTION, SOLUTION INTRAVENOUS CONTINUOUS
Status: DISCONTINUED | OUTPATIENT
Start: 2024-05-10 | End: 2024-05-10 | Stop reason: HOSPADM

## 2024-05-10 RX ORDER — CEFAZOLIN 1 G/1
INJECTION, POWDER, FOR SOLUTION INTRAVENOUS AS NEEDED
Status: DISCONTINUED | OUTPATIENT
Start: 2024-05-10 | End: 2024-05-10

## 2024-05-10 RX ORDER — PROPOFOL 10 MG/ML
INJECTION, EMULSION INTRAVENOUS CONTINUOUS PRN
Status: DISCONTINUED | OUTPATIENT
Start: 2024-05-10 | End: 2024-05-10

## 2024-05-10 RX ORDER — LIDOCAINE HYDROCHLORIDE 20 MG/ML
INJECTION, SOLUTION EPIDURAL; INFILTRATION; INTRACAUDAL; PERINEURAL AS NEEDED
Status: DISCONTINUED | OUTPATIENT
Start: 2024-05-10 | End: 2024-05-10

## 2024-05-10 RX ORDER — CEFAZOLIN SODIUM 2 G/100ML
2 INJECTION, SOLUTION INTRAVENOUS ONCE
Status: DISCONTINUED | OUTPATIENT
Start: 2024-05-10 | End: 2024-05-10 | Stop reason: HOSPADM

## 2024-05-10 RX ORDER — ONDANSETRON HYDROCHLORIDE 2 MG/ML
4 INJECTION, SOLUTION INTRAVENOUS ONCE AS NEEDED
Status: DISCONTINUED | OUTPATIENT
Start: 2024-05-10 | End: 2024-05-10 | Stop reason: HOSPADM

## 2024-05-10 RX ORDER — DIPHENHYDRAMINE HYDROCHLORIDE 50 MG/ML
12.5 INJECTION INTRAMUSCULAR; INTRAVENOUS ONCE AS NEEDED
Status: DISCONTINUED | OUTPATIENT
Start: 2024-05-10 | End: 2024-05-10 | Stop reason: HOSPADM

## 2024-05-10 RX ORDER — TRAMADOL HYDROCHLORIDE 50 MG/1
50 TABLET ORAL EVERY 8 HOURS PRN
Qty: 4 TABLET | Refills: 0 | Status: SHIPPED | OUTPATIENT
Start: 2024-05-10 | End: 2024-05-10 | Stop reason: HOSPADM

## 2024-05-10 RX ORDER — SODIUM CHLORIDE 9 MG/ML
INJECTION, SOLUTION INTRAMUSCULAR; INTRAVENOUS; SUBCUTANEOUS AS NEEDED
Status: DISCONTINUED | OUTPATIENT
Start: 2024-05-10 | End: 2024-05-10 | Stop reason: HOSPADM

## 2024-05-10 RX ORDER — ACETAMINOPHEN 325 MG/1
975 TABLET ORAL ONCE
Status: COMPLETED | OUTPATIENT
Start: 2024-05-10 | End: 2024-05-10

## 2024-05-10 RX ORDER — OXYCODONE HYDROCHLORIDE 5 MG/1
5 TABLET ORAL EVERY 4 HOURS PRN
Status: DISCONTINUED | OUTPATIENT
Start: 2024-05-10 | End: 2024-05-10 | Stop reason: HOSPADM

## 2024-05-10 RX ADMIN — PROPOFOL 100 MCG/KG/MIN: 10 INJECTION, EMULSION INTRAVENOUS at 10:20

## 2024-05-10 RX ADMIN — OXYCODONE HYDROCHLORIDE 5 MG: 5 TABLET ORAL at 11:09

## 2024-05-10 RX ADMIN — CEFAZOLIN 2 G: 1 INJECTION, POWDER, FOR SOLUTION INTRAMUSCULAR; INTRAVENOUS at 10:21

## 2024-05-10 RX ADMIN — PROPOFOL 30 MG: 10 INJECTION, EMULSION INTRAVENOUS at 10:23

## 2024-05-10 RX ADMIN — PROPOFOL 20 MG: 10 INJECTION, EMULSION INTRAVENOUS at 10:24

## 2024-05-10 RX ADMIN — ACETAMINOPHEN 975 MG: 325 TABLET ORAL at 09:16

## 2024-05-10 RX ADMIN — SODIUM CHLORIDE, POTASSIUM CHLORIDE, SODIUM LACTATE AND CALCIUM CHLORIDE 100 ML/HR: 600; 310; 30; 20 INJECTION, SOLUTION INTRAVENOUS at 09:17

## 2024-05-10 RX ADMIN — LIDOCAINE HYDROCHLORIDE 50 MG: 20 INJECTION, SOLUTION EPIDURAL; INFILTRATION; INTRACAUDAL; PERINEURAL at 10:20

## 2024-05-10 RX ADMIN — PROPOFOL 20 MG: 10 INJECTION, EMULSION INTRAVENOUS at 10:25

## 2024-05-10 RX ADMIN — PROPOFOL 30 MG: 10 INJECTION, EMULSION INTRAVENOUS at 10:21

## 2024-05-10 SDOH — HEALTH STABILITY: MENTAL HEALTH: CURRENT SMOKER: 0

## 2024-05-10 ASSESSMENT — PAIN SCALES - GENERAL
PAINLEVEL_OUTOF10: 5 - MODERATE PAIN
PAINLEVEL_OUTOF10: 3

## 2024-05-10 ASSESSMENT — PAIN - FUNCTIONAL ASSESSMENT
PAIN_FUNCTIONAL_ASSESSMENT: 0-10

## 2024-05-10 ASSESSMENT — COLUMBIA-SUICIDE SEVERITY RATING SCALE - C-SSRS
6. HAVE YOU EVER DONE ANYTHING, STARTED TO DO ANYTHING, OR PREPARED TO DO ANYTHING TO END YOUR LIFE?: NO
2. HAVE YOU ACTUALLY HAD ANY THOUGHTS OF KILLING YOURSELF?: NO
1. IN THE PAST MONTH, HAVE YOU WISHED YOU WERE DEAD OR WISHED YOU COULD GO TO SLEEP AND NOT WAKE UP?: NO

## 2024-05-10 NOTE — PERIOPERATIVE NURSING NOTE
1202 Assuming care of pt at this time. Bedside report received from outgoing RN. Dr Mitch willis for clarification of home going pain med, pt reporting intolerance to tramadol, requesting to have oxycodone sent to pharmacy. Awaiting response/.     1215 Pt assisted with dressing with help of family.     1231 Spoke with daughter via cell phone, instructed to come up from parking  to review discharge instructions due to pt having anesthesia     1240 Discharge instructions provided to pt and family. Educated on medications, effects of anesthesia, and homecoming care. Pt and family verbalizing understanding of all instructions provided at this time. All questions and concerns answered. Pt given contact information for provider. IV removed dressing applied. PT requesting to leave at this time and have doctor send RX to his pharmacy.     1249 Pt assisted to main lobby via  by transport. Dc in stable condition to home. All belongings taken with pt.

## 2024-05-10 NOTE — OP NOTE
Insertion Fiducial Marker Prostate ~ Space OAR Operative Note     Date: 5/10/2024  OR Location: [facility] OR    Name: Christiano Masters, : 1950, Age: 73 y.o., MRN: [id number], Sex: male    Diagnosis  Pre-op Diagnosis     * Prostate cancer (Multi) [C61] Post-op Diagnosis     * Prostate cancer (Multi) [C61]     Procedures  Insertion Fiducial Marker Prostate ~ Space OAR  11298 - LA PLMT INTERSTITIAL DEV RADIAT TX PROSTATE 1/MULT    LA TRANSPERINEAL PLMT BIODEGRADABLE MATRL 1/MLT NJX [37382]  Surgeons      * Dima Meyer - Primary    Resident/Fellow/Other Assistant:  Surgeons and Role:  * No surgeons found with a matching role *    Procedure Summary  Anesthesia: Monitor Anesthesia Care  ASA: III  Anesthesia Staff: Anesthesiologist: Naren Pacheco MD  CRNA: MARCIE Lu-CRNA  Estimated Blood Loss: 1mL  Intra-op Medications:   Administrations occurring from 1010 to 1110 on 05/10/24:   Medication Name Total Dose   BUPivacaine HCl (Marcaine) 0.5 % (5 mg/mL) 8 mL, lidocaine (Xylocaine) 8 mL, sodium bicarbonate 4 mEq syringe 20 mL   sodium chloride (PF) 0.9% solution 10 mL   lactated Ringer's infusion Cannot be calculated              Anesthesia Record               Intraprocedure I/O Totals          Intake    Propofol Drip 0.00 mL    The total shown is the total volume documented since Anesthesia Start was filed.    lactated Ringer's infusion 400.00 mL    Total Intake 400 mL          Specimen: No specimens collected     Staff:   Circulator: Verónica Pedersen RN; Jennifer Wakefield RN  Scrub Person: Ирина Zelaya         Drains and/or Catheters: * None in log *    Tourniquet Times:         Implants:  Implants       Type Name Action Serial No.      Implant GOLD MARKERS, 1.2MM, SOFT TISSUE, 20CM 17GA NEEDLES, 3-PK, STRL - [id number] Implanted       SYSTEM, SPACEOAR ALLAN, HYDROGEL - SN/A - [id number] Implanted N/A              Findings: successful gold markers implanted and spaceoar.      Indications: Christiano WALTER  Guerrero is an 73 y.o. male who is having surgery for Prostate cancer (Multi) [C61].     The patient was seen in the preoperative area. The risks, benefits, complications, treatment options, non-operative alternatives, expected recovery and outcomes were discussed with the patient. The possibilities of reaction to medication, pulmonary aspiration, injury to surrounding structures, bleeding, recurrent infection, the need for additional procedures, failure to diagnose a condition, and creating a complication requiring transfusion or operation were discussed with the patient. The patient concurred with the proposed plan, giving informed consent.  The site of surgery was properly noted/marked if necessary per policy. The patient has been actively warmed in preoperative area. Preoperative antibiotics given. Venous thrombosis prophylaxis are not indicated.    Procedure Details:   Patient was consented in the preoperative area. Questions were answered. Allergies were reviewed. Patient was brought to the OR and placed in supine position on the OR table. A timeout was performed, all were in agreement. Patient underwent MAC anesthesia without complication. He was repositioned in dorsal lithotomy.     Local was given and Prostate block was performed with Lidocaine 1% 8mL; Bupivacaine 0.5% 8mL; Sodium Bicarbonate 8.4% 4mL      Ultrasound probe was attached and secured onto stepper with a stand-off balloon on TRUS probe. The probe was inserted into the rectum. Ultrasound was aligned in the midline of the prostate in both the sagittal and axial planes. We then inserted fiducial markers, 3 in total, 2 in the left hemiprostate and one in the right. The probe position was adjusted to obtain ultrasound coupling with the anterior rectal wall. On the ultrasound image, we located the perirectal fat between the prostate and the rectum to determine needle position for hydrodissection. The needle was inserted approximately 1-2 cm above the  TRUS probe. Then, the needle was advanced through the rectourethralis muscle to the perirectal fat approximately mid-gland. The needle position was confirmed on both sagittal and axial views, and verified the needle tip was in the perirectal fat. Hydrodissection was used to separate the Denonvilliers' fascia and rectal wall and to verify proper needle placement at mid-gland prior to injecting the SpaceOAR Hydrogel. Then, we accessed axial ultrasound view to confirm correct needle position (mid-gland and centered) and aspirate to confirm the needle is not intravascular. We carefully disconnect the syringe from the 18G needle and attached the SpaceOAR to the 18G needle. SpaceOAR was dispensed using a smooth, continuous injection technique into the space between the Denonvilliers' fascia and rectum.  Pressure was held on the rectum until hemostasis was achieved. This concluded the procedure. Patient was awoken from anesthesia without complication and transferred to PACU in stable condition.     Complications:  None; patient tolerated the procedure well.    Disposition: PACU - hemodynamically stable.  Condition: stable         Attending Attestation: I was present and scrubbed for the entire procedure.    Dima Meyer  Phone Number: [phone]

## 2024-05-10 NOTE — ANESTHESIA PREPROCEDURE EVALUATION
Patient: Christiano Masters    Procedure Information       Date/Time: 05/10/24 1010    Procedure: Insertion Fiducial Marker Prostate ~ Space OAR    Location: [facility] A OR 01 / [facility] OR    Surgeons: Dima Meyer MD            Relevant Problems   Anesthesia (within normal limits)      Cardiac   (+) A-fib (Multi)   (+) Abnormal EKG   (+) Atherosclerotic heart disease of native coronary artery without angina pectoris   (+) Atrial fibrillation (Multi) (Resolved. Ablation 6 months ago.)   (+) Atrial flutter (Multi)   (+) Atypical chest pain   (+) Chronic atrial fibrillation, unspecified (Multi)   (+) Episodic atrial fibrillation (Multi)   (+) Hypercholesterolemia   (+) Hypertension   (+) Hypertension, uncontrolled   (+) Nonrheumatic mitral (valve) insufficiency   (+) Other persistent atrial fibrillation (Multi)   (+) Persistent atrial fibrillation (Multi)   (+) Unspecified atrial fibrillation (Multi)      Pulmonary   (+) JEFFREY (obstructive sleep apnea)   (+) Obstructive sleep apnea syndrome (No CPAP)      Neuro   (+) Anxiety   (+) Panic      GI   (+) Gastro-esophageal reflux disease without esophagitis (Denies)      /Renal   (+) Adenocarcinoma of prostate (Multi)   (+) Enlarged prostate with lower urinary tract symptoms (LUTS)   (+) Malignant neoplasm of prostate (Multi)   (+) Malignant neoplasm prostate (Multi)   (+) Prostate cancer (Multi)      Liver   (+) Hepatitis C, chronic (Multi)      Endocrine   (+) Drug-induced obesity   (+) Obesity, unspecified      Hematology   (+) Long term (current) use of anticoagulants      ID   (+) Acute upper respiratory infection   (+) Ear infection   (+) Hepatitis C, chronic (Multi)      Skin   (+) SCC (squamous cell carcinoma)       Clinical information reviewed:                   NPO Detail:  No data recorded     Physical Exam    Airway  Mallampati: II  TM distance: >3 FB  Neck ROM: full     Cardiovascular    Dental    Pulmonary    Abdominal            Anesthesia Plan    History  of general anesthesia?: yes  History of complications of general anesthesia?: no    ASA 3     MAC     The patient is not a current smoker.    intravenous induction   Anesthetic plan and risks discussed with patient.    Plan discussed with CRNA.

## 2024-05-10 NOTE — DISCHARGE INSTRUCTIONS
DEPARTMENT OF UROLOGY  DISCHARGE INSTRUCTIONS PROSTATE FIDUCIAL MARKER AND SPACE OAR PLACEMENT  Outpatient Surgery    C O N F I D E N T I A L   I N F O R M A T I O N          Call [phone] during regular daytime business hours (8:00 am - 5:00 pm) and after 5:00 pm and ask for the Urology Resident with any questions or concerns.      If it is a life-threatening situation, proceed to the nearest emergency department.        Follow-up appointment:  follow up only as needed     Thank you for the opportunity to care for you today.  Your health and healing are very important to us.  We hope we made you feel as comfortable as possible and are committed to your recovery and continued well-being.      The following is a brief overview of your prostate fiducial marker and Space OAR placedment today. Some of the information contained on this summary may be confidential.  This information should be kept in your records and should be shared with your regular doctor.    Physicians:   Dr. Meyer    Procedure performed: prostate biopsy  Pending results:   prostate pathology results    What to Expect During your Recovery and Home Care  Anesthesia Side Effects   You received anesthesia today.  You may feel sleepy, tired, or have a sore throat.   You may also feel drowsiness, dizziness, or inability to think clearly.  For your safety, do not drive, drink alcoholic beverages, take any unprescribed medication or make any important decisions for 24 hours.  A responsible adult should be with you for 24 hours.        Activity and Recovery    No heavy lifting or strenuous activity for several days. Avoid activities that put pressure on your rectal area. Do not have sex for a few days.      Pain Control  Unfortunately, you may experience pain after your procedure.  Adequate management can include alternative measures to help ease your pain and can include ice packs, and over the counter Tylenol can be taken as prescribed as needed for  breakthrough pain. Do not take more then 4,000mg of Tylenol in a 24-hour period.      Nausea/Vomiting   Clear liquids are best tolerated at first. Start slow, advance your diet as tolerated to normal foods. Avoid spicy, greasy, heavy foods at first. Also, you may feel nauseous or like you need to vomit if you take any type of medication on an empty stomach.  Call your physician if you are unable to eat or drink and have persistent vomiting.    Signs of Bleeding   Minor bleeding or drainage may occur from your rectum however, excessive or consistent bleeding should be reported to your surgeon. Excessive bleeding is defined as blood that is dripping from rectum, soaking through your pants, and is ketchup colored, thick with possible blood clots.  Consistent is defined as bleeding that does not stop. You may have blood in your poop, urine and semen for several weeks.     Treatment/wound care:   It is okay to shower 24 hours after time of surgery.      Signs of Infection  Signs of infection can include fever, burning sensation with urination, frequency, urgency or severe pain.  If you see any of these occur, please contact your doctor's office at [phone].  Any fever higher than 100.4, especially if associated with an ill feeling, abdominal pain, chills, or nausea should be reported to your surgeon.      Assist in bowel movements/urination  Increase fiber in diet  Urination should occur within 6 hours of anesthesia.   Increase water (6 to 8 glasses)  Increase walking   If you have tried these methods and your bladder still feels full and you cannot use the bathroom, please go to your nearest Emergency room.    Additional Instructions:   Try not to strain when going to the bathroom. Do not hold your urine. We will go over your biopsy results at your follow up appointment. It takes 7-10 business days for the results to come back.

## 2024-05-10 NOTE — ANESTHESIA POSTPROCEDURE EVALUATION
Patient: Christiano Masters    Procedure Summary       Date: 05/10/24 Room / Location: [facility] A OR 01 / Virtual U A OR    Anesthesia Start: 1014 Anesthesia Stop: 1036    Procedure: Insertion Fiducial Marker Prostate ~ Space OAR Diagnosis:       Prostate cancer (Multi)      (Prostate cancer (Multi) [C61])    Surgeons: Dima Meyer MD Responsible Provider: Naren Pacheco MD    Anesthesia Type: MAC ASA Status: 3            Anesthesia Type: MAC    Vitals Value Taken Time   /77 05/10/24 1038   Temp  05/10/24 1043   Pulse 71 05/10/24 1043   Resp  05/10/24 1043   SpO2 96 % 05/10/24 1043   Vitals shown include unfiled device data.    Anesthesia Post Evaluation    Patient location during evaluation: PACU  Patient participation: complete - patient participated  Level of consciousness: awake  Pain management: adequate  Multimodal analgesia pain management approach  Airway patency: patent  Cardiovascular status: acceptable  Respiratory status: acceptable  Hydration status: acceptable  Postoperative Nausea and Vomiting: none  Comments: Will continue to assess PONV status.        No notable events documented.

## 2024-05-16 ENCOUNTER — TELEPHONE (OUTPATIENT)
Dept: RADIATION ONCOLOGY | Facility: HOSPITAL | Age: 74
End: 2024-05-16
Payer: MEDICARE

## 2024-05-16 NOTE — TELEPHONE ENCOUNTER
Called pt. to remind of appointment on 5/21/2024 at 9:00 am with  Pt answered and confirmed appointment .

## 2024-05-20 NOTE — PROGRESS NOTES
Staff Physician: Trisha Narvaez MD PhD  Referring Physician: Yaneli Fagan APRN-CNP  Date of Service: 5/21/2024  Patient name: Christiano Masters   MRN: [id number]    RADIATION ONCOLOGY FOLLOW-UP NOTE    IDENTIFYING DATA:   Cancer Staging   Malignant neoplasm of prostate (Multi)  Staging form: Prostate, AJCC 8th Edition  - Clinical stage from 6/11/2014: Stage IIC (cT1c, cN0, cM0, PSA: 11, Grade Group: 4) - Signed by Trisha Narvaez MD PhD on 2/6/2024    Problem List Items Addressed This Visit       Malignant neoplasm of prostate (Multi) - Primary    Relevant Orders    Rad Onc Intent to Treat     DIAGNOSIS: 73 year-old gentleman with recurrent prostate cancer; history of high risk prostate adenocarcinoma (iPSA 11, Sal 4+4, cN0M0 by conventional scans) diagnosed in 06/2014, treated with 18m ADT (planned for 24m, stopped due to weight gain and hypertension) and definitive radiation to the prostate (79.2Gy in 44 fractions, completed 1/13/2015). Lost to follow up, lowest PSA was 0.26, with BCR in 02/2022 (PSA 3), PSA most recently 5.97 with PSMA PET/CT suggesting local prostate recurrence (left anterior TZ, SUV 5.5).      ONCOLOGIC HISTORY:    6/2014: Diagnosed with high risk prostate adenocarcinoma (iPSA 11, Sal 4+4, cN0M0 by conventional scans) diagnosed in 06/2014, treated with 18m ADT (planned for 24m, stopped due to weight gain and hypertension) and definitive radiation to the prostate (79.2Gy in 44 fractions, completed 1/13/2015). PSA julieta was 0.26.    2/2022: BCR, PSA 3.0     12/11/23: PSMA PET suggested local prostate recurrence (left anterior TZ, SUV 5.5)     3/8/24: Prostate Biopsy with Dr. Meyer noted Sal Grade 4+4=8, no mention of radiation treatment effect; overall 12/22 + cores (4/10 + ABBY, 2 cores total with GG4).     4/18/24: PSA 6.28 (testosterone 568)    5/10/24: SpaceOAR + fiducials by Dr. Meyer    HISTORY OF PRESENT ILLNESS:    Today, Mr. Christiano Masters is accompanied by .  Symptomatically,  "he reports IPSS of 16 with increase in urinary frequency and urgency. He is not consistently on flomax, only takes as-needed. Reports some perineal discomfort since spacer placement, constipation and thin-calibre stools. He recalls that the lack of libido and sexual function was the most distressing side-effect when he was on ADT.    PAST MEDICAL HISTORY:  Past Medical History:   Diagnosis Date    A-fib (Multi)     on Eliquis, pre surgery stop instructions and clearance in 2/9/24 \"letters\"    Acute hepatitis C     Genotype 1a, s/p Harvoni [id number]    Anxiety     ASCVD (arteriosclerotic cardiovascular disease)     Benign prostatic hyperplasia with lower urinary tract symptoms     Chronic pain disorder     back    CKD (chronic kidney disease)     CKD (chronic kidney disease)     Cluster headache, chronic     Elevated PSA 12/21/2023    5.97    GERD (gastroesophageal reflux disease)     Hepatitis B     + antibody    Hyperaldosteronism (Multi)     Hypercholesteremia     Hypertension     Mitral valve insufficiency     Monoclonal gammopathy     MGUS followed by Hem/Onc Dr. Ochoa    JEFFREY (obstructive sleep apnea)     Prostate cancer (Multi)     RT and ADT 5633-1058    Tobacco use disorder      PAST SURGICAL HISTORY:  Past Surgical History:   Procedure Laterality Date    CARDIOVERSION  12/2022    and Ablation for A Fib    COLONOSCOPY  08/26/2019    Colonoscopy    HERNIA REPAIR  11/15/2021    Hernia repair    PROSTATE BIOPSY  06/11/2014     ALLERGIES:  No Known Allergies  MEDICATIONS:    Current Outpatient Medications:     acetaminophen (Tylenol 8 HOUR) 650 mg ER tablet, Take 1 tablet (650 mg) by mouth every 8 hours if needed for mild pain (1 - 3). Do not crush, chew, or split., Disp: , Rfl:     amLODIPine (Norvasc) 10 mg tablet, Take 1 tablet (10 mg) by mouth once daily., Disp: 90 tablet, Rfl: 1    ascorbic acid (VITAMIN C ORAL), Vitamin C TABS 1 TABLET AS NEEDED FOR COLDS Quantity: 0 Refills: 0 Ordered: 18-Apr-2023 DO " Active, Disp: , Rfl:     cholecalciferol (Vitamin D-3) 50 MCG (2000 UT) tablet, Take 1 tablet (2,000 Units) by mouth once daily., Disp: , Rfl:     losartan-hydrochlorothiazide (Hyzaar) 100-12.5 mg tablet, Take 1 tablet by mouth once daily., Disp: 30 tablet, Rfl: 11    metoprolol tartrate (Lopressor) 25 mg tablet, Take 1 tablet (25 mg) by mouth 2 times a day., Disp: 180 tablet, Rfl: 3    pravastatin (Pravachol) 40 mg tablet, Take 1 tablet (40 mg) by mouth once daily at bedtime., Disp: 30 tablet, Rfl: 11    tamsulosin (Flomax) 0.4 mg 24 hr capsule, Take 2 capsules (0.8 mg) by mouth once daily., Disp: 180 capsule, Rfl: 2    oxyCODONE (Roxicodone) 5 mg immediate release tablet, Take 1 tablet (5 mg) by mouth every 6 hours if needed for severe pain (7 - 10) for up to 2 doses. (Patient not taking: Reported on 2024), Disp: 2 tablet, Rfl: 0   SOCIAL HISTORY:  Social History     Tobacco Use    Smoking status: Never    Smokeless tobacco: Never   Substance Use Topics    Alcohol use: Never     FAMILY HISTORY:  Family History   Family history unknown: Yes     REVIEW OF SYSTEMS:  Please refer to RN note.    PHYSICAL EXAMINATION:  /84   Pulse 55   Temp 36.5 °C (97.7 °F) (Temporal)   Resp 18   Ht 1.829 m (6')   Wt 98.4 kg (217 lb)   SpO2 94%   BMI 29.43 kg/m²   General: no acute distress, engaged in conversation. No jaundice.  HEENT: Normocephalic, atraumatic. Extraocular movements are intact.   Pulmonary: Breathing comfortably on room air, no respiratory distress    PERFORMANCE STATUS:  KPS/ECO, Fully active, able to carry on all pre-disease performed without restriction (ECOG equivalent 0)    LABORATORY AND IMAGING DATA:  Imaging: All imaging was personally reviewed and interpreted in clinic. Findings as per HPI and EMR.    Laboratory/Pathology:  All pertinent labs and pathology were personally reviewed and interpreted in clinic. Findings as per HPI and EMR.  Lab Results   Component Value Date    PSA 6.28  (H) 04/18/2024    PSA 5.97 (H) 12/21/2023    PSA 3.46 08/17/2023    PSA 2.84 05/22/2023    PSA 3.02 02/22/2023    PSA 1.93 07/07/2022    PSA 1.75 05/19/2022    PSA 1.52 12/20/2021    PSA 0.47 06/17/2021    PSA 0.24 12/15/2020     Lab Results   Component Value Date    TESTOSTERONE 568 04/18/2024    TESTOSTERONE 603 12/21/2023    TESTOSTERONE 480 06/15/2020           IMPRESSION:  73 year-old gentleman with recurrent high risk prostate cancer; PSA 4/18/24 was 6.28; biopsy on 3/8/24 showed Sal Grade 4+4=8 (GG4); overall 12/22 + cores (4/10 + ABBY, 2 cores total with GG4). PSMA PET on 12/11/23 showed local prostate recurrence. He was initially diagnosed with high risk prostate adenocarcinoma (iPSA 11, [place] 4+4, cN0M0 by conventional scans) diagnosed in 06/2014, treated with 18m ADT (planned for 24m, stopped due to weight gain and hypertension) and definitive radiation to the prostate (79.2Gy in 44 fractions, completed 1/13/2015). PSA julieta was 0.26; BCR in 2022 with PSA 3.     I discussed that his options include ADT alone, reirradiation alone, or ADT plus reirradiation. He declines salvage RP. He understands that maximum cancer control would be achieved with ADT + reRT. However, reRT alone can be used to improve local control and avoid ADT side effect.s We had a long discussion regarding reirradiation, including risks to bowel, rectum, bladder, urethra, and erectile dysfunction. Given his current urinary function (IPSS 16, bother of 4), would recommend optimizing urinary function first before considering reirradiation. He will start flomax 0.4mg daily. We will reassess in 2 weeks. He is willing to consider ADT if reRT is not possible.     PLAN:  -Flomax 0.4mg daily   -FUV with Dr. Meyer   -MARIETTA in 2 weeks    Trisha Narvaez MD PhD  , Radiation Oncology

## 2024-05-21 ENCOUNTER — HOSPITAL ENCOUNTER (OUTPATIENT)
Dept: RADIATION ONCOLOGY | Facility: HOSPITAL | Age: 74
Setting detail: RADIATION/ONCOLOGY SERIES
Discharge: HOME | End: 2024-05-21
Payer: MEDICARE

## 2024-05-21 VITALS
HEIGHT: 72 IN | OXYGEN SATURATION: 94 % | DIASTOLIC BLOOD PRESSURE: 84 MMHG | BODY MASS INDEX: 29.39 KG/M2 | WEIGHT: 217 LBS | RESPIRATION RATE: 18 BRPM | SYSTOLIC BLOOD PRESSURE: 138 MMHG | TEMPERATURE: 97.7 F | HEART RATE: 55 BPM

## 2024-05-21 DIAGNOSIS — C61 PROSTATE CANCER (MULTI): ICD-10-CM

## 2024-05-21 DIAGNOSIS — C61 MALIGNANT NEOPLASM OF PROSTATE (MULTI): Primary | ICD-10-CM

## 2024-05-21 DIAGNOSIS — C61 ADENOCARCINOMA OF PROSTATE (MULTI): ICD-10-CM

## 2024-05-21 PROCEDURE — 99213 OFFICE O/P EST LOW 20 MIN: CPT | Performed by: STUDENT IN AN ORGANIZED HEALTH CARE EDUCATION/TRAINING PROGRAM

## 2024-05-21 ASSESSMENT — ENCOUNTER SYMPTOMS
CARDIOVASCULAR NEGATIVE: 1
NEUROLOGICAL NEGATIVE: 1
FREQUENCY: 1
CONSTIPATION: 1
RESPIRATORY NEGATIVE: 1
HEMATOLOGIC/LYMPHATIC NEGATIVE: 1
MUSCULOSKELETAL NEGATIVE: 1
LOSS OF SENSATION IN FEET: 0
OCCASIONAL FEELINGS OF UNSTEADINESS: 0
EYES NEGATIVE: 1
DEPRESSION: 0
CONSTITUTIONAL NEGATIVE: 1
PSYCHIATRIC NEGATIVE: 1
DIFFICULTY URINATING: 1
ENDOCRINE NEGATIVE: 1

## 2024-05-21 ASSESSMENT — PAIN SCALES - GENERAL: PAINLEVEL: 0-NO PAIN

## 2024-05-21 NOTE — PROGRESS NOTES
Radiation Oncology Nursing Note    IPSS (International Prostate Symptom Score):   16 / 35, bother 4   - He is not experiencing urinary incontinence.   Flomax   - He is not experiencing dysuria, hematuria, flank pain.       Bowel function:    - Denies hematochezia or pain.               - pt constipated and had ribbon like bowel movements for about 1 week after biopsy and spacer/fids procedure.     Current Systemic Treatment:  No     Pain: The patient's current pain level was assessed.  They report currently having a pain of 0 out of 10.  They feel their pain is under control without the use of pain medications. Pt reports discomfort at perineum where they inserted needle for spaceOAR/biopsy procedure    Review of Systems:  Review of Systems   Constitutional: Negative.    HENT:  Negative.     Eyes: Negative.    Respiratory: Negative.     Cardiovascular: Negative.    Gastrointestinal:  Positive for constipation (occasional constipation with ribbon like bowel movements).   Endocrine: Negative.    Genitourinary:  Positive for difficulty urinating, frequency, nocturia and pelvic pain (discomfort at site of needle insertion for biopsy and spacer/fids placement).    Musculoskeletal: Negative.    Skin: Negative.    Neurological: Negative.    Hematological: Negative.    Psychiatric/Behavioral: Negative.

## 2024-05-24 ENCOUNTER — APPOINTMENT (OUTPATIENT)
Dept: RADIATION ONCOLOGY | Facility: HOSPITAL | Age: 74
End: 2024-05-24
Payer: MEDICARE

## 2024-06-03 DIAGNOSIS — I10 HYPERTENSION, UNSPECIFIED TYPE: ICD-10-CM

## 2024-06-03 RX ORDER — LOSARTAN POTASSIUM AND HYDROCHLOROTHIAZIDE 12.5; 1 MG/1; MG/1
1 TABLET ORAL DAILY
Qty: 30 TABLET | Refills: 0 | Status: SHIPPED | OUTPATIENT
Start: 2024-06-03 | End: 2024-07-03

## 2024-06-03 NOTE — TELEPHONE ENCOUNTER
Patient with hypokalemia. Approving, but needs appt for additional refills. May need adjustment of meds.

## 2024-06-04 ENCOUNTER — TELEPHONE (OUTPATIENT)
Dept: RADIATION ONCOLOGY | Facility: HOSPITAL | Age: 74
End: 2024-06-04

## 2024-06-04 DIAGNOSIS — C61 PROSTATE CANCER (MULTI): ICD-10-CM

## 2024-06-04 DIAGNOSIS — R39.9 LOWER URINARY TRACT SYMPTOMS (LUTS): Primary | ICD-10-CM

## 2024-06-04 NOTE — PROGRESS NOTES
Spoke with patient and he will be dropping off a urine sample tomorrow and completing a bladder scan to determine the cause of his urinary urgency. Ji is aware.

## 2024-06-04 NOTE — TELEPHONE ENCOUNTER
Called pt. to remind of appointment on 6/7/2024 at 9:00 am with  Pt answered and confirmed appointment .

## 2024-06-05 ENCOUNTER — LAB (OUTPATIENT)
Dept: LAB | Facility: LAB | Age: 74
End: 2024-06-05
Payer: MEDICARE

## 2024-06-05 DIAGNOSIS — R39.9 LOWER URINARY TRACT SYMPTOMS (LUTS): ICD-10-CM

## 2024-06-05 PROCEDURE — 87086 URINE CULTURE/COLONY COUNT: CPT

## 2024-06-06 LAB — BACTERIA UR CULT: NO GROWTH

## 2024-06-07 ENCOUNTER — HOSPITAL ENCOUNTER (OUTPATIENT)
Dept: RADIATION ONCOLOGY | Facility: HOSPITAL | Age: 74
Setting detail: RADIATION/ONCOLOGY SERIES
Discharge: HOME | End: 2024-06-07
Payer: MEDICARE

## 2024-06-07 VITALS
HEART RATE: 54 BPM | RESPIRATION RATE: 18 BRPM | TEMPERATURE: 96.8 F | DIASTOLIC BLOOD PRESSURE: 91 MMHG | OXYGEN SATURATION: 95 % | WEIGHT: 218.92 LBS | BODY MASS INDEX: 29.69 KG/M2 | SYSTOLIC BLOOD PRESSURE: 151 MMHG

## 2024-06-07 DIAGNOSIS — C61 MALIGNANT NEOPLASM OF PROSTATE (MULTI): Primary | ICD-10-CM

## 2024-06-07 PROCEDURE — 99214 OFFICE O/P EST MOD 30 MIN: CPT | Performed by: STUDENT IN AN ORGANIZED HEALTH CARE EDUCATION/TRAINING PROGRAM

## 2024-06-07 SDOH — ECONOMIC STABILITY: FOOD INSECURITY: WITHIN THE PAST 12 MONTHS, THE FOOD YOU BOUGHT JUST DIDN'T LAST AND YOU DIDN'T HAVE MONEY TO GET MORE.: NEVER TRUE

## 2024-06-07 SDOH — ECONOMIC STABILITY: FOOD INSECURITY: WITHIN THE PAST 12 MONTHS, YOU WORRIED THAT YOUR FOOD WOULD RUN OUT BEFORE YOU GOT MONEY TO BUY MORE.: NEVER TRUE

## 2024-06-07 ASSESSMENT — ENCOUNTER SYMPTOMS
DEPRESSION: 0
PSYCHIATRIC NEGATIVE: 1
CARDIOVASCULAR NEGATIVE: 1
OCCASIONAL FEELINGS OF UNSTEADINESS: 0
CONSTITUTIONAL NEGATIVE: 1
RESPIRATORY NEGATIVE: 1
HEMATOLOGIC/LYMPHATIC NEGATIVE: 1
MUSCULOSKELETAL NEGATIVE: 1
NEUROLOGICAL NEGATIVE: 1
GASTROINTESTINAL NEGATIVE: 1
LOSS OF SENSATION IN FEET: 0
EYES NEGATIVE: 1
ENDOCRINE NEGATIVE: 1

## 2024-06-07 ASSESSMENT — PATIENT HEALTH QUESTIONNAIRE - PHQ9
2. FEELING DOWN, DEPRESSED OR HOPELESS: NOT AT ALL
SUM OF ALL RESPONSES TO PHQ9 QUESTIONS 1 AND 2: 0
1. LITTLE INTEREST OR PLEASURE IN DOING THINGS: NOT AT ALL

## 2024-06-07 ASSESSMENT — PAIN SCALES - GENERAL: PAINLEVEL: 1

## 2024-06-07 NOTE — PROGRESS NOTES
Radiation Oncology Nursing Note    IPSS (International Prostate Symptom Score):   9-14 / 35, bother 2. Urinary sx ranged depending on whether it was during the day or at nighttime.    - He is not experiencing urinary incontinence.  Flomax    - He is experiencing dysuria, and not hematuria, flank pain.     Sexual function:   - Quality of erections during the last 4 weeks: 3 = Firm enough for masturbation and foreplay only  - Use of erectile dysfunction medications:  None    Bowel function:    - Denies hematochezia or pain.     Current Systemic Treatment:  No     Pain: The patient's current pain level was assessed.  They report currently having a pain of 0 out of 10.  They feel their pain is under control without the use of pain medications.    Review of Systems:  Review of Systems   Constitutional: Negative.    HENT:  Negative.     Eyes: Negative.    Respiratory: Negative.     Cardiovascular: Negative.    Gastrointestinal: Negative.    Endocrine: Negative.    Genitourinary:  Positive for nocturia (3 times per night) and pelvic pain (pain throughout the day in penis / perineal area, not always with urinating).    Musculoskeletal: Negative.    Skin: Negative.    Neurological: Negative.    Hematological: Negative.    Psychiatric/Behavioral: Negative.

## 2024-06-09 NOTE — PROGRESS NOTES
Staff Physician: Trisha Narvaez MD PhD  Date of Service: 6/7/2024  Patient name: Christiano Masters   MRN: [id number]    RADIATION ONCOLOGY FOLLOW UP NOTE    IDENTIFYING DATA:  DIAGNOSIS: Recurrent disease, local   Cancer Staging   Malignant neoplasm of prostate (Multi)  Staging form: Prostate, AJCC 8th Edition  - Clinical stage from 6/11/2014: Stage IIC (cT1c, cN0, cM0, PSA: 11, Grade Group: 4) - Signed by Trisha Narvaez MD PhD on 2/6/2024    DISEASE STATUS: Treatment pending work-up  Problem List Items Addressed This Visit       Malignant neoplasm of prostate (Multi) - Primary    Relevant Orders    MR prostate with abby boundaries     He was last seen in Radiation Oncology on 5/21/24 and returns today to discuss re-irradiation     Oncologic History:  6/2014: Diagnosed with high risk prostate adenocarcinoma (iPSA 11, [place] 4+4, cN0M0 by conventional scans) diagnosed in 06/2014, treated with 18m ADT (planned for 24m, stopped due to weight gain and hypertension) and definitive radiation to the prostate (79.2Gy in 44 fractions, completed 1/13/2015). PSA julieta was 0.26.     2/2022: BCR, PSA 3.0      12/11/23: PSMA PET suggested local prostate recurrence (left anterior TZ, SUV 5.5)      3/8/24: Prostate Biopsy with Dr. Meyer noted [place] Grade 4+4=8, no mention of radiation treatment effect; overall 12/22 + cores (4/10 + ABBY, 2 cores total with GG4).      4/18/24: PSA 6.28 (testosterone 568)     5/10/24: SpaceOAR + fiducials by Dr. Meyer    Interval History:  Today, he is here by himself and reports feeling well. He has been on flomax 0.4mg for the past 3 weeks and thinks there may be an improvement in urination. Perineal and rectal fullness that he felt previously after spacer insertion are still present, but improving.     A 10 point review of systems was reviewed with pertinent positives and negatives noted in HPI. All other systems have been reviewed and are negative.    PERFORMANCE STATUS:  Karnofsky Performance  Score/ECO, Able to carry on normal activity; minor signs or symptoms of disease (ECOG equivalent 0)    PHYSICAL EXAMINATION:  BP (!) 151/91   Pulse 54   Temp 36 °C (96.8 °F) (Skin)   Resp 18   Wt 99.3 kg (218 lb 14.7 oz)   SpO2 95%   BMI 29.69 kg/m²   Constitutional: well developed, no distress, alert & oriented, cooperative  Eyes: pupils equal round and reactive to light, extraocular movements intact  Respiratory: normal work of breathing    CTCAE (v5) ADVERSE EVENTS:  Toxicity Assessment          2024    09:00   Toxicity Assessment   Treatment Site Pelvis - male   Proctitis Grade 1   Rectal Pain Grade 0   Hematuria Grade 0   Urinary Incontinence Grade 0   Bladder Spasm Grade 1   Urinary Frequency Grade 1   Urinary Retention Grade 2       Flomax 0.4mg   Urinary Tract Pain Grade 0   Urinary Urgency Grade 1       DIAGNOSTIC REPORTS REVIEWED:  Imaging: All imaging was personally reviewed and interpreted in clinic. Findings as per interval history and EMR.  Laboratory/Pathology:  All pertinent labs and pathology were personally reviewed and interpreted in clinic. Findings as per interval history and EMR.  Lab Results   Component Value Date    PSA 6.28 (H) 2024    PSA 5.97 (H) 2023    PSA 3.46 2023    PSA 2.84 2023    PSA 3.02 2023    PSA 1.93 2022    PSA 1.75 2022    PSA 1.52 2021    PSA 0.47 2021    PSA 0.24 12/15/2020     Lab Results   Component Value Date    TESTOSTERONE 568 2024    TESTOSTERONE 603 2023    TESTOSTERONE 480 06/15/2020       IMPRESSION:  73 year-old gentleman with recurrent high risk prostate cancer; PSA 24 was 6.28; biopsy on 3/8/24 showed Sal Grade 4+4=8 (GG4); overall  + cores (4/10 + ABBY, 2 cores total with GG4). PSMA PET on 23 showed local prostate recurrence. He was initially diagnosed with high risk prostate adenocarcinoma (iPSA 11, Sal 4+4, cN0M0 by conventional scans) diagnosed in  06/2014, treated with 18m ADT (planned for 24m, stopped due to weight gain and hypertension) and definitive radiation to the prostate (79.2Gy in 44 fractions, completed 1/13/2015). PSA julieta was 0.26; BCR in 2022 with PSA 3.      Again, we discussed that maximum cancer control would be achieved with ADT + re-RT, although reRT alone can be used to improve local control and avoid ADT side effects. He recalls his main concern with ADT was loss of libido and erectile dysfunction. He understands risks of re-irradiation, especially injury to the bladder, urethra, and rectum. His urinary function has improved on Flomax. He prefers to proceed with re-RT and ADT, as opposed to intermittent ADT alone.     PLAN:  -continue Flomax  -scheduled [place] at [facility]  -plan for 5fx, re-irradiation, treat at [facility]  -ADT per Dr. Hassan    He knows to call with any questions or concerns in the interim.    Trisha Narvaez MD PhD  , Radiation Oncology

## 2024-06-13 DIAGNOSIS — C61 MALIGNANT NEOPLASM OF PROSTATE (MULTI): Primary | ICD-10-CM

## 2024-06-14 ENCOUNTER — SPECIALTY PHARMACY (OUTPATIENT)
Dept: PHARMACY | Facility: CLINIC | Age: 74
End: 2024-06-14

## 2024-06-14 DIAGNOSIS — C61 MALIGNANT NEOPLASM OF PROSTATE (MULTI): Primary | ICD-10-CM

## 2024-06-14 PROCEDURE — RXMED WILLOW AMBULATORY MEDICATION CHARGE

## 2024-06-14 NOTE — TELEPHONE ENCOUNTER
Spoke to patient today- he was not ready to scheduled his follow up, wanted a call back. TERI BARTHOLOMWE RN

## 2024-06-14 NOTE — PROGRESS NOTES
Specialty Pharmacy    [employer]    Coverage has been approved for this patient's medication from 03.15.24-06.14.25.     This patient has scheduled their medication delivery with  Specialty Pharmacy.      They should receive their medication Tuesday, 06.18.24.    Thank you,    Serene Collazo, [facility]  Pharmacy Support [facility]  Specialty Pharmacy  [address] [place] [address], [id number]  T: [phone]  F: [phone]  [email or web address]

## 2024-06-15 ENCOUNTER — PHARMACY VISIT (OUTPATIENT)
Dept: PHARMACY | Facility: CLINIC | Age: 74
End: 2024-06-15
Payer: MEDICARE

## 2024-06-21 ENCOUNTER — SPECIALTY PHARMACY (OUTPATIENT)
Dept: HEMATOLOGY/ONCOLOGY | Facility: HOSPITAL | Age: 74
End: 2024-06-21
Payer: MEDICARE

## 2024-06-21 DIAGNOSIS — C61 MALIGNANT NEOPLASM OF PROSTATE (MULTI): Primary | ICD-10-CM

## 2024-06-21 RX ORDER — GLUCOSAM/CHONDRO/HERB 149/HYAL 750-100 MG
1 TABLET ORAL DAILY
COMMUNITY

## 2024-06-21 RX ORDER — BISMUTH SUBSALICYLATE 262 MG
1 TABLET,CHEWABLE ORAL DAILY
COMMUNITY

## 2024-06-21 RX ORDER — MAGNESIUM 200 MG
1 TABLET ORAL DAILY
COMMUNITY

## 2024-06-21 NOTE — PROGRESS NOTES
ONCOLOGY CLINICAL PHARMACY NOTE     Subjective  Christiano Masters is a 73 y.o. male with recurrent high risk prostate cancer, called for education.        Objective  There were no vitals taken for this visit.  Lab Results   Component Value Date    WBC 3.7 (L) 02/29/2024    HGB 14.3 02/29/2024    HCT 44.4 02/29/2024    MCV 83 02/29/2024     02/29/2024      Lab Results   Component Value Date    GLUCOSE 126 (H) 04/18/2024    CALCIUM 9.2 04/18/2024     04/18/2024    K 3.2 (L) 04/18/2024    CO2 32 04/18/2024     04/18/2024    BUN 17 04/18/2024    CREATININE 1.10 04/18/2024     Lab Results   Component Value Date    ALT 14 04/18/2024    AST 18 04/18/2024     (H) 05/19/2022    ALKPHOS 69 04/18/2024    BILITOT 0.9 04/18/2024       Allergies  No Known Allergies    Medications  Were medications reconciled this encounter: Yes     Current Outpatient Medications:     acetaminophen (Tylenol 8 HOUR) 650 mg ER tablet, Take 1 tablet (650 mg) by mouth every 8 hours if needed for mild pain (1 - 3). Do not crush, chew, or split., Disp: , Rfl:     amLODIPine (Norvasc) 10 mg tablet, Take 1 tablet (10 mg) by mouth once daily., Disp: 90 tablet, Rfl: 1    ascorbic acid (VITAMIN C ORAL), Vitamin C TABS 1 TABLET AS NEEDED FOR COLDS Quantity: 0 Refills: 0 Ordered: 18-Apr-2023 DO Active, Disp: , Rfl:     calcium carbonate/vitamin D3 (CALCIUM 500 + D, D3, ORAL), Take 1 tablet by mouth once daily., Disp: , Rfl:     losartan-hydrochlorothiazide (Hyzaar) 100-12.5 mg tablet, Take 1 tablet by mouth once daily., Disp: 30 tablet, Rfl: 0    magnesium 200 mg tablet, Take 1 tablet (200 mg) by mouth once daily., Disp: , Rfl:     metoprolol tartrate (Lopressor) 25 mg tablet, Take 1 tablet (25 mg) by mouth 2 times a day., Disp: 180 tablet, Rfl: 3    multivitamin tablet, Take 1 tablet by mouth once daily., Disp: , Rfl:     omega 3-dha-epa-fish oil (Fish OiL) 1,000 mg (120 mg-180 mg) capsule, Take 1 capsule (1,000 mg) by mouth once  daily., Disp: , Rfl:     pravastatin (Pravachol) 40 mg tablet, Take 1 tablet (40 mg) by mouth once daily at bedtime., Disp: 30 tablet, Rfl: 11    relugolix (Orgovyx) 120 mg tablet, Take 3 tablets (360 mg total) by mouth once daily for 1 day, THEN 1 tablet (120 mg total) once daily for 27 days., Disp: 30 tablet, Rfl: 0    relugolix (Orgovyx) 120 mg tablet, Take 1 tablet (120 mg total) by mouth once daily.  Fill 2nd - Maintenance Dose - plan for 6 months of total treatment, Disp: 30 tablet, Rfl: 4    tamsulosin (Flomax) 0.4 mg 24 hr capsule, Take 2 capsules (0.8 mg) by mouth once daily. (Patient taking differently: Take 2 capsules (0.8 mg) by mouth once daily. PRN), Disp: 180 capsule, Rfl: 2    Assessment and Plan  Christiano Masters is a 73 y.o. male with recurrent high risk prostate cancer    Treatment Plan  Patient to receive Oral Agent w/ the following agents listed below:    Relugolix    Dose: 360 mg Loading Dose + 120 mg Maintenance Dose  Administration: Take 360 mg total by mouth on day 1, then 120 mg daily thereafter.  Administer at approximately the same time each day, with or without food. Swallow tablets whole; do not crush or chew.  Warnings: QTc prolongation, hypersensitivity, and embryo-fetal toxicity  Side Effects: Hot flush, glucose increased, triglycerides increased, musculoskeletal pain, hemoglobin decreased, alanine aminotransferase (ALT) increased, fatigue, aspartate aminotransferase (AST) increased, constipation, and diarrhea  Storage: Store at room temperature; do not store above 30°C (86°F). Dispense in original container. Keep container tightly closed after first opening.  DDIs Identified: No    Instructed patient to alert team and go to Emergency Department for fevers of at least 100.4 degrees F, or other changes in clinical status. All questions were answered and my contact information was provided to patient.    I attest that I have counseled the patient on the above information and answered  any questions that the patient asked during our time together.    Lul Camara, PharmD, CSP  Clinical Pharm Specialist -  Oncology  [facility]  Phone #: [phone]  Fax #: [phone]  Email: [email or web address]

## 2024-06-24 ENCOUNTER — APPOINTMENT (OUTPATIENT)
Dept: RADIOLOGY | Facility: HOSPITAL | Age: 74
End: 2024-06-24
Payer: MEDICARE

## 2024-06-24 ENCOUNTER — APPOINTMENT (OUTPATIENT)
Dept: RADIATION ONCOLOGY | Facility: HOSPITAL | Age: 74
End: 2024-06-24
Payer: MEDICARE

## 2024-06-24 ENCOUNTER — HOSPITAL ENCOUNTER (OUTPATIENT)
Dept: RADIOLOGY | Facility: EXTERNAL LOCATION | Age: 74
Discharge: HOME | End: 2024-06-24
Payer: MEDICARE

## 2024-06-24 DIAGNOSIS — C61 PROSTATE CANCER (MULTI): Primary | ICD-10-CM

## 2024-06-24 DIAGNOSIS — C61 PROSTATE CANCER (MULTI): ICD-10-CM

## 2024-06-26 ENCOUNTER — HOSPITAL ENCOUNTER (OUTPATIENT)
Dept: RADIATION ONCOLOGY | Facility: HOSPITAL | Age: 74
Setting detail: RADIATION/ONCOLOGY SERIES
Discharge: HOME | End: 2024-06-26
Payer: MEDICARE

## 2024-06-26 ENCOUNTER — HOSPITAL ENCOUNTER (OUTPATIENT)
Dept: RADIOLOGY | Facility: EXTERNAL LOCATION | Age: 74
Discharge: HOME | End: 2024-06-26

## 2024-06-26 DIAGNOSIS — C61 PROSTATE CANCER (MULTI): Primary | ICD-10-CM

## 2024-06-26 DIAGNOSIS — C61 PROSTATE CANCER (MULTI): ICD-10-CM

## 2024-06-26 PROCEDURE — 77290 THER RAD SIMULAJ FIELD CPLX: CPT | Performed by: STUDENT IN AN ORGANIZED HEALTH CARE EDUCATION/TRAINING PROGRAM

## 2024-06-26 PROCEDURE — 77334 RADIATION TREATMENT AID(S): CPT | Performed by: STUDENT IN AN ORGANIZED HEALTH CARE EDUCATION/TRAINING PROGRAM

## 2024-07-01 LAB
AP SUMMARY REPORT: NORMAL
SCAN RESULT: NORMAL

## 2024-07-02 ENCOUNTER — OFFICE VISIT (OUTPATIENT)
Dept: PRIMARY CARE | Facility: HOSPITAL | Age: 74
End: 2024-07-02
Payer: MEDICARE

## 2024-07-02 VITALS
OXYGEN SATURATION: 97 % | HEART RATE: 51 BPM | SYSTOLIC BLOOD PRESSURE: 138 MMHG | TEMPERATURE: 98.4 F | BODY MASS INDEX: 29.62 KG/M2 | HEIGHT: 72 IN | WEIGHT: 218.7 LBS | DIASTOLIC BLOOD PRESSURE: 85 MMHG

## 2024-07-02 DIAGNOSIS — I10 HYPERTENSION, UNSPECIFIED TYPE: ICD-10-CM

## 2024-07-02 DIAGNOSIS — R73.03 PREDIABETES: ICD-10-CM

## 2024-07-02 DIAGNOSIS — I48.91 ATRIAL FIBRILLATION, UNSPECIFIED TYPE (MULTI): ICD-10-CM

## 2024-07-02 DIAGNOSIS — G47.30 SLEEP APNEA, UNSPECIFIED TYPE: Primary | ICD-10-CM

## 2024-07-02 DIAGNOSIS — C61 PROSTATE CANCER (MULTI): ICD-10-CM

## 2024-07-02 DIAGNOSIS — E78.5 HYPERLIPIDEMIA, UNSPECIFIED HYPERLIPIDEMIA TYPE: ICD-10-CM

## 2024-07-02 LAB
CHOLEST SERPL-MCNC: 128 MG/DL (ref 0–199)
CHOLESTEROL/HDL RATIO: 3
EST. AVERAGE GLUCOSE BLD GHB EST-MCNC: 120 MG/DL
HBA1C MFR BLD: 5.8 %
HDLC SERPL-MCNC: 42.3 MG/DL
LDLC SERPL CALC-MCNC: 75 MG/DL
NON HDL CHOLESTEROL: 86 MG/DL (ref 0–149)
TRIGL SERPL-MCNC: 56 MG/DL (ref 0–149)
VLDL: 11 MG/DL (ref 0–40)

## 2024-07-02 PROCEDURE — 3075F SYST BP GE 130 - 139MM HG: CPT

## 2024-07-02 PROCEDURE — 1159F MED LIST DOCD IN RCRD: CPT

## 2024-07-02 PROCEDURE — 1036F TOBACCO NON-USER: CPT

## 2024-07-02 PROCEDURE — 1126F AMNT PAIN NOTED NONE PRSNT: CPT

## 2024-07-02 PROCEDURE — 99214 OFFICE O/P EST MOD 30 MIN: CPT | Mod: GC

## 2024-07-02 PROCEDURE — 83036 HEMOGLOBIN GLYCOSYLATED A1C: CPT

## 2024-07-02 PROCEDURE — 99214 OFFICE O/P EST MOD 30 MIN: CPT

## 2024-07-02 PROCEDURE — 3079F DIAST BP 80-89 MM HG: CPT

## 2024-07-02 PROCEDURE — 36415 COLL VENOUS BLD VENIPUNCTURE: CPT

## 2024-07-02 PROCEDURE — 80061 LIPID PANEL: CPT

## 2024-07-02 RX ORDER — METOPROLOL TARTRATE 25 MG/1
25 TABLET, FILM COATED ORAL 2 TIMES DAILY
Qty: 180 TABLET | Refills: 3 | Status: SHIPPED | OUTPATIENT
Start: 2024-07-02 | End: 2025-07-02

## 2024-07-02 ASSESSMENT — ENCOUNTER SYMPTOMS
FATIGUE: 0
RHINORRHEA: 0
BLOOD IN STOOL: 0
OCCASIONAL FEELINGS OF UNSTEADINESS: 0
NUMBNESS: 0
EYE PAIN: 0
UNEXPECTED WEIGHT CHANGE: 0
WHEEZING: 0
LOSS OF SENSATION IN FEET: 0
DIARRHEA: 0
VOMITING: 0
DEPRESSION: 0
DIZZINESS: 0
FLANK PAIN: 0
WEAKNESS: 0
COUGH: 0
BACK PAIN: 0
ABDOMINAL PAIN: 0
SHORTNESS OF BREATH: 1
JOINT SWELLING: 0
NAUSEA: 0
HEMATURIA: 0
NECK PAIN: 0
CHEST TIGHTNESS: 0
EYE ITCHING: 0
DIFFICULTY URINATING: 0
LIGHT-HEADEDNESS: 0
FEVER: 0
DYSURIA: 0
POLYDIPSIA: 0
EYE DISCHARGE: 0
CHILLS: 0
HEADACHES: 0
MYALGIAS: 0
FREQUENCY: 0
CONSTIPATION: 0
ABDOMINAL DISTENTION: 0
PALPITATIONS: 0

## 2024-07-02 ASSESSMENT — PAIN SCALES - GENERAL: PAINLEVEL: 0-NO PAIN

## 2024-07-02 NOTE — PROGRESS NOTES
I saw and evaluated the patient. I personally obtained the key and critical portions of the history and physical exam or was physically present for key and critical portions performed by the resident/fellow. I reviewed the resident/fellow's documentation and discussed the patient with the resident/fellow. I agree with the resident/fellow's medical decision making as documented in the note.    Sabine Clifford MD

## 2024-07-02 NOTE — PROGRESS NOTES
Chief complaint: follow up    HPI:  Mr. Masters is a 71 y/o gentleman w/ PMH of prostate cancer (s/p RT and ADT 2758-7499) but now suspected recurrence considering prostate Bx and radiation, AFib on Eliquis, HTN, and hx of Hep C (s/p Harvoni [2015 - 2016] with negative VL), anxiety, moderate JEFFREY, who presents today for follow up.    Last seen February 2024    Only complaint today is requesting disability placard. He has always had shortness of breath with walking long distance. He denies chest pain, orthopnea, PND, LE edema, palpitations, cough, wheezing. Dyspnea has been ongoing for 2-3 years. He has moderate sleep apnea but refuses to wear BiPAP.    He has also been having nocturia (3-4 times per night). Urology started him on Tamsulosin and he was told this hopefully improve with Prostate cancer treatment.    Medications:  Current Outpatient Medications   Medication Instructions    acetaminophen (TYLENOL 8 HOUR) 650 mg, oral, Every 8 hours PRN, Do not crush, chew, or split.    amLODIPine (NORVASC) 10 mg, oral, Daily    ascorbic acid (VITAMIN C ORAL) Vitamin C TABS 1 TABLET AS NEEDED FOR COLDS Quantity: 0 Refills: 0 Ordered: 18-Apr-2023 DO Active    calcium carbonate/vitamin D3 (CALCIUM 500 + D, D3, ORAL) 1 tablet, oral, Daily    losartan-hydrochlorothiazide (Hyzaar) 100-12.5 mg tablet 1 tablet, oral, Daily    magnesium 200 mg tablet 1 tablet, oral, Daily    metoprolol tartrate (LOPRESSOR) 25 mg, oral, 2 times daily    multivitamin tablet 1 tablet, oral, Daily    omega 3-dha-epa-fish oil (Fish OiL) 1,000 mg (120 mg-180 mg) capsule 1 capsule, oral, Daily    pravastatin (PRAVACHOL) 40 mg, oral, Nightly    relugolix (Orgovyx) 120 mg tablet Take 3 tablets (360 mg total) by mouth once daily for 1 day, THEN 1 tablet (120 mg total) once daily for 27 days.    relugolix (ORGOVYX) 120 mg, oral, Daily, Fill 2nd - Maintenance Dose - plan for 6 months of total treatment    tamsulosin (FLOMAX) 0.8 mg, oral, Daily  "      Allergies:  No Known Allergies    Past medical history:  Past Medical History:   Diagnosis Date    A-fib (Multi)     on Eliquis, pre surgery stop instructions and clearance in 2/9/24 \"letters\"    Acute hepatitis C     Genotype 1a, s/p Harvoni [id number]    Anxiety     ASCVD (arteriosclerotic cardiovascular disease)     Benign prostatic hyperplasia with lower urinary tract symptoms     Chronic pain disorder     back    CKD (chronic kidney disease)     CKD (chronic kidney disease)     Cluster headache, chronic     Elevated PSA 12/21/2023    5.97    GERD (gastroesophageal reflux disease)     Hepatitis B     + antibody    Hyperaldosteronism (Multi)     Hypercholesteremia     Hypertension     Mitral valve insufficiency     Monoclonal gammopathy     MGUS followed by Hem/Onc Dr. Don MURPHY (obstructive sleep apnea)     Prostate cancer (Multi)     RT and ADT 3297-5494    Tobacco use disorder        Surgical history:  Past Surgical History:   Procedure Laterality Date    CARDIOVERSION  12/2022    and Ablation for A Fib    COLONOSCOPY  08/26/2019    Colonoscopy    HERNIA REPAIR  11/15/2021    Hernia repair    PROSTATE BIOPSY  06/11/2014       Family history:  Family History   Family history unknown: Yes       Social history:   reports that he has quit smoking. His smoking use included cigarettes. He has never used smokeless tobacco. He reports that he does not drink alcohol and does not use drugs.    Health maintenance:  Health Maintenance   Topic Date Due    Medicare Annual Wellness Visit (AWV)  Never done    Derm Melanoma Skin Check  Never done    Hepatitis A Vaccines (1 of 2 - Risk 2-dose series) Never done    Zoster Vaccines (1 of 2) Never done    DTaP/Tdap/Td Vaccines (1 - Tdap) Never done    RSV Pregnant patients and/or  patients aged 60+ years (1 - 1-dose 60+ series) Never done    Hepatitis B Vaccines (1 of 3 - Risk 3-dose series) Never done    Abdominal Aortic Aneurysm (AAA) Screening  Never done    Diabetes: " Hemoglobin A1C  05/19/2023    Diabetes Screening  05/19/2023    COVID-19 Vaccine (5 - 2023-24 season) 11/02/2023    CKD: Urine Protein Screening  02/10/2024    Influenza Vaccine (1) 09/01/2024    Lipid Panel  07/10/2028    Colorectal Cancer Screening  03/08/2033    Pneumococcal Vaccine: 65+ Years  Completed    HIB Vaccines  Aged Out    IPV Vaccines  Aged Out    Meningococcal Vaccine  Aged Out    Rotavirus Vaccines  Aged Out    HPV Vaccines  Aged Out    Irritable Bowel Syndrome  Discontinued         Review of systems:  Review of Systems   Constitutional:  Negative for chills, fatigue, fever and unexpected weight change.   HENT:  Negative for ear pain, hearing loss, rhinorrhea and tinnitus.    Eyes:  Negative for pain, discharge, itching and visual disturbance.   Respiratory:  Positive for shortness of breath. Negative for cough, chest tightness and wheezing.    Cardiovascular:  Negative for chest pain, palpitations and leg swelling.   Gastrointestinal:  Negative for abdominal distention, abdominal pain, blood in stool, constipation, diarrhea, nausea and vomiting.   Endocrine: Negative for polydipsia and polyuria.   Genitourinary:  Negative for difficulty urinating, dysuria, flank pain, frequency and hematuria.   Musculoskeletal:  Negative for back pain, joint swelling, myalgias and neck pain.   Skin:  Negative for rash.   Neurological:  Negative for dizziness, syncope, weakness, light-headedness, numbness and headaches.        Vitals:  Vitals:    07/02/24 1051   BP: 138/85   Pulse: 51   Temp: 36.9 °C (98.4 °F)   SpO2: 97%       Physical exam:  Physical Exam  Constitutional:       General: He is not in acute distress.     Appearance: Normal appearance.   Eyes:      General: No scleral icterus.  Cardiovascular:      Rate and Rhythm: Normal rate and regular rhythm.   Pulmonary:      Effort: No respiratory distress.      Breath sounds: Normal breath sounds. No wheezing or rhonchi.   Abdominal:      General: There is no  "distension.      Palpations: Abdomen is soft.      Tenderness: There is no abdominal tenderness. There is no guarding.   Musculoskeletal:         General: No swelling or tenderness.      Comments: Gait normal, slow   Skin:     Findings: No bruising or rash.   Neurological:      Mental Status: He is alert.      Cranial Nerves: No cranial nerve deficit.      Motor: No weakness.         Labs:  Lab Results   Component Value Date    WBC 3.7 (L) 02/29/2024    HGB 14.3 02/29/2024    HCT 44.4 02/29/2024    MCV 83 02/29/2024     02/29/2024       Lab Results   Component Value Date    GLUCOSE 126 (H) 04/18/2024    CALCIUM 9.2 04/18/2024     04/18/2024    K 3.2 (L) 04/18/2024    CO2 32 04/18/2024     04/18/2024    BUN 17 04/18/2024    CREATININE 1.10 04/18/2024       Lab Results   Component Value Date    HGBA1C 5.8 (A) 05/19/2022        Lab Results   Component Value Date    CHOL 155 07/10/2023    CHOL 191 05/19/2022     Lab Results   Component Value Date    HDL 43.5 07/10/2023    HDL 27.5 (A) 05/19/2022     No results found for: \"LDLCALC\"  Lab Results   Component Value Date    TRIG 70 07/10/2023    TRIG 150 (H) 05/19/2022     No components found for: \"CHOLHDL\"    Imaging:  Rad Onc CT Sim Images    Result Date: 6/26/2024  These images are not reportable by radiology and will not be interpreted by  Radiologists.    Rad Onc CT Sim Images    Result Date: 6/24/2024  These images are not reportable by radiology and will not be interpreted by  Radiologists.      Assessment and plan:  Mr. Masters is a 71 y/o gentleman w/ PMH of prostate cancer (s/p RT and ADT 8425-6829) but now suspected recurrence considering prostate Bx and radiation, AFib on Eliquis, HTN, and hx of Hep C (s/p Harvoni [2015 - 2016] with negative VL), anxiety, moderate JEFFREY, who presents today for follow up.    #Dyspnea on Exertion  ::Echo 2022: moderate pulmonary valve regurg, EF 60-65%  ::Ddx: moderate JEFFREY vs deconditioning vs COPD vs cardiac " etiology  -Cardiac and pulm exams normal  -Likely from JEFFREY and deconditioning  -Can consider PFTs in future    #HTN  ::/83 today  -c/w amlodipine 10, losartan-HCTZ 100-12.5 prescribed  -c/w low sodium diet     #moderate JEFFREY  ::last sleep medicine visit was 09/2023  ::has nostril constriction preventing him from using his CPAP machine  -referral to sleep medicine active     #Malignant neoplasm of prostate  ::Recurrence this year      ::Clinical stage from 6/11/2014: Stage IIC (cT1c, cN0, cM0, PSA: 11, Grade Group: 4)  ::Dx 2014, s/p ADT and RT through 2015  ::PSA 6.2 4/2024  -Last saw rad onc 6/7, heme-onc 4/18  -continue Flomax  -plan for 5fx, re-irradiation  -ADT per Dr. Hassan       #Hx Afib  ::Follows with Dr. Vazquez, s/p DCCV (6/2022) and ablation (12/2022)  ::Saw Dr. Vazquez 3/7/2024  ::TTE (5/2022): LVEF 60-65%; Mild-mod TR, moderate CO  ::CHADsVASC 2 (age/HTN history)  -C/w Metoprolol tartrate 25mg BID  -Decided to stop Eliquis in March after risks/benefits discussion with Dr. Vazquez; this was prior to initiating ADT; CHADsVASC is still 2 but ADT increases risk of thromboembolic events; reaching out to Dr. Vazquez to see if it is still okay for patient to be off anticoagulation         #HLD  ::ASCVD risk score 25.6% in 10 years  -c/w Pravastatin dose 40mg at bedtime    #Prediabetes  - Last A1c 5.8 (5/2022)  - repeat A1c today     #Hx of MGUS  ::follows with Heme-Onc (Dr. Ochoa)  ::SPEP (11/4/22): +M-protein (0.4 g/dL); FLC ratio 2.25  ::Spot UPEP showed 4.6% of total urine protein Kappa FLC   ::Osseous survey negative   :: Negative SLiM CRAB criteria   -no active issue        #Labs today  A1c, lipid panel        HEALTH MAINTENANCE   Antibody Testing   HIV: negative 2015  Syphilis: negative 2015   Hepatitis C: negative 2015   Vaccines  Influenza: offer in fall  Shingles: denied >51yo 2 doses 2-6mo apart  Tdap: offer  Pneumonia: completed PCV 2019  COVID: recommend  Cancer screening   Colonoscopy:  last  March 2023 showed 2 polyps (path showed tubular adenoma) repeat in 2028  AAA screen: negative 2015  Low dose Chest CT: ordered age 50-80 20yr smoking hx current OR quit w/in last 15yrs yearly   Plan     Follow-up in 6 months.    Patient and plan discussed with attending physician MD [facility]

## 2024-07-02 NOTE — PATIENT INSTRUCTIONS
As discussed today, our plan is:     Labs - we collected labs today and will call you with any abnormal results. If you have any questions or concerns prior to us calling feel free to call our office to have your questions addressed.   Medication changes: none    Please come back to see us in: 6 months   ------  If you have any problems or questions, please contact the clinic at [phone] to leave a message. Our fax number is [phone]. If your issue cannot wait until the next business day, please go to urgent care or the emergency department.     I also strongly urge all of my patients to register for [facility] by going to: [email or web address]  (The  staff can also send you a text/email link to register when you check out).    No shows: It is understandable if you are unable to make it to a visit, but please cancel your appointment instead of not showing up. This helps to give other patients access to primary care and keeps wait times low.        [facility]   [phone]

## 2024-07-05 ENCOUNTER — TELEPHONE (OUTPATIENT)
Dept: INTERNAL MEDICINE | Facility: HOSPITAL | Age: 74
End: 2024-07-05
Payer: MEDICARE

## 2024-07-05 NOTE — TELEPHONE ENCOUNTER
Called patient and updated him on lab results.    LDL 75. Will c/w pravastatin    A1c 5.8. Talked about lifestyle modifications over the phone

## 2024-07-11 ENCOUNTER — SPECIALTY PHARMACY (OUTPATIENT)
Dept: PHARMACY | Facility: CLINIC | Age: 74
End: 2024-07-11

## 2024-07-11 PROCEDURE — RXMED WILLOW AMBULATORY MEDICATION CHARGE

## 2024-07-12 ENCOUNTER — OFFICE VISIT (OUTPATIENT)
Dept: HEMATOLOGY/ONCOLOGY | Facility: HOSPITAL | Age: 74
End: 2024-07-12
Payer: MEDICARE

## 2024-07-12 ENCOUNTER — PHARMACY VISIT (OUTPATIENT)
Dept: PHARMACY | Facility: CLINIC | Age: 74
End: 2024-07-12
Payer: MEDICARE

## 2024-07-12 VITALS
TEMPERATURE: 98.8 F | BODY MASS INDEX: 29.81 KG/M2 | DIASTOLIC BLOOD PRESSURE: 82 MMHG | WEIGHT: 219.8 LBS | HEART RATE: 50 BPM | OXYGEN SATURATION: 100 % | SYSTOLIC BLOOD PRESSURE: 143 MMHG | RESPIRATION RATE: 20 BRPM

## 2024-07-12 DIAGNOSIS — C61 MALIGNANT NEOPLASM OF PROSTATE (MULTI): ICD-10-CM

## 2024-07-12 PROCEDURE — 1125F AMNT PAIN NOTED PAIN PRSNT: CPT | Performed by: NURSE PRACTITIONER

## 2024-07-12 PROCEDURE — 99214 OFFICE O/P EST MOD 30 MIN: CPT | Performed by: NURSE PRACTITIONER

## 2024-07-12 PROCEDURE — 3077F SYST BP >= 140 MM HG: CPT | Performed by: NURSE PRACTITIONER

## 2024-07-12 PROCEDURE — 1159F MED LIST DOCD IN RCRD: CPT | Performed by: NURSE PRACTITIONER

## 2024-07-12 PROCEDURE — 3079F DIAST BP 80-89 MM HG: CPT | Performed by: NURSE PRACTITIONER

## 2024-07-12 PROCEDURE — 1036F TOBACCO NON-USER: CPT | Performed by: NURSE PRACTITIONER

## 2024-07-12 ASSESSMENT — PAIN SCALES - GENERAL: PAINLEVEL: 5

## 2024-07-12 NOTE — PROGRESS NOTES
Patient ID: Christiano Masters is a 73 y.o. male.  HPI: 74 yo AA male known to us with BCR after local definitive RT for HR disease (iPSA 11, [place] 4+4, cN0M0 by conventional scans) diagnosed in 06/2014, treated with 18m ADT (planned for 24m, stopped due to weight gain and hypertension) and definitive radiation to the prostate (79.2Gy in 44 fractions, completed 1/13/2015).   Lost to follow up, lowest PSA was 0.26, with BCR in 02/2022 (PSA 3), PSA most recently 5.97 in 12/23 with PSMA PET/CT suggesting local prostate recurrence (left anterior TZ, SUV 5.5).  PSADT currently ~10 months. He has chronic lower back pain.   MRI and TRUS/Bx done and path now c/w GS8 disease- both side of the gland  Discussed salvage options and he is interested in radiation- has many questions about AEs and despite conversations he has had with Rad Onc it does not sound he has captured the entire data  He is waiting to get spacer schedule and has an [place] with Rad Onc in fe weeks  Still reluctant to do ADT  4/18/24 patient met with Dr. Hassan and recommendation: Salvage RT and ADT for 6 months was favored.       Subjective    HPI  Seen in follow up for his prostate cancer.   He did not start relugolix yet.   Appetite is good.  Energy is sometimes a little low, but overall okay.   Denies cough/fever.   Denies n/v.   Bowels okay.  Denies dysuria/hematuria. Sometimes slow flow, but he is on flomax and he thinks that helps.     Objective    BSA: 2.25 meters squared  /82 (BP Location: Right arm, Patient Position: Sitting, BP Cuff Size: Adult)   Pulse 50   Temp 37.1 °C (98.8 °F) (Temporal)   Resp 20   Wt 99.7 kg (219 lb 12.8 oz)   SpO2 100%   BMI 29.81 kg/m²      Physical Exam  Vitals reviewed.   Constitutional:       General: He is not in acute distress.  Eyes:      General: No scleral icterus.  Cardiovascular:      Rate and Rhythm: Normal rate and regular rhythm.   Pulmonary:      Effort: Pulmonary effort is normal.      Breath  sounds: Normal breath sounds.   Abdominal:      General: Bowel sounds are normal. There is no distension.      Palpations: Abdomen is soft.      Tenderness: There is no abdominal tenderness. There is no guarding.   Musculoskeletal:      Right lower leg: No edema.      Left lower leg: No edema.   Skin:     General: Skin is warm and dry.   Neurological:      Mental Status: He is alert and oriented to person, place, and time.   Psychiatric:         Mood and Affect: Mood normal.         Behavior: Behavior normal.         Performance Status:  Symptomatic; fully ambulatory      Assessment/Plan   Labs today for new baseline.   Start relugolix. Reviewed common side effects and purpose for taking it. He previously received education from our pharmD.   Follow up in one month with labs.   Patient scheduled for MRI tomorrow. Patient asked if it is needed. I did confirm with Dr. Narvaez that it is needed since prior MRI was before spacer. Patient verbalized understanding.     Cancer Staging   Malignant neoplasm of prostate (Multi)  Staging form: Prostate, AJCC 8th Edition  - Clinical stage from 6/11/2014: Stage IIC (cT1c, cN0, cM0, PSA: 11, Grade Group: 4) - Signed by Trisha Narvaez MD PhD on 2/6/2024      Oncology History   Malignant neoplasm of prostate (Multi)   6/11/2014 Cancer Staged    Staging form: Prostate, AJCC 8th Edition, Clinical stage from 6/11/2014: Stage IIC (cT1c, cN0, cM0, PSA: 11, Grade Group: 4) - Signed by Trisha Narvaez MD PhD on 2/6/2024 2/6/2024 Initial Diagnosis    Malignant neoplasm of prostate (CMS/HCC)                   MARCIE Lopez-CNP

## 2024-07-13 ENCOUNTER — HOSPITAL ENCOUNTER (OUTPATIENT)
Dept: RADIOLOGY | Facility: HOSPITAL | Age: 74
Discharge: HOME | End: 2024-07-13
Payer: MEDICARE

## 2024-07-13 DIAGNOSIS — C61 MALIGNANT NEOPLASM OF PROSTATE (MULTI): ICD-10-CM

## 2024-07-13 PROCEDURE — A9575 INJ GADOTERATE MEGLUMI 0.1ML: HCPCS | Performed by: STUDENT IN AN ORGANIZED HEALTH CARE EDUCATION/TRAINING PROGRAM

## 2024-07-13 PROCEDURE — 2550000001 HC RX 255 CONTRASTS: Performed by: STUDENT IN AN ORGANIZED HEALTH CARE EDUCATION/TRAINING PROGRAM

## 2024-07-13 PROCEDURE — 72197 MRI PELVIS W/O & W/DYE: CPT

## 2024-07-13 RX ORDER — GADOTERATE MEGLUMINE 376.9 MG/ML
20 INJECTION INTRAVENOUS
Status: COMPLETED | OUTPATIENT
Start: 2024-07-13 | End: 2024-07-13

## 2024-07-13 RX ADMIN — GADOTERATE MEGLUMINE 20 ML: 376.9 INJECTION INTRAVENOUS at 09:25

## 2024-07-15 ENCOUNTER — TELEPHONE (OUTPATIENT)
Dept: HEMATOLOGY/ONCOLOGY | Facility: CLINIC | Age: 74
End: 2024-07-15
Payer: MEDICARE

## 2024-07-15 NOTE — TELEPHONE ENCOUNTER
Patient did not get lab work Friday. Asked him to get labs this week at any  lab (new baseline).   He also did not start relugolix yet. I encouraged him to start the pills/he does not need to wait for RT.   Reminded him I will see him again in a month with labs again.   He verbalized understanding.       7/19/24   2:22pm  Called patient. He said he will get labs today and start relugolix today. He said he didn't yet because he had some things come up.

## 2024-07-16 ENCOUNTER — APPOINTMENT (OUTPATIENT)
Dept: RADIATION ONCOLOGY | Facility: HOSPITAL | Age: 74
End: 2024-07-16
Payer: MEDICARE

## 2024-07-19 ENCOUNTER — LAB (OUTPATIENT)
Dept: LAB | Facility: LAB | Age: 74
End: 2024-07-19
Payer: MEDICARE

## 2024-07-19 DIAGNOSIS — C61 MALIGNANT NEOPLASM OF PROSTATE (MULTI): ICD-10-CM

## 2024-07-19 LAB
ALBUMIN SERPL BCP-MCNC: 4.3 G/DL (ref 3.4–5)
ALP SERPL-CCNC: 66 U/L (ref 33–136)
ALT SERPL W P-5'-P-CCNC: 21 U/L (ref 10–52)
ANION GAP SERPL CALC-SCNC: 14 MMOL/L (ref 10–20)
AST SERPL W P-5'-P-CCNC: 24 U/L (ref 9–39)
BASOPHILS # BLD AUTO: 0.05 X10*3/UL (ref 0–0.1)
BASOPHILS NFR BLD AUTO: 1.1 %
BILIRUB SERPL-MCNC: 0.9 MG/DL (ref 0–1.2)
BUN SERPL-MCNC: 14 MG/DL (ref 6–23)
CALCIUM SERPL-MCNC: 9.5 MG/DL (ref 8.6–10.6)
CHLORIDE SERPL-SCNC: 101 MMOL/L (ref 98–107)
CO2 SERPL-SCNC: 32 MMOL/L (ref 21–32)
CREAT SERPL-MCNC: 1.07 MG/DL (ref 0.5–1.3)
EGFRCR SERPLBLD CKD-EPI 2021: 73 ML/MIN/1.73M*2
EOSINOPHIL # BLD AUTO: 0.24 X10*3/UL (ref 0–0.4)
EOSINOPHIL NFR BLD AUTO: 5.1 %
ERYTHROCYTE [DISTWIDTH] IN BLOOD BY AUTOMATED COUNT: 14.4 % (ref 11.5–14.5)
GLUCOSE SERPL-MCNC: 85 MG/DL (ref 74–99)
HCT VFR BLD AUTO: 41.2 % (ref 41–52)
HGB BLD-MCNC: 13.4 G/DL (ref 13.5–17.5)
IMM GRANULOCYTES # BLD AUTO: 0.02 X10*3/UL (ref 0–0.5)
IMM GRANULOCYTES NFR BLD AUTO: 0.4 % (ref 0–0.9)
LYMPHOCYTES # BLD AUTO: 1.96 X10*3/UL (ref 0.8–3)
LYMPHOCYTES NFR BLD AUTO: 41.4 %
MCH RBC QN AUTO: 27.3 PG (ref 26–34)
MCHC RBC AUTO-ENTMCNC: 32.5 G/DL (ref 32–36)
MCV RBC AUTO: 84 FL (ref 80–100)
MONOCYTES # BLD AUTO: 0.55 X10*3/UL (ref 0.05–0.8)
MONOCYTES NFR BLD AUTO: 11.6 %
NEUTROPHILS # BLD AUTO: 1.92 X10*3/UL (ref 1.6–5.5)
NEUTROPHILS NFR BLD AUTO: 40.4 %
NRBC BLD-RTO: 0 /100 WBCS (ref 0–0)
PLATELET # BLD AUTO: 271 X10*3/UL (ref 150–450)
POTASSIUM SERPL-SCNC: 3.7 MMOL/L (ref 3.5–5.3)
PROT SERPL-MCNC: 7.2 G/DL (ref 6.4–8.2)
PSA SERPL-MCNC: 6.65 NG/ML
RBC # BLD AUTO: 4.9 X10*6/UL (ref 4.5–5.9)
SODIUM SERPL-SCNC: 143 MMOL/L (ref 136–145)
TESTOST SERPL-MCNC: 450 NG/DL (ref 240–1000)
WBC # BLD AUTO: 4.7 X10*3/UL (ref 4.4–11.3)

## 2024-07-19 PROCEDURE — 36415 COLL VENOUS BLD VENIPUNCTURE: CPT

## 2024-07-19 PROCEDURE — 85025 COMPLETE CBC W/AUTO DIFF WBC: CPT

## 2024-07-19 PROCEDURE — 84403 ASSAY OF TOTAL TESTOSTERONE: CPT

## 2024-07-19 PROCEDURE — 84153 ASSAY OF PSA TOTAL: CPT

## 2024-07-19 PROCEDURE — 80053 COMPREHEN METABOLIC PANEL: CPT

## 2024-07-22 ENCOUNTER — HOSPITAL ENCOUNTER (OUTPATIENT)
Dept: RADIATION ONCOLOGY | Facility: HOSPITAL | Age: 74
Setting detail: RADIATION/ONCOLOGY SERIES
Discharge: HOME | End: 2024-07-22
Payer: MEDICARE

## 2024-07-22 ENCOUNTER — TELEPHONE (OUTPATIENT)
Dept: RADIATION ONCOLOGY | Facility: HOSPITAL | Age: 74
End: 2024-07-22
Payer: MEDICARE

## 2024-07-22 PROCEDURE — 77300 RADIATION THERAPY DOSE PLAN: CPT | Performed by: STUDENT IN AN ORGANIZED HEALTH CARE EDUCATION/TRAINING PROGRAM

## 2024-07-22 PROCEDURE — 77334 RADIATION TREATMENT AID(S): CPT | Performed by: STUDENT IN AN ORGANIZED HEALTH CARE EDUCATION/TRAINING PROGRAM

## 2024-07-22 PROCEDURE — 77295 3-D RADIOTHERAPY PLAN: CPT | Performed by: STUDENT IN AN ORGANIZED HEALTH CARE EDUCATION/TRAINING PROGRAM

## 2024-07-26 ENCOUNTER — HOSPITAL ENCOUNTER (OUTPATIENT)
Dept: RADIATION ONCOLOGY | Facility: HOSPITAL | Age: 74
Setting detail: RADIATION/ONCOLOGY SERIES
Discharge: HOME | End: 2024-07-26
Payer: MEDICARE

## 2024-07-26 DIAGNOSIS — C61 MALIGNANT NEOPLASM OF PROSTATE (MULTI): ICD-10-CM

## 2024-07-26 DIAGNOSIS — Z51.0 ENCOUNTER FOR ANTINEOPLASTIC RADIATION THERAPY: ICD-10-CM

## 2024-07-26 LAB
RAD ONC MSQ ACTUAL FRACTIONS DELIVERED: 1
RAD ONC MSQ ACTUAL SESSION DELIVERED DOSE: 725 CGRAY
RAD ONC MSQ ACTUAL TOTAL DOSE: 725 CGRAY
RAD ONC MSQ ELAPSED DAYS: 0
RAD ONC MSQ LAST DATE: NORMAL
RAD ONC MSQ PRESCRIBED FRACTIONAL DOSE: 725 CGRAY
RAD ONC MSQ PRESCRIBED NUMBER OF FRACTIONS: 5
RAD ONC MSQ PRESCRIBED TECHNIQUE: NORMAL
RAD ONC MSQ PRESCRIBED TOTAL DOSE: 3625 CGRAY
RAD ONC MSQ PRESCRIPTION PATTERN COMMENT: NORMAL
RAD ONC MSQ START DATE: NORMAL
RAD ONC MSQ TREATMENT COURSE NUMBER: 2
RAD ONC MSQ TREATMENT SITE: NORMAL

## 2024-07-26 PROCEDURE — 77280 THER RAD SIMULAJ FIELD SMPL: CPT | Performed by: RADIOLOGY

## 2024-07-26 PROCEDURE — 77373 STRTCTC BDY RAD THER TX DLVR: CPT | Performed by: RADIOLOGY

## 2024-08-02 ENCOUNTER — HOSPITAL ENCOUNTER (OUTPATIENT)
Dept: RADIATION ONCOLOGY | Facility: HOSPITAL | Age: 74
Setting detail: RADIATION/ONCOLOGY SERIES
Discharge: HOME | End: 2024-08-02
Payer: MEDICARE

## 2024-08-02 DIAGNOSIS — C61 MALIGNANT NEOPLASM OF PROSTATE (MULTI): ICD-10-CM

## 2024-08-02 DIAGNOSIS — Z51.0 ENCOUNTER FOR ANTINEOPLASTIC RADIATION THERAPY: ICD-10-CM

## 2024-08-02 LAB
RAD ONC MSQ ACTUAL FRACTIONS DELIVERED: 2
RAD ONC MSQ ACTUAL SESSION DELIVERED DOSE: 725 CGRAY
RAD ONC MSQ ACTUAL TOTAL DOSE: 1450 CGRAY
RAD ONC MSQ ELAPSED DAYS: 7
RAD ONC MSQ LAST DATE: NORMAL
RAD ONC MSQ PRESCRIBED FRACTIONAL DOSE: 725 CGRAY
RAD ONC MSQ PRESCRIBED NUMBER OF FRACTIONS: 5
RAD ONC MSQ PRESCRIBED TECHNIQUE: NORMAL
RAD ONC MSQ PRESCRIBED TOTAL DOSE: 3625 CGRAY
RAD ONC MSQ PRESCRIPTION PATTERN COMMENT: NORMAL
RAD ONC MSQ START DATE: NORMAL
RAD ONC MSQ TREATMENT COURSE NUMBER: 2
RAD ONC MSQ TREATMENT SITE: NORMAL

## 2024-08-02 PROCEDURE — 77373 STRTCTC BDY RAD THER TX DLVR: CPT | Performed by: RADIOLOGY

## 2024-08-07 ENCOUNTER — SPECIALTY PHARMACY (OUTPATIENT)
Dept: PHARMACY | Facility: CLINIC | Age: 74
End: 2024-08-07

## 2024-08-07 PROCEDURE — RXMED WILLOW AMBULATORY MEDICATION CHARGE

## 2024-08-08 ENCOUNTER — PHARMACY VISIT (OUTPATIENT)
Dept: PHARMACY | Facility: CLINIC | Age: 74
End: 2024-08-08
Payer: MEDICARE

## 2024-08-09 ENCOUNTER — HOSPITAL ENCOUNTER (OUTPATIENT)
Dept: RADIATION ONCOLOGY | Facility: HOSPITAL | Age: 74
Setting detail: RADIATION/ONCOLOGY SERIES
Discharge: HOME | End: 2024-08-09
Payer: MEDICARE

## 2024-08-09 DIAGNOSIS — Z51.0 ENCOUNTER FOR ANTINEOPLASTIC RADIATION THERAPY: ICD-10-CM

## 2024-08-09 DIAGNOSIS — C61 MALIGNANT NEOPLASM OF PROSTATE (MULTI): ICD-10-CM

## 2024-08-09 LAB
RAD ONC MSQ ACTUAL FRACTIONS DELIVERED: 3
RAD ONC MSQ ACTUAL SESSION DELIVERED DOSE: 725 CGRAY
RAD ONC MSQ ACTUAL TOTAL DOSE: 2175 CGRAY
RAD ONC MSQ ELAPSED DAYS: 14
RAD ONC MSQ LAST DATE: NORMAL
RAD ONC MSQ PRESCRIBED FRACTIONAL DOSE: 725 CGRAY
RAD ONC MSQ PRESCRIBED NUMBER OF FRACTIONS: 5
RAD ONC MSQ PRESCRIBED TECHNIQUE: NORMAL
RAD ONC MSQ PRESCRIBED TOTAL DOSE: 3625 CGRAY
RAD ONC MSQ PRESCRIPTION PATTERN COMMENT: NORMAL
RAD ONC MSQ START DATE: NORMAL
RAD ONC MSQ TREATMENT COURSE NUMBER: 2
RAD ONC MSQ TREATMENT SITE: NORMAL

## 2024-08-09 PROCEDURE — 77373 STRTCTC BDY RAD THER TX DLVR: CPT | Performed by: RADIOLOGY

## 2024-08-12 ENCOUNTER — APPOINTMENT (OUTPATIENT)
Dept: HEMATOLOGY/ONCOLOGY | Facility: CLINIC | Age: 74
End: 2024-08-12
Payer: MEDICARE

## 2024-08-16 ENCOUNTER — HOSPITAL ENCOUNTER (OUTPATIENT)
Dept: RADIATION ONCOLOGY | Facility: HOSPITAL | Age: 74
Setting detail: RADIATION/ONCOLOGY SERIES
Discharge: HOME | End: 2024-08-16
Payer: MEDICARE

## 2024-08-16 ENCOUNTER — TELEPHONE (OUTPATIENT)
Dept: HEMATOLOGY/ONCOLOGY | Facility: HOSPITAL | Age: 74
End: 2024-08-16
Payer: MEDICARE

## 2024-08-16 DIAGNOSIS — C61 MALIGNANT NEOPLASM OF PROSTATE (MULTI): ICD-10-CM

## 2024-08-16 DIAGNOSIS — Z51.0 ENCOUNTER FOR ANTINEOPLASTIC RADIATION THERAPY: ICD-10-CM

## 2024-08-16 LAB
RAD ONC MSQ ACTUAL FRACTIONS DELIVERED: 4
RAD ONC MSQ ACTUAL SESSION DELIVERED DOSE: 725 CGRAY
RAD ONC MSQ ACTUAL TOTAL DOSE: 2900 CGRAY
RAD ONC MSQ ELAPSED DAYS: 21
RAD ONC MSQ LAST DATE: NORMAL
RAD ONC MSQ PRESCRIBED FRACTIONAL DOSE: 725 CGRAY
RAD ONC MSQ PRESCRIBED NUMBER OF FRACTIONS: 5
RAD ONC MSQ PRESCRIBED TECHNIQUE: NORMAL
RAD ONC MSQ PRESCRIBED TOTAL DOSE: 3625 CGRAY
RAD ONC MSQ PRESCRIPTION PATTERN COMMENT: NORMAL
RAD ONC MSQ START DATE: NORMAL
RAD ONC MSQ TREATMENT COURSE NUMBER: 2
RAD ONC MSQ TREATMENT SITE: NORMAL

## 2024-08-16 PROCEDURE — 77373 STRTCTC BDY RAD THER TX DLVR: CPT | Performed by: RADIOLOGY

## 2024-08-16 NOTE — TELEPHONE ENCOUNTER
Called to check on patient since he did not show to his 10:30 appt with me today. He said he needs to reschedule because he has RT at 1pm.     He accepted 12:30 appt with me on 8/23. I reminded him to get labs prior.     He asked what the appt is for and I reminded him it is to check to see how he is tolerated relogolix. He verbalized understanding.

## 2024-08-23 ENCOUNTER — HOSPITAL ENCOUNTER (OUTPATIENT)
Dept: RADIATION ONCOLOGY | Facility: HOSPITAL | Age: 74
Setting detail: RADIATION/ONCOLOGY SERIES
Discharge: HOME | End: 2024-08-23
Payer: MEDICARE

## 2024-08-23 ENCOUNTER — APPOINTMENT (OUTPATIENT)
Dept: HEMATOLOGY/ONCOLOGY | Facility: HOSPITAL | Age: 74
End: 2024-08-23
Payer: MEDICARE

## 2024-08-23 ENCOUNTER — DOCUMENTATION (OUTPATIENT)
Dept: RADIATION ONCOLOGY | Facility: HOSPITAL | Age: 74
End: 2024-08-23

## 2024-08-23 DIAGNOSIS — C61 PROSTATE CANCER (MULTI): Primary | ICD-10-CM

## 2024-08-23 DIAGNOSIS — C61 MALIGNANT NEOPLASM OF PROSTATE (MULTI): ICD-10-CM

## 2024-08-23 DIAGNOSIS — C61 MALIGNANT NEOPLASM OF PROSTATE (MULTI): Primary | ICD-10-CM

## 2024-08-23 DIAGNOSIS — Z51.0 ENCOUNTER FOR ANTINEOPLASTIC RADIATION THERAPY: ICD-10-CM

## 2024-08-23 LAB
RAD ONC MSQ ACTUAL FRACTIONS DELIVERED: 5
RAD ONC MSQ ACTUAL SESSION DELIVERED DOSE: 725 CGRAY
RAD ONC MSQ ACTUAL TOTAL DOSE: 3625 CGRAY
RAD ONC MSQ ELAPSED DAYS: 28
RAD ONC MSQ LAST DATE: NORMAL
RAD ONC MSQ PRESCRIBED FRACTIONAL DOSE: 725 CGRAY
RAD ONC MSQ PRESCRIBED NUMBER OF FRACTIONS: 5
RAD ONC MSQ PRESCRIBED TECHNIQUE: NORMAL
RAD ONC MSQ PRESCRIBED TOTAL DOSE: 3625 CGRAY
RAD ONC MSQ PRESCRIPTION PATTERN COMMENT: NORMAL
RAD ONC MSQ START DATE: NORMAL
RAD ONC MSQ TREATMENT COURSE NUMBER: 2
RAD ONC MSQ TREATMENT SITE: NORMAL

## 2024-08-23 PROCEDURE — 77336 RADIATION PHYSICS CONSULT: CPT | Performed by: STUDENT IN AN ORGANIZED HEALTH CARE EDUCATION/TRAINING PROGRAM

## 2024-08-23 PROCEDURE — 77373 STRTCTC BDY RAD THER TX DLVR: CPT | Performed by: STUDENT IN AN ORGANIZED HEALTH CARE EDUCATION/TRAINING PROGRAM

## 2024-08-23 NOTE — PROGRESS NOTES
Called patient since he did not show to 12:30 appt with me today. He did not get labs either.   Sounds like he forgot.   He agreed to see me on 8/29 at 10am and he was reminded again to get labs. (This is a tox check since he started relugolix)  He said his last RT is today.

## 2024-08-23 NOTE — PROGRESS NOTES
Patient ID: Christiano Masters is a 73 y.o. male.  BCR after local definitive RT for HR disease (iPSA 11, [place] 4+4, cN0M0 by conventional scans) diagnosed in 06/2014, treated with 18m ADT (planned for 24m, stopped due to weight gain and hypertension) and definitive radiation to the prostate (79.2Gy in 44 fractions, completed 1/13/2015).   Lost to follow up, lowest PSA was 0.26, with BCR in 02/2022 (PSA 3), PSA most recently 5.97 in 12/23 with PSMA PET/CT suggesting local prostate recurrence (left anterior TZ, SUV 5.5).  PSADT currently ~10 months. He has chronic lower back pain.   MRI and TRUS/Bx done and path now c/w GS8 disease- both side of the gland  Discussed salvage options and he is interested in radiation- has many questions about AEs and despite conversations he has had with Rad Onc it does not sound he has captured the entire data  He is waiting to get spacer schedule and has an [place] with Rad Onc in fe weeks  Still reluctant to do ADT  4/18/24 patient met with Dr. Hassan and recommendation: Salvage RT and ADT for 6 months was favored.   7/19/24 started relugolix?  7/25/24 RT started ?           Subjective    HPI      Objective    BSA: There is no height or weight on file to calculate BSA.  There were no vitals taken for this visit.     Physical Exam    Performance Status:  {ECOG performance status:58794}      Assessment/Plan       Cancer Staging   Malignant neoplasm of prostate (Multi)  Staging form: Prostate, AJCC 8th Edition  - Clinical stage from 6/11/2014: Stage IIC (cT1c, cN0, cM0, PSA: 11, Grade Group: 4) - Signed by Trisha Narvaez MD PhD on 2/6/2024      Oncology History   Malignant neoplasm of prostate (Multi)   6/11/2014 Cancer Staged    Staging form: Prostate, AJCC 8th Edition, Clinical stage from 6/11/2014: Stage IIC (cT1c, cN0, cM0, PSA: 11, Grade Group: 4) - Signed by Trisha Narvaez MD PhD on 2/6/2024 2/6/2024 Initial Diagnosis    Malignant neoplasm of prostate (CMS/HCC)           {Assess/PlanSmartLinks:60939}         Yaneli Fagan, APRN-CNP

## 2024-08-28 ENCOUNTER — LAB (OUTPATIENT)
Dept: LAB | Facility: LAB | Age: 74
End: 2024-08-28
Payer: MEDICARE

## 2024-08-28 DIAGNOSIS — I10 HYPERTENSION, UNSPECIFIED TYPE: ICD-10-CM

## 2024-08-28 DIAGNOSIS — C61 PROSTATE CANCER (MULTI): ICD-10-CM

## 2024-08-28 LAB
ALBUMIN SERPL BCP-MCNC: 4.1 G/DL (ref 3.4–5)
ALP SERPL-CCNC: 69 U/L (ref 33–136)
ALT SERPL W P-5'-P-CCNC: 18 U/L (ref 10–52)
ANION GAP SERPL CALC-SCNC: 12 MMOL/L (ref 10–20)
AST SERPL W P-5'-P-CCNC: 20 U/L (ref 9–39)
BASOPHILS # BLD AUTO: 0.03 X10*3/UL (ref 0–0.1)
BASOPHILS NFR BLD AUTO: 1.1 %
BILIRUB SERPL-MCNC: 0.8 MG/DL (ref 0–1.2)
BUN SERPL-MCNC: 19 MG/DL (ref 6–23)
CALCIUM SERPL-MCNC: 9.2 MG/DL (ref 8.6–10.6)
CHLORIDE SERPL-SCNC: 101 MMOL/L (ref 98–107)
CO2 SERPL-SCNC: 33 MMOL/L (ref 21–32)
CREAT SERPL-MCNC: 1.01 MG/DL (ref 0.5–1.3)
EGFRCR SERPLBLD CKD-EPI 2021: 79 ML/MIN/1.73M*2
EOSINOPHIL # BLD AUTO: 0.21 X10*3/UL (ref 0–0.4)
EOSINOPHIL NFR BLD AUTO: 7.4 %
ERYTHROCYTE [DISTWIDTH] IN BLOOD BY AUTOMATED COUNT: 14 % (ref 11.5–14.5)
GLUCOSE SERPL-MCNC: 103 MG/DL (ref 74–99)
HCT VFR BLD AUTO: 39.9 % (ref 41–52)
HGB BLD-MCNC: 12.8 G/DL (ref 13.5–17.5)
IMM GRANULOCYTES # BLD AUTO: 0.01 X10*3/UL (ref 0–0.5)
IMM GRANULOCYTES NFR BLD AUTO: 0.4 % (ref 0–0.9)
LYMPHOCYTES # BLD AUTO: 1.2 X10*3/UL (ref 0.8–3)
LYMPHOCYTES NFR BLD AUTO: 42.4 %
MCH RBC QN AUTO: 26.9 PG (ref 26–34)
MCHC RBC AUTO-ENTMCNC: 32.1 G/DL (ref 32–36)
MCV RBC AUTO: 84 FL (ref 80–100)
MONOCYTES # BLD AUTO: 0.4 X10*3/UL (ref 0.05–0.8)
MONOCYTES NFR BLD AUTO: 14.1 %
NEUTROPHILS # BLD AUTO: 0.98 X10*3/UL (ref 1.6–5.5)
NEUTROPHILS NFR BLD AUTO: 34.6 %
NRBC BLD-RTO: 0 /100 WBCS (ref 0–0)
PLATELET # BLD AUTO: 211 X10*3/UL (ref 150–450)
POTASSIUM SERPL-SCNC: 3.6 MMOL/L (ref 3.5–5.3)
PROT SERPL-MCNC: 7.1 G/DL (ref 6.4–8.2)
PSA SERPL-MCNC: 0.17 NG/ML
RBC # BLD AUTO: 4.76 X10*6/UL (ref 4.5–5.9)
SODIUM SERPL-SCNC: 142 MMOL/L (ref 136–145)
TESTOST SERPL-MCNC: <30 NG/DL (ref 240–1000)
WBC # BLD AUTO: 2.8 X10*3/UL (ref 4.4–11.3)

## 2024-08-28 PROCEDURE — 84403 ASSAY OF TOTAL TESTOSTERONE: CPT

## 2024-08-28 PROCEDURE — 84153 ASSAY OF PSA TOTAL: CPT

## 2024-08-28 PROCEDURE — 36415 COLL VENOUS BLD VENIPUNCTURE: CPT

## 2024-08-28 PROCEDURE — 80053 COMPREHEN METABOLIC PANEL: CPT

## 2024-08-28 PROCEDURE — 85025 COMPLETE CBC W/AUTO DIFF WBC: CPT

## 2024-08-28 RX ORDER — LOSARTAN POTASSIUM AND HYDROCHLOROTHIAZIDE 12.5; 1 MG/1; MG/1
1 TABLET ORAL DAILY
Qty: 90 TABLET | Refills: 1 | Status: SHIPPED | OUTPATIENT
Start: 2024-08-28

## 2024-08-29 ENCOUNTER — OFFICE VISIT (OUTPATIENT)
Dept: HEMATOLOGY/ONCOLOGY | Facility: HOSPITAL | Age: 74
End: 2024-08-29
Payer: MEDICARE

## 2024-08-29 VITALS
DIASTOLIC BLOOD PRESSURE: 86 MMHG | RESPIRATION RATE: 22 BRPM | WEIGHT: 221.34 LBS | SYSTOLIC BLOOD PRESSURE: 153 MMHG | TEMPERATURE: 97.5 F | OXYGEN SATURATION: 98 % | HEART RATE: 51 BPM | BODY MASS INDEX: 30.02 KG/M2

## 2024-08-29 DIAGNOSIS — C61 PROSTATE CANCER (MULTI): Primary | ICD-10-CM

## 2024-08-29 DIAGNOSIS — R39.16 URINARY STRAINING: ICD-10-CM

## 2024-08-29 PROCEDURE — 3077F SYST BP >= 140 MM HG: CPT | Performed by: NURSE PRACTITIONER

## 2024-08-29 PROCEDURE — 1036F TOBACCO NON-USER: CPT | Performed by: NURSE PRACTITIONER

## 2024-08-29 PROCEDURE — 3079F DIAST BP 80-89 MM HG: CPT | Performed by: NURSE PRACTITIONER

## 2024-08-29 PROCEDURE — 1159F MED LIST DOCD IN RCRD: CPT | Performed by: NURSE PRACTITIONER

## 2024-08-29 PROCEDURE — 99214 OFFICE O/P EST MOD 30 MIN: CPT | Performed by: NURSE PRACTITIONER

## 2024-08-29 PROCEDURE — 1126F AMNT PAIN NOTED NONE PRSNT: CPT | Performed by: NURSE PRACTITIONER

## 2024-08-29 PROCEDURE — 1160F RVW MEDS BY RX/DR IN RCRD: CPT | Performed by: NURSE PRACTITIONER

## 2024-08-29 ASSESSMENT — PAIN SCALES - GENERAL: PAINLEVEL: 0-NO PAIN

## 2024-08-29 NOTE — PROGRESS NOTES
"Patient ID: Christiano Masters is a 73 y.o. male.  72 yo AA male known to us with BCR after local definitive RT for HR disease (iPSA 11, Sal 4+4, cN0M0 by conventional scans) diagnosed in 06/2014, treated with 18m ADT (planned for 24m, stopped due to weight gain and hypertension) and definitive radiation to the prostate (79.2Gy in 44 fractions, completed 1/13/2015).   Lost to follow up, lowest PSA was 0.26, with BCR in 02/2022 (PSA 3), PSA most recently 5.97 in 12/23 with PSMA PET/CT suggesting local prostate recurrence (left anterior TZ, SUV 5.5).  PSADT currently ~10 months. He has chronic lower back pain.   MRI and TRUS/Bx done and path now c/w GS8 disease- both side of the gland  Discussed salvage options and he is interested in radiation- has many questions about AEs and despite conversations he has had with Rad Onc it does not sound he has captured the entire data  4/18/24 patient met with Dr. Hassan and recommendation: Salvage RT and ADT for 6 months was favored.   7/19/24 started relugolix  RT started 7/25/24 and completed 8/23/24.          Subjective    HPI    Seen in follow up for his prostate cancer.   He has been on reluglix for a bit over a month andd completed RT on 8/23.     Energy is good.   Appetite \"excellent\".   Denies cough/sob/fever.   Denies n/v.   Bowels loose. Goes 1-2 x per day.   +hot flashes, not frequent and not severe.     Reports straining and frequency started a little bit before RT. Already on flomax, one a day. Got a bit worse with RT.   Denies dysuria/hematuria.   Nocturia every 2 hrs.       Objective    BSA: 2.25 meters squared  /86 (BP Location: Left arm, Patient Position: Sitting, BP Cuff Size: Adult)   Pulse 51   Temp 36.4 °C (97.5 °F) (Temporal)   Resp 22   Wt 100 kg (221 lb 5.5 oz)   SpO2 98%   BMI 30.02 kg/m²      Physical Exam  Vitals reviewed.   Constitutional:       General: He is not in acute distress.  Eyes:      General: No scleral " icterus.  Cardiovascular:      Rate and Rhythm: Normal rate and regular rhythm.   Pulmonary:      Effort: Pulmonary effort is normal.      Breath sounds: Normal breath sounds.   Abdominal:      General: Bowel sounds are normal. There is no distension.      Palpations: Abdomen is soft.      Tenderness: There is no abdominal tenderness. There is no guarding.   Musculoskeletal:      Right lower leg: No edema.      Left lower leg: No edema.   Skin:     General: Skin is warm and dry.   Neurological:      Mental Status: He is alert and oriented to person, place, and time.   Psychiatric:         Mood and Affect: Mood normal.         Behavior: Behavior normal.         Performance Status:  Symptomatic; fully ambulatory    Lab Results   Component Value Date    PSA 0.17 08/28/2024    PSA 6.65 (H) 07/19/2024    PSA 6.28 (H) 04/18/2024     Lab Results   Component Value Date    WBC 2.8 (L) 08/28/2024    HGB 12.8 (L) 08/28/2024    HCT 39.9 (L) 08/28/2024    MCV 84 08/28/2024     08/28/2024     Lab Results   Component Value Date    GLUCOSE 103 (H) 08/28/2024    CALCIUM 9.2 08/28/2024     08/28/2024    K 3.6 08/28/2024    CO2 33 (H) 08/28/2024     08/28/2024    BUN 19 08/28/2024    CREATININE 1.01 08/28/2024     Lab Results   Component Value Date    TESTOSTERONE <30 (L) 08/28/2024     Lab Results   Component Value Date    ALT 18 08/28/2024    AST 20 08/28/2024     (H) 05/19/2022    ALKPHOS 69 08/28/2024    BILITOT 0.8 08/28/2024      Assessment/Plan   PSA response, T castrate  WBC low likely from RT. Monitor.   Follow up in 3 mos with labs and provider visit.    Follow up with Urology for urinary straining and frequency. Patient said it started prior to RT but has gotten a bit worse. He is on one flomax per day. I told him he can try to take two flomax a day. Can get better with time as he gets further from RT.       Cancer Staging   Malignant neoplasm of prostate (Multi)  Staging form: Prostate, AJCC 8th  Edition  - Clinical stage from 6/11/2014: Stage IIC (cT1c, cN0, cM0, PSA: 11, Grade Group: 4) - Signed by Trisha Narvaez MD PhD on 2/6/2024      Oncology History   Malignant neoplasm of prostate (Multi)   6/11/2014 Cancer Staged    Staging form: Prostate, AJCC 8th Edition, Clinical stage from 6/11/2014: Stage IIC (cT1c, cN0, cM0, PSA: 11, Grade Group: 4) - Signed by Trisha Narvaez MD PhD on 2/6/2024 2/6/2024 Initial Diagnosis    Malignant neoplasm of prostate (CMS/HCC)          Diagnoses and all orders for this visit:  Prostate cancer (Multi)  -     Clinic Appointment Request CIRILO BERRY (or ignacio); Future  -     CBC and Auto Differential; Future  -     Comprehensive Metabolic Panel; Future  -     Prostate Specific Antigen; Future  -     Testosterone; Future  Urinary straining  -     Clinic Appointment Request Follow up; ROGERIO BROWN (follow up for trouble urinating); Future           MARCIE Lopez-CNP

## 2024-09-06 ENCOUNTER — SPECIALTY PHARMACY (OUTPATIENT)
Dept: PHARMACY | Facility: CLINIC | Age: 74
End: 2024-09-06

## 2024-09-06 PROCEDURE — RXMED WILLOW AMBULATORY MEDICATION CHARGE

## 2024-09-09 ENCOUNTER — PHARMACY VISIT (OUTPATIENT)
Dept: PHARMACY | Facility: CLINIC | Age: 74
End: 2024-09-09
Payer: MEDICARE

## 2024-09-30 ENCOUNTER — OFFICE VISIT (OUTPATIENT)
Dept: UROLOGY | Facility: HOSPITAL | Age: 74
End: 2024-09-30
Payer: MEDICARE

## 2024-09-30 DIAGNOSIS — R39.16 URINARY STRAINING: ICD-10-CM

## 2024-09-30 DIAGNOSIS — N40.0 BENIGN PROSTATIC HYPERPLASIA, UNSPECIFIED WHETHER LOWER URINARY TRACT SYMPTOMS PRESENT: Primary | ICD-10-CM

## 2024-09-30 DIAGNOSIS — R39.9 LOWER URINARY TRACT SYMPTOMS (LUTS): ICD-10-CM

## 2024-09-30 DIAGNOSIS — N13.9 URINARY OBSTRUCTION: ICD-10-CM

## 2024-09-30 PROCEDURE — 99214 OFFICE O/P EST MOD 30 MIN: CPT | Performed by: STUDENT IN AN ORGANIZED HEALTH CARE EDUCATION/TRAINING PROGRAM

## 2024-09-30 RX ORDER — TAMSULOSIN HYDROCHLORIDE 0.4 MG/1
0.8 CAPSULE ORAL DAILY
Qty: 180 CAPSULE | Refills: 2 | Status: SHIPPED | OUTPATIENT
Start: 2024-09-30 | End: 2025-06-27

## 2024-09-30 NOTE — PROGRESS NOTES
"UROLOGIC FOLLOW-UP VISIT     PROBLEM LIST:  1. Lower urinary tract symptoms (LUTS)  Measure post void residual      2. Urinary straining  Clinic Appointment Request Follow up; ROGERIO BROWN (follow up for trouble urinating)           HISTORY OF PRESENT ILLNESS:   73 y/o male with recurrent prostate cancer after radiation therapy. Patient underwent spaceoar hydrogel placement on 5/10/24 and had persistent perineal pain. This has since subsided. He started RT on 7/25/24 and completed 8/23/24. 7/19/24 started relugolix for 6 months.     Denies pushing or straining to void and states he feels he occasionally experiences incomplete emptying. Patient denies any pushing or straining to urinate. NTF 4-5 times per night.  Post-void residual today is 147 cc. He utilizes 0.4mg of Flomax daily. He does take this either first thing in the morning or mid day.     Denies any fevers, chills, nausea, vomiting.    PAST MEDICAL HISTORY:  Past Medical History:   Diagnosis Date    A-fib (Multi)     on Eliquis, pre surgery stop instructions and clearance in 2/9/24 \"letters\"    Acute hepatitis C     Genotype 1a, s/p Harvoni [id number]    Anxiety     ASCVD (arteriosclerotic cardiovascular disease)     Benign prostatic hyperplasia with lower urinary tract symptoms     Chronic pain disorder     back    CKD (chronic kidney disease)     CKD (chronic kidney disease)     Cluster headache, chronic     Elevated PSA 12/21/2023    5.97    GERD (gastroesophageal reflux disease)     Hepatitis B     + antibody    Hyperaldosteronism (Multi)     Hypercholesteremia     Hypertension     Mitral valve insufficiency     Monoclonal gammopathy     MGUS followed by Hem/Onc Dr. Ochoa    JEFFREY (obstructive sleep apnea)     Prostate cancer (Multi)     RT and ADT 0916-7233    Tobacco use disorder        PAST SURGICAL HISTORY:  Past Surgical History:   Procedure Laterality Date    CARDIOVERSION  12/2022    and Ablation for A Fib    COLONOSCOPY  08/26/2019    " Colonoscopy    HERNIA REPAIR  11/15/2021    Hernia repair    PROSTATE BIOPSY  06/11/2014        ALLERGIES:   No Known Allergies     MEDICATIONS:     Current Outpatient Medications:     acetaminophen (Tylenol 8 HOUR) 650 mg ER tablet, Take 1 tablet (650 mg) by mouth every 8 hours if needed for mild pain (1 - 3). Do not crush, chew, or split., Disp: , Rfl:     amLODIPine (Norvasc) 10 mg tablet, Take 1 tablet (10 mg) by mouth once daily., Disp: 90 tablet, Rfl: 1    calcium carbonate/vitamin D3 (CALCIUM 500 + D, D3, ORAL), Take 1 tablet by mouth once daily., Disp: , Rfl:     losartan-hydrochlorothiazide (Hyzaar) 100-12.5 mg tablet, TAKE 1 TABLET BY MOUTH EVERY DAY, Disp: 90 tablet, Rfl: 1    magnesium 200 mg tablet, Take 1 tablet (200 mg) by mouth once daily., Disp: , Rfl:     metoprolol tartrate (Lopressor) 25 mg tablet, Take 1 tablet (25 mg) by mouth 2 times a day., Disp: 180 tablet, Rfl: 3    multivitamin tablet, Take 1 tablet by mouth once daily., Disp: , Rfl:     omega 3-dha-epa-fish oil (Fish OiL) 1,000 mg (120 mg-180 mg) capsule, Take 1 capsule (1,000 mg) by mouth once daily., Disp: , Rfl:     pravastatin (Pravachol) 40 mg tablet, Take 1 tablet (40 mg) by mouth once daily at bedtime., Disp: 30 tablet, Rfl: 11    relugolix (Orgovyx) 120 mg tablet, Take 1 tablet (120 mg total) by mouth once daily.  Fill 2nd - Maintenance Dose - plan for 6 months of total treatment, Disp: 30 tablet, Rfl: 4    tamsulosin (Flomax) 0.4 mg 24 hr capsule, Take 2 capsules (0.8 mg) by mouth once daily. (Patient taking differently: Take 2 capsules (0.8 mg) by mouth once daily. PRN), Disp: 180 capsule, Rfl: 2      SOCIAL HISTORY:  Patient  reports that he has quit smoking. His smoking use included cigarettes. He has never used smokeless tobacco. He reports that he does not drink alcohol and does not use drugs.   Social History     Socioeconomic History    Marital status:      Spouse name: Not on file    Number of children: Not on  file    Years of education: Not on file    Highest education level: Not on file   Occupational History    Not on file   Tobacco Use    Smoking status: Former     Current packs/day: 20.00     Types: Cigarettes    Smokeless tobacco: Never   Vaping Use    Vaping status: Never Used   Substance and Sexual Activity    Alcohol use: Never    Drug use: Never    Sexual activity: Yes   Other Topics Concern    Not on file   Social History Narrative    Not on file     Social Determinants of Health     Financial Resource Strain: Not on file   Food Insecurity: No Food Insecurity (6/7/2024)    Hunger Vital Sign     Worried About Running Out of Food in the Last Year: Never true     Ran Out of Food in the Last Year: Never true   Transportation Needs: Not on file   Physical Activity: Not on file   Stress: Not on file   Social Connections: Not on file   Intimate Partner Violence: Not on file   Housing Stability: Not on file       FAMILY HISTORY:  Family History   Family history unknown: Yes     REVIEW OF SYSTEMS:   Constitutional: Negative for fever and chills. Denies anorexia, weight loss.  Eyes: Negative for visual disturbance.   Respiratory: Negative for shortness of breath.    Cardiovascular: Negative for chest pain.   Gastrointestinal: Negative for nausea and vomiting.   Genitourinary: See interval history above.  Skin: Negative for rash.   Neurological: Negative for dizziness and numbness.   Psychiatric/Behavioral: Negative for confusion and decreased concentration.     PHYSICAL EXAM:  There were no vitals taken for this visit.  Constitutional: Patient appears well-developed and well-nourished. No distress.    Head: Normocephalic and atraumatic.    Neck: Normal range of motion.    Cardiovascular: Normal rate.    Pulmonary/Chest: Effort normal. No respiratory distress.   Abdominal: soft NTND  Musculoskeletal: Normal range of motion.    Neurological: Alert and oriented to person, place, and time.  Psychiatric: Normal mood and  affect. Behavior is normal. Thought content normal.      LABORATORY REVIEW:     Lab Results   Component Value Date    BUN 19 08/28/2024    CREATININE 1.01 08/28/2024    EGFR 79 08/28/2024     08/28/2024    K 3.6 08/28/2024     08/28/2024    CO2 33 (H) 08/28/2024    CALCIUM 9.2 08/28/2024      Lab Results   Component Value Date    WBC 2.8 (L) 08/28/2024    RBC 4.76 08/28/2024    HGB 12.8 (L) 08/28/2024    HCT 39.9 (L) 08/28/2024    MCV 84 08/28/2024    MCH 26.9 08/28/2024    MCHC 32.1 08/28/2024    RDW 14.0 08/28/2024     08/28/2024        Lab Results   Component Value Date    PSA 0.17 08/28/2024    PSA 6.65 (H) 07/19/2024    PSA 6.28 (H) 04/18/2024    PSA 5.97 (H) 12/21/2023    PSA 3.46 08/17/2023    PSA 2.84 05/22/2023    PSA 3.02 02/22/2023    PSA 1.93 07/07/2022    PSA 1.75 05/19/2022    PSA 1.52 12/20/2021    PSA 0.47 06/17/2021    PSA 0.24 12/15/2020    PSA 0.37 06/15/2020    PSA 0.34 09/17/2019    PSA 0.26 12/08/2017         Assessment:      1. Lower urinary tract symptoms (LUTS)  Measure post void residual      2. Urinary straining  Clinic Appointment Request Follow up; ROGERIO BROWN (follow up for trouble urinating)           Plan:    Reviewed and interpreted patient's PSA trend    Perineal pain has resolved and informed patient that this should not be a recurrent issue   Discussed with patient that his PVR is elevated and indicates incomplete emptying   Will start patient on 0.8mg of flomax    RTC in 4-6 weeks for sx reassessment    36  minutes total spent on patient's care today; >50% time spent on counseling/coordination of care

## 2024-10-04 ENCOUNTER — SPECIALTY PHARMACY (OUTPATIENT)
Dept: PHARMACY | Facility: CLINIC | Age: 74
End: 2024-10-04

## 2024-10-04 PROCEDURE — RXMED WILLOW AMBULATORY MEDICATION CHARGE

## 2024-10-04 NOTE — PROGRESS NOTES
RADIATION COMPLETION OF THERAPY NOTE    Patient Name:  Christiano Masters  MRN:  [id number]  :  1950    Radiation Oncologist: Trisha Narvaez MD PhD  Primary Care Provider: Merna Santiago MD    Brief History: Christiano Masters is a 74 y.o. male with Cancer Staging   Malignant neoplasm of prostate (Multi)  Staging form: Prostate, AJCC 8th Edition  - Clinical stage from 2014: Stage IIC (cT1c, cN0, cM0, PSA: 11, Grade Group: 4) - Signed by Trisha Narvaez MD PhD on 2024      The patient completed radiotherapy as outlined below.    Radiation Treatment Summary:  re-irradiation of prostate  SBRT: Not Applicable Prostate    Treatment Period Technique Fraction Dose Fractions Total Dose   Course 2 2024-2024  (days elapsed: 28)         Prost_ReRT 2024-2024 SBRT 725 / 725 cGy 5 / 5 3625 / 3,625 cGy     Concurrent Systemic Therapy: ADT    CTCAE Toxicity Overview:     Patient Disposition: The patient is scheduled for follow up at our clinic on 24 with Dr. Narvaez, labs prior. The patient is encouraged to contact the radiation department for any questions or concerns in the interim.    Future Appointments   Date Time Provider Department Center   2024  9:20 AM Dima Meyer MD [facility]   2024  1:30 PM Trisha Narvaez MD PhD [id number]   2024 11:00 AM MARCIE Lopez-CNP [id number]

## 2024-10-05 ENCOUNTER — PHARMACY VISIT (OUTPATIENT)
Dept: PHARMACY | Facility: CLINIC | Age: 74
End: 2024-10-05
Payer: MEDICARE

## 2024-11-04 ENCOUNTER — OFFICE VISIT (OUTPATIENT)
Dept: UROLOGY | Facility: HOSPITAL | Age: 74
End: 2024-11-04
Payer: MEDICARE

## 2024-11-04 DIAGNOSIS — R39.9 LOWER URINARY TRACT SYMPTOMS (LUTS): ICD-10-CM

## 2024-11-04 DIAGNOSIS — N13.9 URINARY OBSTRUCTION: ICD-10-CM

## 2024-11-04 DIAGNOSIS — N40.0 BENIGN PROSTATIC HYPERPLASIA, UNSPECIFIED WHETHER LOWER URINARY TRACT SYMPTOMS PRESENT: ICD-10-CM

## 2024-11-04 DIAGNOSIS — C61 PROSTATE CANCER (MULTI): ICD-10-CM

## 2024-11-04 DIAGNOSIS — C61 LOCAL RECURRENCE OF PROSTATE CANCER (MULTI): ICD-10-CM

## 2024-11-04 DIAGNOSIS — R39.16 URINARY STRAINING: ICD-10-CM

## 2024-11-04 PROCEDURE — 99417 PROLNG OP E/M EACH 15 MIN: CPT | Performed by: STUDENT IN AN ORGANIZED HEALTH CARE EDUCATION/TRAINING PROGRAM

## 2024-11-04 PROCEDURE — 99214 OFFICE O/P EST MOD 30 MIN: CPT | Performed by: STUDENT IN AN ORGANIZED HEALTH CARE EDUCATION/TRAINING PROGRAM

## 2024-11-04 NOTE — PROGRESS NOTES
"UROLOGIC FOLLOW-UP VISIT     PROBLEM LIST:  1. Lower urinary tract symptoms (LUTS)        2. Urinary obstruction        3. Prostate cancer (Multi)        4. Local recurrence of prostate cancer (Multi)        5. Benign prostatic hyperplasia, unspecified whether lower urinary tract symptoms present        6. Urinary straining  Measure post void residual             HISTORY OF PRESENT ILLNESS:   75 y/o male with recurrent prostate cancer after radiation therapy. Patient underwent spaceoar hydrogel placement on 5/10/24 and had persistent perineal pain. This has since subsided. He started RT on 7/25/24 and completed 8/23/24. 7/19/24 started relugolix for 6 months. He presents for a follow up today. Post-void residual today is 171 cc.     Patient denies any pushing or straining to urinate. NTF 2-3 times per night which does bothers him, and admits that he has not cut down his liquid intake at night as dicussed.  He utilizes 0.8mg of Flomax daily which helps with his urine stream.     Denies any fevers, chills, nausea, vomiting.    PAST MEDICAL HISTORY:  Past Medical History:   Diagnosis Date    A-fib (Multi)     on Eliquis, pre surgery stop instructions and clearance in 2/9/24 \"letters\"    Acute hepatitis C     Genotype 1a, s/p Harvoni [id number]    Anxiety     ASCVD (arteriosclerotic cardiovascular disease)     Benign prostatic hyperplasia with lower urinary tract symptoms     Chronic pain disorder     back    CKD (chronic kidney disease)     CKD (chronic kidney disease)     Cluster headache, chronic     Elevated PSA 12/21/2023    5.97    GERD (gastroesophageal reflux disease)     Hepatitis B     + antibody    Hyperaldosteronism (Multi)     Hypercholesteremia     Hypertension     Mitral valve insufficiency     Monoclonal gammopathy     MGUS followed by Hem/Onc Dr. Ochoa    JEFFREY (obstructive sleep apnea)     Prostate cancer (Multi)     RT and ADT [id number]    Tobacco use disorder        PAST SURGICAL HISTORY:  Past " Surgical History:   Procedure Laterality Date    CARDIOVERSION  12/2022    and Ablation for A Fib    COLONOSCOPY  08/26/2019    Colonoscopy    HERNIA REPAIR  11/15/2021    Hernia repair    PROSTATE BIOPSY  06/11/2014        ALLERGIES:   No Known Allergies     MEDICATIONS:     Current Outpatient Medications:     acetaminophen (Tylenol 8 HOUR) 650 mg ER tablet, Take 1 tablet (650 mg) by mouth every 8 hours if needed for mild pain (1 - 3). Do not crush, chew, or split., Disp: , Rfl:     amLODIPine (Norvasc) 10 mg tablet, Take 1 tablet (10 mg) by mouth once daily., Disp: 90 tablet, Rfl: 1    calcium carbonate/vitamin D3 (CALCIUM 500 + D, D3, ORAL), Take 1 tablet by mouth once daily., Disp: , Rfl:     losartan-hydrochlorothiazide (Hyzaar) 100-12.5 mg tablet, TAKE 1 TABLET BY MOUTH EVERY DAY, Disp: 90 tablet, Rfl: 1    magnesium 200 mg tablet, Take 1 tablet (200 mg) by mouth once daily., Disp: , Rfl:     metoprolol tartrate (Lopressor) 25 mg tablet, Take 1 tablet (25 mg) by mouth 2 times a day., Disp: 180 tablet, Rfl: 3    multivitamin tablet, Take 1 tablet by mouth once daily., Disp: , Rfl:     omega 3-dha-epa-fish oil (Fish OiL) 1,000 mg (120 mg-180 mg) capsule, Take 1 capsule (1,000 mg) by mouth once daily., Disp: , Rfl:     pravastatin (Pravachol) 40 mg tablet, Take 1 tablet (40 mg) by mouth once daily at bedtime., Disp: 30 tablet, Rfl: 11    relugolix (Orgovyx) 120 mg tablet, Take 1 tablet (120 mg total) by mouth once daily.  Fill 2nd - Maintenance Dose - plan for 6 months of total treatment, Disp: 30 tablet, Rfl: 4    tamsulosin (Flomax) 0.4 mg 24 hr capsule, Take 2 capsules (0.8 mg) by mouth once daily., Disp: 180 capsule, Rfl: 2      SOCIAL HISTORY:  Patient  reports that he has quit smoking. His smoking use included cigarettes. He has never used smokeless tobacco. He reports that he does not drink alcohol and does not use drugs.   Social History     Socioeconomic History    Marital status:      Spouse  name: Not on file    Number of children: Not on file    Years of education: Not on file    Highest education level: Not on file   Occupational History    Not on file   Tobacco Use    Smoking status: Former     Current packs/day: 20.00     Types: Cigarettes    Smokeless tobacco: Never   Vaping Use    Vaping status: Never Used   Substance and Sexual Activity    Alcohol use: Never    Drug use: Never    Sexual activity: Yes   Other Topics Concern    Not on file   Social History Narrative    Not on file     Social Drivers of Health     Financial Resource Strain: Not on file   Food Insecurity: No Food Insecurity (6/7/2024)    Hunger Vital Sign     Worried About Running Out of Food in the Last Year: Never true     Ran Out of Food in the Last Year: Never true   Transportation Needs: Not on file   Physical Activity: Not on file   Stress: Not on file   Social Connections: Not on file   Intimate Partner Violence: Not on file   Housing Stability: Not on file       FAMILY HISTORY:  Family History   Family history unknown: Yes     REVIEW OF SYSTEMS:   Constitutional: Negative for fever and chills. Denies anorexia, weight loss.  Eyes: Negative for visual disturbance.   Respiratory: Negative for shortness of breath.    Cardiovascular: Negative for chest pain.   Gastrointestinal: Negative for nausea and vomiting.   Genitourinary: See interval history above.  Skin: Negative for rash.   Neurological: Negative for dizziness and numbness.   Psychiatric/Behavioral: Negative for confusion and decreased concentration.     PHYSICAL EXAM:  There were no vitals taken for this visit.  Constitutional: Patient appears well-developed and well-nourished. No distress.    Head: Normocephalic and atraumatic.    Neck: Normal range of motion.    Cardiovascular: Normal rate.    Pulmonary/Chest: Effort normal. No respiratory distress.   Abdominal: soft NTND  Musculoskeletal: Normal range of motion.    Neurological: Alert and oriented to person, place,  and time.  Psychiatric: Normal mood and affect. Behavior is normal. Thought content normal.      LABORATORY REVIEW:     Lab Results   Component Value Date    BUN 19 08/28/2024    CREATININE 1.01 08/28/2024    EGFR 79 08/28/2024     08/28/2024    K 3.6 08/28/2024     08/28/2024    CO2 33 (H) 08/28/2024    CALCIUM 9.2 08/28/2024      Lab Results   Component Value Date    WBC 2.8 (L) 08/28/2024    RBC 4.76 08/28/2024    HGB 12.8 (L) 08/28/2024    HCT 39.9 (L) 08/28/2024    MCV 84 08/28/2024    MCH 26.9 08/28/2024    MCHC 32.1 08/28/2024    RDW 14.0 08/28/2024     08/28/2024        Lab Results   Component Value Date    PSA 0.17 08/28/2024    PSA 6.65 (H) 07/19/2024    PSA 6.28 (H) 04/18/2024    PSA 5.97 (H) 12/21/2023    PSA 3.46 08/17/2023    PSA 2.84 05/22/2023    PSA 3.02 02/22/2023    PSA 1.93 07/07/2022    PSA 1.75 05/19/2022    PSA 1.52 12/20/2021    PSA 0.47 06/17/2021    PSA 0.24 12/15/2020    PSA 0.37 06/15/2020    PSA 0.34 09/17/2019    PSA 0.26 12/08/2017         Assessment:      1. Lower urinary tract symptoms (LUTS)        2. Urinary obstruction        3. Prostate cancer (Multi)        4. Local recurrence of prostate cancer (Multi)        5. Benign prostatic hyperplasia, unspecified whether lower urinary tract symptoms present        6. Urinary straining  Measure post void residual             Plan:   We discussed his presentation in detail.  Reviewed and interpreted patient's PSA trend  Discussed with patient that his PVR is elevated and indicates incomplete emptying. PVR today is 171 ml.   I advised him to reduce his fluid intake at night.  We also discussed the available treatment options moving forward, including surgical interventions.  Will continue on 0.8mg of flomax   RTC in 4-6 weeks for sx reassessment    35  minutes total spent on patient's care today; >50% time spent on counseling/coordination of care      Scribe Attestation  By signing my name below, IMarj   ,  Scribe   attest that this documentation has been prepared under the direction and in the presence of Dima Meyer MD.  \

## 2024-11-05 ENCOUNTER — SPECIALTY PHARMACY (OUTPATIENT)
Dept: PHARMACY | Facility: CLINIC | Age: 74
End: 2024-11-05

## 2024-11-05 PROCEDURE — RXMED WILLOW AMBULATORY MEDICATION CHARGE

## 2024-11-07 ENCOUNTER — PHARMACY VISIT (OUTPATIENT)
Dept: PHARMACY | Facility: CLINIC | Age: 74
End: 2024-11-07
Payer: MEDICARE

## 2024-11-21 ENCOUNTER — TELEPHONE (OUTPATIENT)
Dept: RADIATION ONCOLOGY | Facility: HOSPITAL | Age: 74
End: 2024-11-21
Payer: MEDICARE

## 2024-11-21 NOTE — TELEPHONE ENCOUNTER
Called pt. to remind of appointment on 11/22/2024 at 1:30 pm with Dr. Narvaez.  Pt answered and confirmed appointment

## 2024-11-22 ENCOUNTER — HOSPITAL ENCOUNTER (OUTPATIENT)
Dept: RADIATION ONCOLOGY | Facility: HOSPITAL | Age: 74
Setting detail: RADIATION/ONCOLOGY SERIES
Discharge: HOME | End: 2024-11-22
Payer: MEDICARE

## 2024-11-22 ENCOUNTER — LAB (OUTPATIENT)
Dept: LAB | Facility: LAB | Age: 74
End: 2024-11-22
Payer: MEDICARE

## 2024-11-22 VITALS
DIASTOLIC BLOOD PRESSURE: 70 MMHG | RESPIRATION RATE: 18 BRPM | WEIGHT: 214.2 LBS | BODY MASS INDEX: 29.01 KG/M2 | HEART RATE: 60 BPM | SYSTOLIC BLOOD PRESSURE: 112 MMHG | TEMPERATURE: 97.3 F | HEIGHT: 72 IN | OXYGEN SATURATION: 96 %

## 2024-11-22 DIAGNOSIS — C61 MALIGNANT NEOPLASM OF PROSTATE (MULTI): ICD-10-CM

## 2024-11-22 DIAGNOSIS — C61 PROSTATE CANCER (MULTI): Primary | ICD-10-CM

## 2024-11-22 DIAGNOSIS — C61 PROSTATE CANCER (MULTI): ICD-10-CM

## 2024-11-22 LAB
ALBUMIN SERPL BCP-MCNC: 4.2 G/DL (ref 3.4–5)
ALP SERPL-CCNC: 77 U/L (ref 33–136)
ALT SERPL W P-5'-P-CCNC: 12 U/L (ref 10–52)
ANION GAP SERPL CALC-SCNC: 12 MMOL/L (ref 10–20)
AST SERPL W P-5'-P-CCNC: 19 U/L (ref 9–39)
BASOPHILS # BLD AUTO: 0.03 X10*3/UL (ref 0–0.1)
BASOPHILS NFR BLD AUTO: 0.8 %
BILIRUB SERPL-MCNC: 0.6 MG/DL (ref 0–1.2)
BUN SERPL-MCNC: 22 MG/DL (ref 6–23)
CALCIUM SERPL-MCNC: 9.3 MG/DL (ref 8.6–10.6)
CHLORIDE SERPL-SCNC: 99 MMOL/L (ref 98–107)
CO2 SERPL-SCNC: 34 MMOL/L (ref 21–32)
CREAT SERPL-MCNC: 1.29 MG/DL (ref 0.5–1.3)
EGFRCR SERPLBLD CKD-EPI 2021: 58 ML/MIN/1.73M*2
EOSINOPHIL # BLD AUTO: 0.19 X10*3/UL (ref 0–0.4)
EOSINOPHIL NFR BLD AUTO: 5.1 %
ERYTHROCYTE [DISTWIDTH] IN BLOOD BY AUTOMATED COUNT: 13.6 % (ref 11.5–14.5)
GLUCOSE SERPL-MCNC: 121 MG/DL (ref 74–99)
HCT VFR BLD AUTO: 37.6 % (ref 41–52)
HGB BLD-MCNC: 12.1 G/DL (ref 13.5–17.5)
IMM GRANULOCYTES # BLD AUTO: 0.01 X10*3/UL (ref 0–0.5)
IMM GRANULOCYTES NFR BLD AUTO: 0.3 % (ref 0–0.9)
LYMPHOCYTES # BLD AUTO: 1.43 X10*3/UL (ref 0.8–3)
LYMPHOCYTES NFR BLD AUTO: 38.4 %
MCH RBC QN AUTO: 27.1 PG (ref 26–34)
MCHC RBC AUTO-ENTMCNC: 32.2 G/DL (ref 32–36)
MCV RBC AUTO: 84 FL (ref 80–100)
MONOCYTES # BLD AUTO: 0.49 X10*3/UL (ref 0.05–0.8)
MONOCYTES NFR BLD AUTO: 13.2 %
NEUTROPHILS # BLD AUTO: 1.57 X10*3/UL (ref 1.6–5.5)
NEUTROPHILS NFR BLD AUTO: 42.2 %
NRBC BLD-RTO: 0 /100 WBCS (ref 0–0)
PLATELET # BLD AUTO: 251 X10*3/UL (ref 150–450)
POTASSIUM SERPL-SCNC: 3.6 MMOL/L (ref 3.5–5.3)
PROT SERPL-MCNC: 6.8 G/DL (ref 6.4–8.2)
PSA SERPL-MCNC: <0.1 NG/ML
RBC # BLD AUTO: 4.47 X10*6/UL (ref 4.5–5.9)
SODIUM SERPL-SCNC: 141 MMOL/L (ref 136–145)
TESTOST SERPL-MCNC: <30 NG/DL (ref 240–1000)
WBC # BLD AUTO: 3.7 X10*3/UL (ref 4.4–11.3)

## 2024-11-22 PROCEDURE — 80053 COMPREHEN METABOLIC PANEL: CPT

## 2024-11-22 PROCEDURE — 85025 COMPLETE CBC W/AUTO DIFF WBC: CPT

## 2024-11-22 PROCEDURE — 84403 ASSAY OF TOTAL TESTOSTERONE: CPT

## 2024-11-22 PROCEDURE — 99213 OFFICE O/P EST LOW 20 MIN: CPT | Performed by: STUDENT IN AN ORGANIZED HEALTH CARE EDUCATION/TRAINING PROGRAM

## 2024-11-22 PROCEDURE — 36415 COLL VENOUS BLD VENIPUNCTURE: CPT

## 2024-11-22 PROCEDURE — 84153 ASSAY OF PSA TOTAL: CPT

## 2024-11-22 NOTE — PROGRESS NOTES
Staff Physician: Trisha Narvaez MD PhD  Date of Service: 11/22/2024  Patient name: Christiano Masters   MRN: [id number]    RADIATION ONCOLOGY FOLLOW UP NOTE    IDENTIFYING DATA:  DIAGNOSIS: Recurrent disease, local   Cancer Staging   Malignant neoplasm of prostate (Multi)  Staging form: Prostate, AJCC 8th Edition  - Clinical stage from 6/11/2014: Stage IIC (cT1c, cN0, cM0, PSA: 11, Grade Group: 4) - Signed by Trisha Narvaez MD PhD on 2/6/2024    DISEASE STATE: Undergoing active treatment  DISEASE STATUS: Controlled  Problem List Items Addressed This Visit       Malignant neoplasm of prostate (Multi)    Relevant Orders    Clinic Appointment Request Follow Up; TRISHA NARVAEZ; [facility] S600 RADONC (Completed)     He was last seen in Radiation Oncology on 8/23/2024 at the completion of RT, and returns today for routine follow-up.    Oncologic History:    6/2014: Diagnosed with high risk prostate adenocarcinoma (iPSA 11, Sal 4+4, cN0M0 by conventional scans) diagnosed in 06/2014, treated with 18m ADT (planned for 24m, stopped due to weight gain and hypertension) and definitive radiation to the prostate (79.2Gy in 44 fractions, completed 1/13/2015). PSA julieta was 0.26.     2/2022: BCR, PSA 3.0      12/11/23: PSMA PET suggested local prostate recurrence (left anterior TZ, SUV 5.5)      3/8/24: Prostate Biopsy with Dr. Meyer noted Sal Grade 4+4=8, no mention of radiation treatment effect; overall 12/22 + cores (4/10 + ABBY, 2 cores total with GG4).      4/18/24: PSA 6.28 (testosterone 568)     5/10/24: SpaceOAR + fiducials by Dr. Meyer    7/19/2024 started Orgovyx    Completed whole-gland re-irradiation, with focal boost     SBRT: Not Applicable Prostate (Resolved)    Treatment Period Technique Fraction Dose Fractions Total Dose   Course 2 7/26/2024-8/23/2024  (days elapsed: 28)         Prost_ReRT 7/26/2024-8/23/2024 SBRT 725 / 725 cGy 5 / 5 3625 / 3,625 cGy       Interval History:  Today, he is here by himself and reports  feeling well.   IPSS of 6, nocturia x4 (4x has been his baseline prior to re-irradiation). On flomax. His IPSS ranged 9-16 prior to re-irradiation. Denies any symptoms of proctitis.   Intermittent hot flashes that are tolerable. Denies any joint pain. No unintentional weight gain since starting ADT. HgbA1c stable at 5.8%    A 10 point review of systems was reviewed with pertinent positives and negatives noted in HPI. All other systems have been reviewed and are negative.    PERFORMANCE STATUS:  Karnofsky Performance Score/ECO, Able to carry on normal activity; minor signs or symptoms of disease (ECOG equivalent 0)    PHYSICAL EXAMINATION:  /70   Pulse 60   Temp 36.3 °C (97.3 °F) (Temporal)   Resp 18   Ht 1.829 m (6')   Wt 97.2 kg (214 lb 3.2 oz)   SpO2 96%   BMI 29.05 kg/m²   Constitutional: well developed, no distress, alert & oriented, cooperative  Eyes: pupils equal round and reactive to light, extraocular movements intact  Respiratory: normal work of breathing    CTCAE (v5) ADVERSE EVENTS:  Toxicity Assessment          2024    14:00   Toxicity Assessment   Diarrhea Grade 0   Fatigue Grade 0   Proctitis Grade 0   Urinary Frequency Grade 1       IPSS 6, nocturia x4 (this amount of nocturia was his baseline prior to re-irradiation)   Urinary Retention Grade 2       flomax 0.8mg   Urinary Tract Pain Grade 0   Urinary Urgency Grade 0       DIAGNOSTIC REPORTS REVIEWED:  Imaging: All imaging was personally reviewed and interpreted in clinic. Findings as per interval history and EMR.  Laboratory/Pathology:  All pertinent labs and pathology were personally reviewed and interpreted in clinic. Findings as per interval history and EMR.  Lab Results   Component Value Date    PSA <0.10 2024    PSA 0.17 2024    PSA 6.65 (H) 2024    PSA 6.28 (H) 2024    PSA 5.97 (H) 2023    PSA 3.46 2023    PSA 2.84 2023    PSA 3.02 2023    PSA 1.93 2022    PSA 1.75  05/19/2022     Lab Results   Component Value Date    TESTOSTERONE <30 (L) 11/22/2024    TESTOSTERONE <30 (L) 08/28/2024    TESTOSTERONE 450 07/19/2024       IMPRESSION:  74 year-old gentleman with recurrent high risk prostate cancer; PSA 4/18/24 was 6.28; biopsy on 3/8/24 showed [place] Grade 4+4=8; overall 12/22 + cores (4/10 + ABBY, 2 cores total with GG4). PSMA PET on 12/11/23 showed local prostate recurrence. He completed whole gland re-irradiation (30Gy whole gland, 36.25Gy boost to dominant lesion, in 5 fractions, completed 8/23/2024) with 6m ADT (Orgovyx).    He was initially diagnosed with high risk prostate adenocarcinoma (iPSA 11, [place] 4+4, cN0M0 by conventional scans) diagnosed in 06/2014, treated with 18m ADT (planned for 24m, stopped due to weight gain and hypertension) and definitive radiation to the prostate (79.2Gy in 44 fractions, completed 1/13/2015). PSA julieta was 0.26; BCR in 2022 with PSA 3.    His PSA is <0.1.  He has recovered well from side effects of radiation, nocturia 4x is at baseline. Discussed cutting down fluids prior to bed and using a humidifier instead of his dry mouth.  He is tolerating ADT fairly well, hot flashes being his main side effect.     PLAN:  Complete 6m Orgovyx, per Dr. Hassan  Return to clinic in 3 month's time with repeat PSA and testosterone at that time.     He knows to call with any questions or concerns in the interim.    Trisha Narvaez MD PhD  , Radiation Oncology

## 2024-11-25 NOTE — PROGRESS NOTES
Patient ID: Christiano Masters is a 74 y.o. male.  73 yo AA male with BCR after local definitive RT for HR disease (iPSA 11, Sal 4+4, cN0M0 by conventional scans) diagnosed in 06/2014, treated with 18m ADT (planned for 24m, stopped due to weight gain and hypertension) and definitive radiation to the prostate (79.2Gy in 44 fractions, completed 1/13/2015).   Lost to follow up, lowest PSA was 0.26, with BCR in 02/2022 (PSA 3), PSA most recently 5.97 in 12/23 with PSMA PET/CT suggesting local prostate recurrence (left anterior TZ, SUV 5.5).  PSADT currently ~10 months. He has chronic lower back pain.   MRI and TRUS/Bx done and path now c/w GS8 disease- both side of the gland  Discussed salvage options and he is interested in radiation- has many questions about AEs and despite conversations he has had with Rad Onc it does not sound he has captured the entire data  4/18/24 patient met with Dr. Hassan and recommendation: Salvage RT and ADT for 6 months was favored.   7/19/24 started relugolix  RT started 7/25/24 and completed 8/23/24.      Subjective    HPI  Seen in follow up for prostate cancer.   He continues on relugolix.   Energy and appetite okay.   Denies cough/sob/fever. Denies chest pain.   Denies n/v.   Bowels okay.   Urine okay.   Denies pain that is new or unusual.     Objective    BSA: 2.23 meters squared  /76 (BP Location: Left arm, Patient Position: Sitting, BP Cuff Size: Adult)   Pulse 63   Temp 36 °C (96.8 °F) (Temporal)   Resp 20   Wt 97.6 kg (215 lb 2.7 oz)   SpO2 95%   BMI 29.18 kg/m²      Physical Exam  Vitals reviewed.   Constitutional:       General: He is not in acute distress.  Eyes:      General: No scleral icterus.  Cardiovascular:      Rate and Rhythm: Normal rate and regular rhythm.   Pulmonary:      Effort: Pulmonary effort is normal.      Breath sounds: Normal breath sounds.   Abdominal:      General: Bowel sounds are normal. There is no distension.      Palpations: Abdomen is  soft.      Tenderness: There is no abdominal tenderness.   Musculoskeletal:      Comments: Trace lower leg edema bilaterally  Ambulates independently    Skin:     General: Skin is warm and dry.   Neurological:      Mental Status: He is alert and oriented to person, place, and time.   Psychiatric:         Mood and Affect: Mood normal.         Behavior: Behavior normal.         Performance Status:  Asymptomatic    Lab Results   Component Value Date    PSA <0.10 11/22/2024    PSA 0.17 08/28/2024    PSA 6.65 (H) 07/19/2024     Lab Results   Component Value Date    WBC 3.7 (L) 11/22/2024    HGB 12.1 (L) 11/22/2024    HCT 37.6 (L) 11/22/2024    MCV 84 11/22/2024     11/22/2024     Lab Results   Component Value Date    GLUCOSE 121 (H) 11/22/2024    CALCIUM 9.3 11/22/2024     11/22/2024    K 3.6 11/22/2024    CO2 34 (H) 11/22/2024    CL 99 11/22/2024    BUN 22 11/22/2024    CREATININE 1.29 11/22/2024     Lab Results   Component Value Date    TESTOSTERONE <30 (L) 11/22/2024     Lab Results   Component Value Date    ALT 12 11/22/2024    AST 19 11/22/2024     (H) 05/19/2022    ALKPHOS 77 11/22/2024    BILITOT 0.6 11/22/2024        Assessment/Plan   PSA undetectable   Relugolix, plan for 6 mos. Started it 7/2024. Will complete 6 mos of therapy in January.   Follow up in 3 mos with labs.  Encouraged exercise.   Encouraged him to follow up with PCP for health maintenance.     Cancer Staging   Malignant neoplasm of prostate (Multi)  Staging form: Prostate, AJCC 8th Edition  - Clinical stage from 6/11/2014: Stage IIC (cT1c, cN0, cM0, PSA: 11, Grade Group: 4) - Signed by Trisha Narvaez MD PhD on 2/6/2024      Oncology History   Malignant neoplasm of prostate (Multi)   6/11/2014 Cancer Staged    Staging form: Prostate, AJCC 8th Edition, Clinical stage from 6/11/2014: Stage IIC (cT1c, cN0, cM0, PSA: 11, Grade Group: 4) - Signed by Trisha Narvaez MD PhD on 2/6/2024 2/6/2024 Initial Diagnosis    Malignant neoplasm  of prostate (CMS/[facility])          Diagnoses and all orders for this visit:  Prostate cancer (Multi)  -     Clinic Appointment Request CIRILO BERRY (or ignacio)  -     Clinic Appointment Request KENNETH SMITH; Future  -     CBC and Auto Differential; Future  -     Comprehensive Metabolic Panel; Future  -     Prostate Specific Antigen; Future  -     Testosterone; Future           MARCIE Lopez-CNP

## 2024-11-29 ENCOUNTER — OFFICE VISIT (OUTPATIENT)
Dept: HEMATOLOGY/ONCOLOGY | Facility: HOSPITAL | Age: 74
End: 2024-11-29
Payer: MEDICARE

## 2024-11-29 VITALS
TEMPERATURE: 96.8 F | WEIGHT: 215.17 LBS | SYSTOLIC BLOOD PRESSURE: 140 MMHG | OXYGEN SATURATION: 95 % | RESPIRATION RATE: 20 BRPM | DIASTOLIC BLOOD PRESSURE: 76 MMHG | BODY MASS INDEX: 29.18 KG/M2 | HEART RATE: 63 BPM

## 2024-11-29 DIAGNOSIS — C61 PROSTATE CANCER (MULTI): ICD-10-CM

## 2024-11-29 PROCEDURE — 1036F TOBACCO NON-USER: CPT | Performed by: NURSE PRACTITIONER

## 2024-11-29 PROCEDURE — 1159F MED LIST DOCD IN RCRD: CPT | Performed by: NURSE PRACTITIONER

## 2024-11-29 PROCEDURE — 3078F DIAST BP <80 MM HG: CPT | Performed by: NURSE PRACTITIONER

## 2024-11-29 PROCEDURE — 99214 OFFICE O/P EST MOD 30 MIN: CPT | Performed by: NURSE PRACTITIONER

## 2024-11-29 PROCEDURE — 1126F AMNT PAIN NOTED NONE PRSNT: CPT | Performed by: NURSE PRACTITIONER

## 2024-11-29 PROCEDURE — 3077F SYST BP >= 140 MM HG: CPT | Performed by: NURSE PRACTITIONER

## 2024-11-29 ASSESSMENT — PAIN SCALES - GENERAL: PAINLEVEL_OUTOF10: 0-NO PAIN

## 2024-12-16 ENCOUNTER — APPOINTMENT (OUTPATIENT)
Dept: UROLOGY | Facility: HOSPITAL | Age: 74
End: 2024-12-16
Payer: MEDICARE

## 2024-12-16 ENCOUNTER — TELEMEDICINE (OUTPATIENT)
Dept: UROLOGY | Facility: HOSPITAL | Age: 74
End: 2024-12-16
Payer: MEDICARE

## 2024-12-16 DIAGNOSIS — R39.9 LOWER URINARY TRACT SYMPTOMS (LUTS): ICD-10-CM

## 2024-12-16 DIAGNOSIS — C61 PROSTATE CANCER (MULTI): Primary | ICD-10-CM

## 2024-12-16 PROCEDURE — G2211 COMPLEX E/M VISIT ADD ON: HCPCS | Performed by: STUDENT IN AN ORGANIZED HEALTH CARE EDUCATION/TRAINING PROGRAM

## 2024-12-16 PROCEDURE — 99213 OFFICE O/P EST LOW 20 MIN: CPT | Performed by: STUDENT IN AN ORGANIZED HEALTH CARE EDUCATION/TRAINING PROGRAM

## 2024-12-16 NOTE — PROGRESS NOTES
"UROLOGIC FOLLOW-UP VISIT     PROBLEM LIST:  No diagnosis found.         HISTORY OF PRESENT ILLNESS:   73 y/o male with recurrent prostate cancer after radiation therapy. Patient underwent spaceoar hydrogel placement on 5/10/24 and had persistent perineal pain. This has since subsided. He started RT on 7/25/24 and completed 8/23/24. 7/19/24 started relugolix for 6 months. He presents for a follow up today. Post-void residual today is 171 cc.     Patient denies any pushing or straining to urinate. NTF 2-3 times per night which does bothers him, and admits that he has not cut down his liquid intake at night as dicussed.  He utilizes 0.8mg of Flomax daily which helps with his urine stream.     Denies any fevers, chills, nausea, vomiting.    PAST MEDICAL HISTORY:  Past Medical History:   Diagnosis Date    A-fib (Multi)     on Eliquis, pre surgery stop instructions and clearance in 2/9/24 \"letters\"    Acute hepatitis C     Genotype 1a, s/p Harvoni [id number]    Anxiety     ASCVD (arteriosclerotic cardiovascular disease)     Benign prostatic hyperplasia with lower urinary tract symptoms     Chronic pain disorder     back    CKD (chronic kidney disease)     CKD (chronic kidney disease)     Cluster headache, chronic     Elevated PSA 12/21/2023    5.97    GERD (gastroesophageal reflux disease)     Hepatitis B     + antibody    Hyperaldosteronism     Hypercholesteremia     Hypertension     Mitral valve insufficiency     Monoclonal gammopathy     MGUS followed by Hem/Onc Dr. Ochoa    JEFFREY (obstructive sleep apnea)     Prostate cancer (Multi)     RT and ADT [id number]    Tobacco use disorder        PAST SURGICAL HISTORY:  Past Surgical History:   Procedure Laterality Date    CARDIOVERSION  12/2022    and Ablation for A Fib    COLONOSCOPY  08/26/2019    Colonoscopy    HERNIA REPAIR  11/15/2021    Hernia repair    PROSTATE BIOPSY  06/11/2014        ALLERGIES:   No Known Allergies     MEDICATIONS:     Current Outpatient Medications: "     acetaminophen (Tylenol 8 HOUR) 650 mg ER tablet, Take 1 tablet (650 mg) by mouth every 8 hours if needed for mild pain (1 - 3). Do not crush, chew, or split. (Patient not taking: Reported on 11/29/2024), Disp: , Rfl:     amLODIPine (Norvasc) 10 mg tablet, Take 1 tablet (10 mg) by mouth once daily., Disp: 90 tablet, Rfl: 1    losartan-hydrochlorothiazide (Hyzaar) 100-12.5 mg tablet, TAKE 1 TABLET BY MOUTH EVERY DAY, Disp: 90 tablet, Rfl: 1    magnesium 200 mg tablet, Take 1 tablet (200 mg) by mouth once daily., Disp: , Rfl:     metoprolol tartrate (Lopressor) 25 mg tablet, Take 1 tablet (25 mg) by mouth 2 times a day., Disp: 180 tablet, Rfl: 3    multivitamin tablet, Take 1 tablet by mouth once daily., Disp: , Rfl:     omega 3-dha-epa-fish oil (Fish OiL) 1,000 mg (120 mg-180 mg) capsule, Take 1 capsule (1,000 mg) by mouth once daily. (Patient not taking: Reported on 11/29/2024), Disp: , Rfl:     pravastatin (Pravachol) 40 mg tablet, Take 1 tablet (40 mg) by mouth once daily at bedtime., Disp: 30 tablet, Rfl: 11    relugolix (Orgovyx) 120 mg tablet, Take 1 tablet (120 mg total) by mouth once daily.  Fill 2nd - Maintenance Dose - plan for 6 months of total treatment, Disp: 30 tablet, Rfl: 4    tamsulosin (Flomax) 0.4 mg 24 hr capsule, Take 2 capsules (0.8 mg) by mouth once daily., Disp: 180 capsule, Rfl: 2      SOCIAL HISTORY:  Patient  reports that he has quit smoking. His smoking use included cigarettes. He has never used smokeless tobacco. He reports that he does not drink alcohol and does not use drugs.   Social History     Socioeconomic History    Marital status:      Spouse name: Not on file    Number of children: Not on file    Years of education: Not on file    Highest education level: Not on file   Occupational History    Not on file   Tobacco Use    Smoking status: Former     Current packs/day: 20.00     Types: Cigarettes    Smokeless tobacco: Never   Vaping Use    Vaping status: Never Used    Substance and Sexual Activity    Alcohol use: Never    Drug use: Never    Sexual activity: Yes   Other Topics Concern    Not on file   Social History Narrative    Not on file     Social Drivers of Health     Financial Resource Strain: Not on file   Food Insecurity: No Food Insecurity (6/7/2024)    Hunger Vital Sign     Worried About Running Out of Food in the Last Year: Never true     Ran Out of Food in the Last Year: Never true   Transportation Needs: Not on file   Physical Activity: Not on file   Stress: Not on file   Social Connections: Not on file   Intimate Partner Violence: Not on file   Housing Stability: Not on file       FAMILY HISTORY:  Family History   Family history unknown: Yes     REVIEW OF SYSTEMS:   Constitutional: Negative for fever and chills. Denies anorexia, weight loss.  Eyes: Negative for visual disturbance.   Respiratory: Negative for shortness of breath.    Cardiovascular: Negative for chest pain.   Gastrointestinal: Negative for nausea and vomiting.   Genitourinary: See interval history above.  Skin: Negative for rash.   Neurological: Negative for dizziness and numbness.   Psychiatric/Behavioral: Negative for confusion and decreased concentration.     PHYSICAL EXAM:  There were no vitals taken for this visit.  Constitutional: Patient appears well-developed and well-nourished. No distress.    Head: Normocephalic and atraumatic.    Neck: Normal range of motion.    Cardiovascular: Normal rate.    Pulmonary/Chest: Effort normal. No respiratory distress.   Abdominal: soft NTND  Musculoskeletal: Normal range of motion.    Neurological: Alert and oriented to person, place, and time.  Psychiatric: Normal mood and affect. Behavior is normal. Thought content normal.      LABORATORY REVIEW:     Lab Results   Component Value Date    BUN 22 11/22/2024    CREATININE 1.29 11/22/2024    EGFR 58 (L) 11/22/2024     11/22/2024    K 3.6 11/22/2024    CL 99 11/22/2024    CO2 34 (H) 11/22/2024    CALCIUM  9.3 11/22/2024      Lab Results   Component Value Date    WBC 3.7 (L) 11/22/2024    RBC 4.47 (L) 11/22/2024    HGB 12.1 (L) 11/22/2024    HCT 37.6 (L) 11/22/2024    MCV 84 11/22/2024    MCH 27.1 11/22/2024    MCHC 32.2 11/22/2024    RDW 13.6 11/22/2024     11/22/2024        Lab Results   Component Value Date    PSA <0.10 11/22/2024    PSA 0.17 08/28/2024    PSA 6.65 (H) 07/19/2024    PSA 6.28 (H) 04/18/2024    PSA 5.97 (H) 12/21/2023    PSA 3.46 08/17/2023    PSA 2.84 05/22/2023    PSA 3.02 02/22/2023    PSA 1.93 07/07/2022    PSA 1.75 05/19/2022    PSA 1.52 12/20/2021    PSA 0.47 06/17/2021    PSA 0.24 12/15/2020    PSA 0.37 06/15/2020    PSA 0.34 09/17/2019    PSA 0.26 12/08/2017         Assessment:      No diagnosis found.         Plan:   We discussed his presentation in detail.  Reviewed and interpreted patient's PSA trend  Discussed with patient that his PVR is elevated and indicates incomplete emptying. PVR today is 171 ml.   I advised him to reduce his fluid intake at night.  We also discussed the available treatment options moving forward, including surgical interventions.  Will continue on 0.8mg of flomax   RTC in 4-6 weeks for sx reassessment    35  minutes total spent on patient's care today; >50% time spent on counseling/coordination of care      Scribe Attestation  By signing my name below, I, Marj Blood Scrrobinson   attest that this documentation has been prepared under the direction and in the presence of Dima Meyer MD.

## 2024-12-16 NOTE — PROGRESS NOTES
"UROLOGIC FOLLOW-UP VISIT     PROBLEM LIST:  1. Prostate cancer (Multi)        2. Lower urinary tract symptoms (LUTS)            HISTORY OF PRESENT ILLNESS:   75 y/o male with recurrent prostate cancer after radiation therapy. Patient underwent spaceoar hydrogel placement on 5/10/24 and had persistent perineal pain. This has since subsided. He started RT on 7/25/24 and completed 8/23/24. 7/19/24 started relugolix for 6 months. He presents for a follow up today. Post-void residual today is 171 cc.     Interim History  9/30/24: Denies pushing or straining to void and states he feels he occasionally experiences incomplete emptying. Patient denies any pushing or straining to urinate. NTF 4-5 times per night.  Post-void residual today is 147 cc. He utilizes 0.4mg of Flomax daily. He does take this either first thing in the morning or mid day. Denies any fevers, chills, nausea, vomiting.    11/4/24: Patient denies any pushing or straining to urinate. NTF 2-3 times per night which does bothers him, and admits that he has not cut down his liquid intake at night as dicussed.  He utilizes 0.8mg of Flomax daily which helps with his urine stream.   Denies any fevers, chills, nausea, vomiting.    12/16/24: This is a telehealth visit. Today, he reports doing well. Notes improvement in NTF. Has been adherent to Flomax as directed. He denies any fevers, chills, nausea, vomiting.    PAST MEDICAL HISTORY:  Past Medical History:   Diagnosis Date    A-fib (Multi)     on Eliquis, pre surgery stop instructions and clearance in 2/9/24 \"letters\"    Acute hepatitis C     Genotype 1a, s/p Harvoni [id number]    Anxiety     ASCVD (arteriosclerotic cardiovascular disease)     Benign prostatic hyperplasia with lower urinary tract symptoms     Chronic pain disorder     back    CKD (chronic kidney disease)     CKD (chronic kidney disease)     Cluster headache, chronic     Elevated PSA 12/21/2023    5.97    GERD (gastroesophageal reflux disease)  "    Hepatitis B     + antibody    Hyperaldosteronism     Hypercholesteremia     Hypertension     Mitral valve insufficiency     Monoclonal gammopathy     MGUS followed by Hem/Onc Dr. Ochoa    JEFFREY (obstructive sleep apnea)     Prostate cancer (Multi)     RT and ADT [id number]    Tobacco use disorder        PAST SURGICAL HISTORY:  Past Surgical History:   Procedure Laterality Date    CARDIOVERSION  12/2022    and Ablation for A Fib    COLONOSCOPY  08/26/2019    Colonoscopy    HERNIA REPAIR  11/15/2021    Hernia repair    PROSTATE BIOPSY  06/11/2014        ALLERGIES:   No Known Allergies     MEDICATIONS:     Current Outpatient Medications:     acetaminophen (Tylenol 8 HOUR) 650 mg ER tablet, Take 1 tablet (650 mg) by mouth every 8 hours if needed for mild pain (1 - 3). Do not crush, chew, or split. (Patient not taking: Reported on 11/29/2024), Disp: , Rfl:     amLODIPine (Norvasc) 10 mg tablet, Take 1 tablet (10 mg) by mouth once daily., Disp: 90 tablet, Rfl: 1    losartan-hydrochlorothiazide (Hyzaar) 100-12.5 mg tablet, TAKE 1 TABLET BY MOUTH EVERY DAY, Disp: 90 tablet, Rfl: 1    magnesium 200 mg tablet, Take 1 tablet (200 mg) by mouth once daily., Disp: , Rfl:     metoprolol tartrate (Lopressor) 25 mg tablet, Take 1 tablet (25 mg) by mouth 2 times a day., Disp: 180 tablet, Rfl: 3    multivitamin tablet, Take 1 tablet by mouth once daily., Disp: , Rfl:     omega 3-dha-epa-fish oil (Fish OiL) 1,000 mg (120 mg-180 mg) capsule, Take 1 capsule (1,000 mg) by mouth once daily. (Patient not taking: Reported on 11/29/2024), Disp: , Rfl:     pravastatin (Pravachol) 40 mg tablet, Take 1 tablet (40 mg) by mouth once daily at bedtime., Disp: 30 tablet, Rfl: 11    relugolix (Orgovyx) 120 mg tablet, Take 1 tablet (120 mg total) by mouth once daily.  Fill 2nd - Maintenance Dose - plan for 6 months of total treatment, Disp: 30 tablet, Rfl: 4    tamsulosin (Flomax) 0.4 mg 24 hr capsule, Take 2 capsules (0.8 mg) by mouth once daily.,  Disp: 180 capsule, Rfl: 2      SOCIAL HISTORY:  Patient  reports that he has quit smoking. His smoking use included cigarettes. He has never used smokeless tobacco. He reports that he does not drink alcohol and does not use drugs.   Social History     Socioeconomic History    Marital status:      Spouse name: Not on file    Number of children: Not on file    Years of education: Not on file    Highest education level: Not on file   Occupational History    Not on file   Tobacco Use    Smoking status: Former     Current packs/day: 20.00     Types: Cigarettes    Smokeless tobacco: Never   Vaping Use    Vaping status: Never Used   Substance and Sexual Activity    Alcohol use: Never    Drug use: Never    Sexual activity: Yes   Other Topics Concern    Not on file   Social History Narrative    Not on file     Social Drivers of Health     Financial Resource Strain: Not on file   Food Insecurity: No Food Insecurity (6/7/2024)    Hunger Vital Sign     Worried About Running Out of Food in the Last Year: Never true     Ran Out of Food in the Last Year: Never true   Transportation Needs: Not on file   Physical Activity: Not on file   Stress: Not on file   Social Connections: Not on file   Intimate Partner Violence: Not on file   Housing Stability: Not on file       FAMILY HISTORY:  Family History   Family history unknown: Yes     REVIEW OF SYSTEMS:   Constitutional: Negative for fever and chills. Denies anorexia, weight loss.  Eyes: Negative for visual disturbance.   Respiratory: Negative for shortness of breath.    Cardiovascular: Negative for chest pain.   Gastrointestinal: Negative for nausea and vomiting.   Genitourinary: See interval history above.  Skin: Negative for rash.   Neurological: Negative for dizziness and numbness.   Psychiatric/Behavioral: Negative for confusion and decreased concentration.     PHYSICAL EXAM:  There were no vitals taken for this visit.  Constitutional: Patient appears well-developed and  well-nourished. No distress.    Head: Normocephalic and atraumatic.    Neck: Normal range of motion.    Cardiovascular: Normal rate.    Pulmonary/Chest: Effort normal. No respiratory distress.   Abdominal: soft NTND  Musculoskeletal: Normal range of motion.    Neurological: Alert and oriented to person, place, and time.  Psychiatric: Normal mood and affect. Behavior is normal. Thought content normal.      LABORATORY REVIEW:     Lab Results   Component Value Date    BUN 22 11/22/2024    CREATININE 1.29 11/22/2024    EGFR 58 (L) 11/22/2024     11/22/2024    K 3.6 11/22/2024    CL 99 11/22/2024    CO2 34 (H) 11/22/2024    CALCIUM 9.3 11/22/2024      Lab Results   Component Value Date    WBC 3.7 (L) 11/22/2024    RBC 4.47 (L) 11/22/2024    HGB 12.1 (L) 11/22/2024    HCT 37.6 (L) 11/22/2024    MCV 84 11/22/2024    MCH 27.1 11/22/2024    MCHC 32.2 11/22/2024    RDW 13.6 11/22/2024     11/22/2024        Lab Results   Component Value Date    PSA <0.10 11/22/2024    PSA 0.17 08/28/2024    PSA 6.65 (H) 07/19/2024    PSA 6.28 (H) 04/18/2024    PSA 5.97 (H) 12/21/2023    PSA 3.46 08/17/2023    PSA 2.84 05/22/2023    PSA 3.02 02/22/2023    PSA 1.93 07/07/2022    PSA 1.75 05/19/2022    PSA 1.52 12/20/2021    PSA 0.47 06/17/2021    PSA 0.24 12/15/2020    PSA 0.37 06/15/2020    PSA 0.34 09/17/2019    PSA 0.26 12/08/2017         Assessment:     1. Prostate cancer (Multi)        2. Lower urinary tract symptoms (LUTS)          Plan:   We discussed his presentation in detail.  Reviewed and interpreted patient's PSA trend which remains undetectable   Continue to restrict fluid intake at night.  Will continue on 0.8mg of flomax   RTC in 3 months     28  minutes total spent on patient's care today; >50% time spent on counseling/coordination of care      Scribe Attestation  By signing my name below, IMarj Scribe attest that this documentation has been prepared under the direction and in the presence of Dima ESPINAL  MD Mitch.

## 2025-01-07 ENCOUNTER — APPOINTMENT (OUTPATIENT)
Dept: PRIMARY CARE | Facility: CLINIC | Age: 75
End: 2025-01-07
Payer: MEDICARE

## 2025-01-07 PROBLEM — I48.20 CHRONIC ATRIAL FIBRILLATION, UNSPECIFIED (MULTI): Status: RESOLVED | Noted: 2022-12-19 | Resolved: 2025-01-07

## 2025-01-07 PROBLEM — Z92.89 HISTORY OF SLEEP STUDY: Status: RESOLVED | Noted: 2024-02-08 | Resolved: 2025-01-07

## 2025-01-07 PROBLEM — Z98.890 H/O COLONOSCOPY: Status: RESOLVED | Noted: 2024-02-08 | Resolved: 2025-01-07

## 2025-01-07 PROBLEM — H66.90 EAR INFECTION: Status: RESOLVED | Noted: 2024-02-08 | Resolved: 2025-01-07

## 2025-01-07 PROBLEM — I48.91 UNSPECIFIED ATRIAL FIBRILLATION (MULTI): Status: RESOLVED | Noted: 2024-02-08 | Resolved: 2025-01-07

## 2025-01-07 PROBLEM — I48.19 PERSISTENT ATRIAL FIBRILLATION (MULTI): Status: RESOLVED | Noted: 2024-02-08 | Resolved: 2025-01-07

## 2025-01-07 PROBLEM — C61 PROSTATE CANCER (MULTI): Status: RESOLVED | Noted: 2024-02-08 | Resolved: 2025-01-07

## 2025-01-07 PROBLEM — C44.92 SCC (SQUAMOUS CELL CARCINOMA): Status: RESOLVED | Noted: 2024-02-08 | Resolved: 2025-01-07

## 2025-01-07 PROBLEM — R69 DISEASE SUSPECTED: Status: RESOLVED | Noted: 2024-02-08 | Resolved: 2025-01-07

## 2025-01-07 NOTE — PROGRESS NOTES
Subjective   Patient ID:   Christiano Masters is a 74 y.o. male who presents for No chief complaint on file..  HPI  New patient here today to establish care with myself.  Last PCP:  Last seen:    Prostate cancer:  Had treatment about 10 years ago.  Following with oncology and urology.    A-fib/HTN:  Following with cardiology.  Seeing cardiology on 1/16/25.  Taking Eliquis?  Also on Amlodipine, Metoprolol.  BP today is    Hep C:  Had treatment in the past.    HLD:  Taking Pravastatin.  Last checked July 2024.    Health maintenance:  Smoking:  PSA (50+): Nov 2024  Labs: Nov 2024.  Colonoscopy (50-75): 2023  Prevnar 13 (65+): 2015  Pneumovax 23 (65+, 1 year after Prev 13): 2019  Influenza:  Shingrix (2 doses, 2-6 months apart):    Review of Systems  12 point review of systems negative unless stated above in HPI    There were no vitals filed for this visit.    Physical Exam  General: Alert and oriented, well nourished, no acute distress.  Lungs: Clear to auscultation, non-labored respiration.  Heart: Normal rate, regular rhythm, no murmur, gallop or edema.  Neurologic: Awake, alert, and oriented X3, CN II-XII intact.  Psychiatric: Cooperative, appropriate mood and affect.    Assessment/Plan   Diagnoses and all orders for this visit:  Adenocarcinoma of prostate (Multi)  Longstanding persistent atrial fibrillation (Multi)  Atrial flutter, unspecified type (Multi)  Chronic hepatitis C without hepatic coma (Multi)  Primary hypertension  Hypercholesterolemia  Malignant neoplasm of prostate (Multi)  JEFFREY (obstructive sleep apnea)

## 2025-01-07 NOTE — PROGRESS NOTES
Subjective   Patient ID:   Christiano Masters is a 74 y.o. male who presents for Establish Care and Hypotension.  HPI  New patient here today to establish care with myself.  Last PCP: N/A  Last seen:    Prostate cancer:  Had treatment about 10 years ago.  Following with oncology and urology.    A-fib/HTN:  Following with cardiology.  Seeing cardiology on 1/16/25.  Taking Amlodipine, Losartan-hydrochlorothiazide, Metoprolol.  BP today is 130/72.    Hep C:  Had treatment in the past.    HLD:  Taking Pravastatin.  Last checked July 2024.    Health maintenance:  Smoking: Former smoker.  PSA (50+): Nov 2024  Labs: Nov 2024.  Colonoscopy (50-75): 2023  Prevnar 13 (65+): 2015  Pneumovax 23 (65+, 1 year after Prev 13): 2019  Influenza:  Shingrix (2 doses, 2-6 months apart):    Review of Systems  12 point review of systems negative unless stated above in HPI    Vitals:    01/13/25 1138   BP: 130/72   Pulse: 64   SpO2: 98%     Physical Exam  General: Alert and oriented, well nourished, no acute distress.  Lungs: Clear to auscultation, non-labored respiration.  Heart: Normal rate, regular rhythm, no murmur, gallop or edema.  Neurologic: Awake, alert, and oriented X3, CN II-XII intact.  Psychiatric: Cooperative, appropriate mood and affect.    Assessment/Plan   It was nice meeting you!  BP looks to be stable today.  Consider pain management for the back pain.  I did send in Ibuprofen for you.  Continue specialty care.  If symptoms persist or worsen despite current plan of care, please contact your healthcare provider for further evaluation.  Patient instructed to contact the office if there are any questions regarding their care or treatment.   [facility] [phone]  Continue the same medications.  Chronic conditions are stable.  Call with questions or concerns.    Follow up  6 months for Medicare wellness  Diagnoses and all orders for this visit:  Atrial fibrillation, unspecified type  (Multi)  Hypertension, unspecified type  Prostate cancer (Multi)  Hyperlipidemia, unspecified hyperlipidemia type  Sleep apnea, unspecified type  Chronic atrial fibrillation (Multi)  Longstanding persistent atrial fibrillation (Multi)  Adenocarcinoma of prostate (Multi)  Atrial flutter, unspecified type (Multi)  Episodic atrial fibrillation (Multi)  Chronic hepatitis C without hepatic coma (Multi)  Malignant neoplasm of prostate (Multi)  Chronic bilateral low back pain without sciatica  -     ibuprofen 800 mg tablet; Take 1 tablet (800 mg) by mouth 3 times a day as needed for mild pain (1 - 3) (pain).

## 2025-01-09 ENCOUNTER — APPOINTMENT (OUTPATIENT)
Dept: PRIMARY CARE | Facility: CLINIC | Age: 75
End: 2025-01-09
Payer: MEDICARE

## 2025-01-09 DIAGNOSIS — I48.92 ATRIAL FLUTTER, UNSPECIFIED TYPE (MULTI): ICD-10-CM

## 2025-01-09 DIAGNOSIS — I10 PRIMARY HYPERTENSION: ICD-10-CM

## 2025-01-09 DIAGNOSIS — E78.00 HYPERCHOLESTEROLEMIA: ICD-10-CM

## 2025-01-09 DIAGNOSIS — B18.2 CHRONIC HEPATITIS C WITHOUT HEPATIC COMA (MULTI): ICD-10-CM

## 2025-01-09 DIAGNOSIS — C61 ADENOCARCINOMA OF PROSTATE (MULTI): Primary | ICD-10-CM

## 2025-01-09 DIAGNOSIS — I48.11 LONGSTANDING PERSISTENT ATRIAL FIBRILLATION (MULTI): ICD-10-CM

## 2025-01-09 DIAGNOSIS — C61 MALIGNANT NEOPLASM OF PROSTATE (MULTI): ICD-10-CM

## 2025-01-09 DIAGNOSIS — G47.33 OSA (OBSTRUCTIVE SLEEP APNEA): ICD-10-CM

## 2025-01-13 ENCOUNTER — OFFICE VISIT (OUTPATIENT)
Dept: PRIMARY CARE | Facility: CLINIC | Age: 75
End: 2025-01-13
Payer: MEDICARE

## 2025-01-13 VITALS
DIASTOLIC BLOOD PRESSURE: 72 MMHG | SYSTOLIC BLOOD PRESSURE: 130 MMHG | WEIGHT: 213 LBS | HEART RATE: 64 BPM | BODY MASS INDEX: 28.89 KG/M2 | OXYGEN SATURATION: 98 %

## 2025-01-13 DIAGNOSIS — B18.2 CHRONIC HEPATITIS C WITHOUT HEPATIC COMA (MULTI): ICD-10-CM

## 2025-01-13 DIAGNOSIS — I48.11 LONGSTANDING PERSISTENT ATRIAL FIBRILLATION (MULTI): ICD-10-CM

## 2025-01-13 DIAGNOSIS — C61 MALIGNANT NEOPLASM OF PROSTATE (MULTI): ICD-10-CM

## 2025-01-13 DIAGNOSIS — I48.0 EPISODIC ATRIAL FIBRILLATION (MULTI): ICD-10-CM

## 2025-01-13 DIAGNOSIS — G47.30 SLEEP APNEA, UNSPECIFIED TYPE: ICD-10-CM

## 2025-01-13 DIAGNOSIS — C61 ADENOCARCINOMA OF PROSTATE (MULTI): ICD-10-CM

## 2025-01-13 DIAGNOSIS — I48.92 ATRIAL FLUTTER, UNSPECIFIED TYPE (MULTI): ICD-10-CM

## 2025-01-13 DIAGNOSIS — M54.50 CHRONIC BILATERAL LOW BACK PAIN WITHOUT SCIATICA: ICD-10-CM

## 2025-01-13 DIAGNOSIS — G89.29 CHRONIC BILATERAL LOW BACK PAIN WITHOUT SCIATICA: ICD-10-CM

## 2025-01-13 DIAGNOSIS — I48.91 ATRIAL FIBRILLATION, UNSPECIFIED TYPE (MULTI): Primary | ICD-10-CM

## 2025-01-13 DIAGNOSIS — E78.5 HYPERLIPIDEMIA, UNSPECIFIED HYPERLIPIDEMIA TYPE: ICD-10-CM

## 2025-01-13 DIAGNOSIS — C61 PROSTATE CANCER (MULTI): ICD-10-CM

## 2025-01-13 DIAGNOSIS — I48.20 CHRONIC ATRIAL FIBRILLATION (MULTI): ICD-10-CM

## 2025-01-13 DIAGNOSIS — I10 HYPERTENSION, UNSPECIFIED TYPE: ICD-10-CM

## 2025-01-13 PROBLEM — E85.4 ORGAN-LIMITED AMYLOIDOSIS: Status: RESOLVED | Noted: 2023-02-27 | Resolved: 2025-01-13

## 2025-01-13 PROCEDURE — 1160F RVW MEDS BY RX/DR IN RCRD: CPT | Performed by: PHYSICIAN ASSISTANT

## 2025-01-13 PROCEDURE — 99213 OFFICE O/P EST LOW 20 MIN: CPT | Performed by: PHYSICIAN ASSISTANT

## 2025-01-13 PROCEDURE — 1036F TOBACCO NON-USER: CPT | Performed by: PHYSICIAN ASSISTANT

## 2025-01-13 PROCEDURE — G2211 COMPLEX E/M VISIT ADD ON: HCPCS | Performed by: PHYSICIAN ASSISTANT

## 2025-01-13 PROCEDURE — 1157F ADVNC CARE PLAN IN RCRD: CPT | Performed by: PHYSICIAN ASSISTANT

## 2025-01-13 PROCEDURE — 3075F SYST BP GE 130 - 139MM HG: CPT | Performed by: PHYSICIAN ASSISTANT

## 2025-01-13 PROCEDURE — 1159F MED LIST DOCD IN RCRD: CPT | Performed by: PHYSICIAN ASSISTANT

## 2025-01-13 PROCEDURE — 99203 OFFICE O/P NEW LOW 30 MIN: CPT | Performed by: PHYSICIAN ASSISTANT

## 2025-01-13 PROCEDURE — 1125F AMNT PAIN NOTED PAIN PRSNT: CPT | Performed by: PHYSICIAN ASSISTANT

## 2025-01-13 PROCEDURE — 3078F DIAST BP <80 MM HG: CPT | Performed by: PHYSICIAN ASSISTANT

## 2025-01-13 RX ORDER — IBUPROFEN 800 MG/1
800 TABLET ORAL 3 TIMES DAILY PRN
Qty: 180 TABLET | Refills: 1 | Status: SHIPPED | OUTPATIENT
Start: 2025-01-13 | End: 2025-07-12

## 2025-01-13 ASSESSMENT — PATIENT HEALTH QUESTIONNAIRE - PHQ9
1. LITTLE INTEREST OR PLEASURE IN DOING THINGS: NOT AT ALL
2. FEELING DOWN, DEPRESSED OR HOPELESS: NOT AT ALL
SUM OF ALL RESPONSES TO PHQ9 QUESTIONS 1 AND 2: 0

## 2025-01-13 ASSESSMENT — LIFESTYLE VARIABLES
HOW OFTEN DO YOU HAVE SIX OR MORE DRINKS ON ONE OCCASION: NEVER
HOW MANY STANDARD DRINKS CONTAINING ALCOHOL DO YOU HAVE ON A TYPICAL DAY: PATIENT DOES NOT DRINK
HOW OFTEN DO YOU HAVE A DRINK CONTAINING ALCOHOL: NEVER
SKIP TO QUESTIONS 9-10: 1
AUDIT-C TOTAL SCORE: 0

## 2025-01-13 ASSESSMENT — ENCOUNTER SYMPTOMS
DEPRESSION: 0
OCCASIONAL FEELINGS OF UNSTEADINESS: 0
LOSS OF SENSATION IN FEET: 0

## 2025-01-13 ASSESSMENT — PAIN SCALES - GENERAL: PAINLEVEL_OUTOF10: 5

## 2025-01-14 PROBLEM — K13.79 MOUTH PAIN: Status: RESOLVED | Noted: 2024-02-08 | Resolved: 2025-01-14

## 2025-01-14 PROBLEM — R03.1: Status: RESOLVED | Noted: 2024-02-08 | Resolved: 2025-01-14

## 2025-01-14 PROBLEM — E66.9 OBESITY, UNSPECIFIED: Status: RESOLVED | Noted: 2023-04-18 | Resolved: 2025-01-14

## 2025-01-14 PROBLEM — R19.7 ACUTE DIARRHEA: Status: RESOLVED | Noted: 2024-02-08 | Resolved: 2025-01-14

## 2025-01-14 PROBLEM — Z23 IMMUNIZATION DUE: Status: RESOLVED | Noted: 2024-02-08 | Resolved: 2025-01-14

## 2025-01-14 PROBLEM — R82.998 DARK URINE: Status: RESOLVED | Noted: 2024-02-08 | Resolved: 2025-01-14

## 2025-01-14 PROBLEM — R73.09 OTHER ABNORMAL GLUCOSE: Status: RESOLVED | Noted: 2024-02-08 | Resolved: 2025-01-14

## 2025-01-14 PROBLEM — R89.9 ABNORMAL LABORATORY TEST: Status: RESOLVED | Noted: 2024-02-08 | Resolved: 2025-01-14

## 2025-01-14 PROBLEM — J06.9 ACUTE UPPER RESPIRATORY INFECTION: Status: RESOLVED | Noted: 2024-02-08 | Resolved: 2025-01-14

## 2025-01-14 NOTE — PROGRESS NOTES
I had the pleasure seeing Christiano Masters     No chief complaint on file.     He presents for a 10 month AF follow-up.    [id number] VASc: 3 (age, htn, vasc dz)     Current Outpatient Medications   Medication Instructions    acetaminophen (TYLENOL 8 HOUR) 650 mg, Every 8 hours PRN    amLODIPine (NORVASC) 10 mg, oral, Daily    ibuprofen 800 mg, oral, 3 times daily PRN    losartan-hydrochlorothiazide (Hyzaar) 100-12.5 mg tablet 1 tablet, oral, Daily    magnesium 200 mg tablet 1 tablet, Daily    metoprolol tartrate (LOPRESSOR) 25 mg, oral, 2 times daily    multivitamin tablet 1 tablet, Daily    Orgovyx 120 mg, oral, Daily, Fill 2nd - Maintenance Dose - plan for 6 months of total treatment    pravastatin (PRAVACHOL) 40 mg, oral, Nightly    tamsulosin (FLOMAX) 0.8 mg, oral, Daily      Christiano Masters is a 74 y.o. with:     Hepatitis B and C - s/p Harvoni ([id number]) with neg VL   CAD  HTN, JEFFREY, and CKD    Prostate Cancer - s/p RT and ADT  Organ-limited Amyloidosis   Persistent AF - Newly found on EKG at PCP office (May 19, 2022). He was seen for diarrhea and discoloration in his urine. This was after a trip in [place]. Started on Metoprolol for rate control. No OAT.     AF treatment hx:  (index dx 5/19/22) started on Metoprolol.  S/p DCCV (June 2022).  Back in AF (Nov 2022).   Successful PVI by me (12/19/22) without complications. Noted to be in AF during 6week f/u.  Underwent successful DCCV (2/27/23).    May 2022: We initiated OAT and will proceed with cardioversion. Should he feel better in NSR and reverts into atrial fibrillation our next step would be to proceed with either an antiarrhythmic or PVI.     June 20, 2022: successful DCCV done by me     Nov 3, 2022: He felt much better after the cardioversion. But recently started noticing lightheadedness especially when standing up. He also has noted his blood pressure is becoming lower. He is back in atrial fibrillation.   Proceed with PVI.        Dec 19, 2022:   Underwent PVI by me without complications. Noted to be in AF during 6 wk follow up.  Underwent successful DCCV (2/27/23).    ECG 3/21/23 SB 51 bpm with normal intervals.  ECG 2/7/23 AFib     March 2024:  Eliquis stopped per pt request.        CARDIAC TESTING    -ECHO/ TTE:  (5/27/22) LVEF 60-65%.  Mild-mod TR and mod Pulmonic valve regurgitation.  Pt in AF during exam.    -ECHO/ TTE: (JULY 2015) LVEF normal. Mod TR and Pulmonic valve regurgitation.       -Cardiac MRI: (12/5/22) no MRI findings suggestive of Amyloidosis, EF 48%      Objective   Physical Exam  Constitutional: alert and in no acute distress.   Eyes: no erythema, swelling or discharge from the eye .   Neck: neck is supple, symmetric, trachea midline, no masses .   Pulmonary: no increased work of breathing or signs of respiratory distress  and lungs clear to auscultation.    Cardiovascular:  regular rhythm, normal S1 and S2, no murmurs , pedal pulses 2+ bilaterally  and no edema .   Abdomen: abdomen non-tender, no masses .   Skin: skin warm and dry, normal skin turgor .   Psychiatric judgment and insight is normal  and oriented to person, place and time .             Assessment/Plan   Hypertension  He is on amlodipine 10 mg once a day. Losartan/hydrochlorothiazide was  added in February/March of 2024 as his blood pressure was elevated. He has now noticed some low blood pressure readings in the morning but he is asymptomatic. I would not favor changing the therapies.      Persistent atrial fibrillation (CMS/HCC)  He had symptomatic persistent atrial fibrillation. S/p PVI in December of 2022 and has not had a recurrence! He however wants to stop the Eliquis. We discussed the options, his CHADS VASC is 2. He preferred to stop the Eliquis with frequent ECG monitoring as he has had no recurrence of atrial fibrillation. I did explain to him the risks of stopping the OAT, he has done well despite stopping awhile ago the OAT.  He is checking his heart rate via  the blood pressure machine.

## 2025-01-16 ENCOUNTER — OFFICE VISIT (OUTPATIENT)
Dept: CARDIOLOGY | Facility: CLINIC | Age: 75
End: 2025-01-16
Payer: MEDICARE

## 2025-01-16 VITALS
DIASTOLIC BLOOD PRESSURE: 83 MMHG | SYSTOLIC BLOOD PRESSURE: 143 MMHG | WEIGHT: 214.3 LBS | HEART RATE: 58 BPM | OXYGEN SATURATION: 99 % | HEIGHT: 72 IN | BODY MASS INDEX: 29.02 KG/M2

## 2025-01-16 DIAGNOSIS — I48.0 PAROXYSMAL ATRIAL FIBRILLATION (MULTI): Primary | ICD-10-CM

## 2025-01-16 LAB
ATRIAL RATE: 53 BPM
P AXIS: 47 DEGREES
P OFFSET: 184 MS
P ONSET: 128 MS
PR INTERVAL: 184 MS
Q ONSET: 220 MS
QRS COUNT: 8 BEATS
QRS DURATION: 102 MS
QT INTERVAL: 424 MS
QTC CALCULATION(BAZETT): 397 MS
QTC FREDERICIA: 407 MS
R AXIS: -16 DEGREES
T AXIS: 17 DEGREES
T OFFSET: 432 MS
VENTRICULAR RATE: 53 BPM

## 2025-01-16 PROCEDURE — 1036F TOBACCO NON-USER: CPT | Performed by: INTERNAL MEDICINE

## 2025-01-16 PROCEDURE — 93005 ELECTROCARDIOGRAM TRACING: CPT | Performed by: INTERNAL MEDICINE

## 2025-01-16 PROCEDURE — 1126F AMNT PAIN NOTED NONE PRSNT: CPT | Performed by: INTERNAL MEDICINE

## 2025-01-16 PROCEDURE — 1157F ADVNC CARE PLAN IN RCRD: CPT | Performed by: INTERNAL MEDICINE

## 2025-01-16 PROCEDURE — 93010 ELECTROCARDIOGRAM REPORT: CPT | Performed by: INTERNAL MEDICINE

## 2025-01-16 PROCEDURE — 99214 OFFICE O/P EST MOD 30 MIN: CPT | Performed by: INTERNAL MEDICINE

## 2025-01-16 PROCEDURE — 3079F DIAST BP 80-89 MM HG: CPT | Performed by: INTERNAL MEDICINE

## 2025-01-16 PROCEDURE — G2211 COMPLEX E/M VISIT ADD ON: HCPCS | Performed by: INTERNAL MEDICINE

## 2025-01-16 PROCEDURE — 3008F BODY MASS INDEX DOCD: CPT | Performed by: INTERNAL MEDICINE

## 2025-01-16 PROCEDURE — 3077F SYST BP >= 140 MM HG: CPT | Performed by: INTERNAL MEDICINE

## 2025-01-16 PROCEDURE — 1159F MED LIST DOCD IN RCRD: CPT | Performed by: INTERNAL MEDICINE

## 2025-01-16 RX ORDER — MOXIFLOXACIN 5 MG/ML
SOLUTION/ DROPS OPHTHALMIC
COMMUNITY
Start: 2024-12-30

## 2025-01-16 RX ORDER — CLINDAMYCIN HYDROCHLORIDE 300 MG/1
CAPSULE ORAL
COMMUNITY
Start: 2025-01-11

## 2025-01-16 RX ORDER — PREDNISOLONE ACETATE 10 MG/ML
SUSPENSION/ DROPS OPHTHALMIC
COMMUNITY
Start: 2024-12-30

## 2025-01-16 ASSESSMENT — ENCOUNTER SYMPTOMS
OCCASIONAL FEELINGS OF UNSTEADINESS: 0
DEPRESSION: 0
LOSS OF SENSATION IN FEET: 0

## 2025-01-16 ASSESSMENT — PAIN SCALES - GENERAL: PAINLEVEL_OUTOF10: 0-NO PAIN

## 2025-02-26 DIAGNOSIS — I10 HYPERTENSION, UNSPECIFIED TYPE: ICD-10-CM

## 2025-02-27 ENCOUNTER — APPOINTMENT (OUTPATIENT)
Dept: HEMATOLOGY/ONCOLOGY | Facility: HOSPITAL | Age: 75
End: 2025-02-27
Payer: MEDICARE

## 2025-02-27 RX ORDER — AMLODIPINE BESYLATE 10 MG/1
10 TABLET ORAL DAILY
Qty: 90 TABLET | Refills: 1 | Status: SHIPPED | OUTPATIENT
Start: 2025-02-27

## 2025-03-06 ENCOUNTER — TELEPHONE (OUTPATIENT)
Dept: HEMATOLOGY/ONCOLOGY | Facility: HOSPITAL | Age: 75
End: 2025-03-06
Payer: MEDICARE

## 2025-03-06 ENCOUNTER — APPOINTMENT (OUTPATIENT)
Dept: HEMATOLOGY/ONCOLOGY | Facility: HOSPITAL | Age: 75
End: 2025-03-06
Payer: MEDICARE

## 2025-03-06 NOTE — TELEPHONE ENCOUNTER
RN called patient regarding today's missed appointment with Dr. Hassan. RN received a busy signal and the line then hung up. RN unable to leave a message.   Jocelyn Dent RN 03/06/25 1:38 PM

## 2025-03-14 ENCOUNTER — OFFICE VISIT (OUTPATIENT)
Dept: HEMATOLOGY/ONCOLOGY | Facility: HOSPITAL | Age: 75
End: 2025-03-14
Payer: MEDICARE

## 2025-03-14 VITALS
BODY MASS INDEX: 30.47 KG/M2 | DIASTOLIC BLOOD PRESSURE: 71 MMHG | SYSTOLIC BLOOD PRESSURE: 134 MMHG | RESPIRATION RATE: 18 BRPM | TEMPERATURE: 98.2 F | HEART RATE: 64 BPM | WEIGHT: 224.65 LBS | OXYGEN SATURATION: 98 %

## 2025-03-14 DIAGNOSIS — C61 PROSTATE CANCER (MULTI): ICD-10-CM

## 2025-03-14 PROCEDURE — 1125F AMNT PAIN NOTED PAIN PRSNT: CPT | Performed by: INTERNAL MEDICINE

## 2025-03-14 PROCEDURE — 3078F DIAST BP <80 MM HG: CPT | Performed by: INTERNAL MEDICINE

## 2025-03-14 PROCEDURE — 3075F SYST BP GE 130 - 139MM HG: CPT | Performed by: INTERNAL MEDICINE

## 2025-03-14 PROCEDURE — 1159F MED LIST DOCD IN RCRD: CPT | Performed by: INTERNAL MEDICINE

## 2025-03-14 PROCEDURE — 99214 OFFICE O/P EST MOD 30 MIN: CPT | Performed by: INTERNAL MEDICINE

## 2025-03-14 PROCEDURE — 1157F ADVNC CARE PLAN IN RCRD: CPT | Performed by: INTERNAL MEDICINE

## 2025-03-14 ASSESSMENT — PAIN SCALES - GENERAL: PAINLEVEL_OUTOF10: 8

## 2025-03-19 DIAGNOSIS — M54.50 CHRONIC BILATERAL LOW BACK PAIN WITHOUT SCIATICA: ICD-10-CM

## 2025-03-19 DIAGNOSIS — G89.29 CHRONIC BILATERAL LOW BACK PAIN WITHOUT SCIATICA: ICD-10-CM

## 2025-03-19 DIAGNOSIS — I48.91 ATRIAL FIBRILLATION (MULTI): Primary | ICD-10-CM

## 2025-03-19 RX ORDER — IBUPROFEN 800 MG/1
TABLET ORAL
Qty: 180 TABLET | Refills: 1 | Status: SHIPPED | OUTPATIENT
Start: 2025-03-19

## 2025-03-19 RX ORDER — APIXABAN 5 MG/1
5 TABLET, FILM COATED ORAL 2 TIMES DAILY
Qty: 180 TABLET | Refills: 2 | Status: SHIPPED | OUTPATIENT
Start: 2025-03-19

## 2025-03-21 NOTE — PROGRESS NOTES
Medical Oncology Out Patient Clinic Note    Patient's Name: Christiano Masters  MRN: [id number]  Date of Service: 3/14/25  In- Person Visit: YES    Reason for Visit: FU    HPI: 73 yo AA male known to me with BCR who just completed salvage RT and 6 months of ADT with Relugolix. Initial Dx was high-risk disease (iPSA 11, Sal 4+4, cN0M0 by conventional scans) diagnosed in 06/2014, treated with 18m ADT (planned for 24m, stopped due to weight gain and hypertension) and definitive radiation to the prostate (79.2Gy in 44 fractions, completed 1/13/2015).   Lost to follow up, lowest PSA was 0.26, with BCR in 02/2022 (PSA 3), PSA most recently 5.97 in 12/23 with PSMA PET/CT suggesting local prostate recurrence (left anterior TZ, SUV 5.5).      MRI and TRUS/Bx done and path  c/w GS8 disease- both side of the gland and PET PMSA negative  Underwent salvage RT and ADT  Tawanda PSA undetectable   Just came off ADT  T remains under 30  ED, fatigue and hot flashes - all G1 and sec low ADT    Updated PMHx  None    Review of Systems  13 point review negative    Physical Exam:  /71 (BP Location: Left arm, Patient Position: Sitting)   Pulse 64   Temp 36.8 °C (98.2 °F) (Temporal)   Resp 18   Wt 102 kg (224 lb 10.4 oz)   SpO2 98%   BMI 30.47 kg/m²   ECOG Performance Status: 0  HEENT: Normal  ENT: Normal  CV: NA  Pulmonary: NA  GI: NA  : NA  Extremities: No Edema   Neurologic: Normal  Skin: Normal skin turgor  Psych: Appropriate mood      LABS:  Lab Results   Component Value Date    PSA <0.10 11/22/2024    PSA 0.17 08/28/2024    PSA 6.65 (H) 07/19/2024     Lab Results   Component Value Date    WBC 3.7 (L) 11/22/2024    HGB 12.1 (L) 11/22/2024    HCT 37.6 (L) 11/22/2024    MCV 84 11/22/2024     11/22/2024     Lab Results   Component Value Date    GLUCOSE 121 (H) 11/22/2024    CALCIUM 9.3 11/22/2024     11/22/2024    K 3.6 11/22/2024    CO2 34 (H) 11/22/2024    CL 99 11/22/2024    BUN 22 11/22/2024    CREATININE  1.29 11/22/2024     Lab Results   Component Value Date    TESTOSTERONE <30 (L) 11/22/2024     Lab Results   Component Value Date    ALT 12 11/22/2024    AST 19 11/22/2024     (H) 05/19/2022    ALKPHOS 77 11/22/2024    BILITOT 0.6 11/22/2024       IMAGING:  NA    GENOMICS:  NA    A/P: High-risk PCa s/p Salvage RT and ADT after RT  PSA undetectable yet T remains under 50 but he just came off ADT  Discussed goals of care and f/u moving forward  PSA in T in 3 months    Discussed importance of a healthier diet - offered to see Nutritional Services; Also discussed the importance of daily regular exercise (aerobic and resistance differences noted)  Offered to see Supportive Services if needed as well as integrative Oncology and Psychosocial Support    Darshan Hassan MD, FACP  Chief, Solid Tumor Oncology Division   Medical Oncology  Professor of Medicine and Urology  /[facility]

## 2025-04-06 NOTE — PROGRESS NOTES
"UROLOGIC FOLLOW-UP VISIT     PROBLEM LIST:  No diagnosis found.      HISTORY OF PRESENT ILLNESS:   75 y/o male with recurrent prostate cancer after radiation therapy. Patient underwent spaceoar hydrogel placement on 5/10/24 and had persistent perineal pain. This has since subsided. He started RT on 7/25/24 and completed 8/23/24. 7/19/24 started relugolix for 6 months. He presents for a follow up today. Post-void residual today is 171 cc.     Interim History  9/30/24: Denies pushing or straining to void and states he feels he occasionally experiences incomplete emptying. Patient denies any pushing or straining to urinate. NTF 4-5 times per night.  Post-void residual today is 147 cc. He utilizes 0.4mg of Flomax daily. He does take this either first thing in the morning or mid day. Denies any fevers, chills, nausea, vomiting.    11/4/24: Patient denies any pushing or straining to urinate. NTF 2-3 times per night which does bothers him, and admits that he has not cut down his liquid intake at night as dicussed.  He utilizes 0.8mg of Flomax daily which helps with his urine stream.   Denies any fevers, chills, nausea, vomiting.    12/16/24: This is a telehealth visit. Today, he reports doing well. Notes improvement in NTF. Has been adherent to Flomax as directed. He denies any fevers, chills, nausea, vomiting.    4/7/25: ***        PAST MEDICAL HISTORY:  Past Medical History:   Diagnosis Date    A-fib (Multi)     on Eliquis, pre surgery stop instructions and clearance in 2/9/24 \"letters\"    Acute hepatitis C     Genotype 1a, s/p Harvoni [id number]    Anxiety     ASCVD (arteriosclerotic cardiovascular disease)     Benign prostatic hyperplasia with lower urinary tract symptoms     Chronic pain disorder     back    CKD (chronic kidney disease)     CKD (chronic kidney disease)     Cluster headache, chronic     Elevated PSA 12/21/2023    5.97    GERD (gastroesophageal reflux disease)     Hepatitis B     + antibody    " Hyperaldosteronism     Hypercholesteremia     Hypertension     Mitral valve insufficiency     Monoclonal gammopathy     MGUS followed by Hem/Onc Dr. Ochoa    JEFFREY (obstructive sleep apnea)     Prostate cancer (Multi)     RT and ADT [id number]    Tobacco use disorder        PAST SURGICAL HISTORY:  Past Surgical History:   Procedure Laterality Date    CARDIOVERSION  12/2022    and Ablation for A Fib    COLONOSCOPY  08/26/2019    Colonoscopy    HERNIA REPAIR  11/15/2021    Hernia repair    PROSTATE BIOPSY  06/11/2014        ALLERGIES:   No Known Allergies     MEDICATIONS:     Current Outpatient Medications:     acetaminophen (Tylenol 8 HOUR) 650 mg ER tablet, Take 1 tablet (650 mg) by mouth every 8 hours if needed for mild pain (1 - 3). Do not crush, chew, or split., Disp: , Rfl:     amLODIPine (Norvasc) 10 mg tablet, TAKE 1 TABLET BY MOUTH EVERY DAY, Disp: 90 tablet, Rfl: 1    clindamycin (Cleocin) 300 mg capsule, TAKE 1 CAPSULE BY MOUTH EVERY 6 HOURS UNTIL FINISHED, Disp: , Rfl:     Eliquis 5 mg tablet, TAKE 1 TABLET BY MOUTH TWICE A DAY, Disp: 180 tablet, Rfl: 2    ibuprofen 800 mg tablet, TAKE 1 TABLET BY MOUTH THREE TIMES A DAY AS NEEDED FOR MILD PAIN (1 TO 3), Disp: 180 tablet, Rfl: 1    losartan-hydrochlorothiazide (Hyzaar) 100-12.5 mg tablet, TAKE 1 TABLET BY MOUTH EVERY DAY, Disp: 90 tablet, Rfl: 1    magnesium 200 mg tablet, Take 1 tablet (200 mg) by mouth once daily., Disp: , Rfl:     metoprolol tartrate (Lopressor) 25 mg tablet, Take 1 tablet (25 mg) by mouth 2 times a day., Disp: 180 tablet, Rfl: 3    moxifloxacin (Vigamox) 0.5 % ophthalmic solution, instill 1 drop 3 times a day into surgical eye for 5 days, then stop.  Begin after surgery., Disp: , Rfl:     multivitamin tablet, Take 1 tablet by mouth once daily., Disp: , Rfl:     pravastatin (Pravachol) 40 mg tablet, Take 1 tablet (40 mg) by mouth once daily at bedtime., Disp: 30 tablet, Rfl: 11    prednisoLONE acetate (Pred-Forte) 1 % ophthalmic  suspension, instill 1 drop 3 times a day into surgical eye for 2 weeks, then 2 times a day for 1 week and 1 time a day for 1  week. Then stop.  Begin after surgery., Disp: , Rfl:     relugolix (Orgovyx) 120 mg tablet, Take 1 tablet (120 mg total) by mouth once daily.  Fill 2nd - Maintenance Dose - plan for 6 months of total treatment (Patient not taking: Reported on 3/14/2025), Disp: 30 tablet, Rfl: 4    tamsulosin (Flomax) 0.4 mg 24 hr capsule, Take 2 capsules (0.8 mg) by mouth once daily., Disp: 180 capsule, Rfl: 2      SOCIAL HISTORY:  Patient  reports that he has quit smoking. His smoking use included cigarettes. He has never used smokeless tobacco. He reports that he does not drink alcohol and does not use drugs.   Social History     Socioeconomic History    Marital status:      Spouse name: Not on file    Number of children: Not on file    Years of education: Not on file    Highest education level: Not on file   Occupational History    Not on file   Tobacco Use    Smoking status: Former     Current packs/day: 20.00     Types: Cigarettes    Smokeless tobacco: Never   Vaping Use    Vaping status: Never Used   Substance and Sexual Activity    Alcohol use: Never    Drug use: Never    Sexual activity: Yes   Other Topics Concern    Not on file   Social History Narrative    Not on file     Social Drivers of Health     Financial Resource Strain: Not on file   Food Insecurity: No Food Insecurity (6/7/2024)    Hunger Vital Sign     Worried About Running Out of Food in the Last Year: Never true     Ran Out of Food in the Last Year: Never true   Transportation Needs: Not on file   Physical Activity: Not on file   Stress: Not on file   Social Connections: Not on file   Intimate Partner Violence: Not on file   Housing Stability: Not on file       FAMILY HISTORY:  Family History   Family history unknown: Yes     REVIEW OF SYSTEMS:   Constitutional: Negative for fever and chills. Denies anorexia, weight loss.  Eyes:  Negative for visual disturbance.   Respiratory: Negative for shortness of breath.    Cardiovascular: Negative for chest pain.   Gastrointestinal: Negative for nausea and vomiting.   Genitourinary: See interval history above.  Skin: Negative for rash.   Neurological: Negative for dizziness and numbness.   Psychiatric/Behavioral: Negative for confusion and decreased concentration.     PHYSICAL EXAM:  There were no vitals taken for this visit.  Constitutional: Patient appears well-developed and well-nourished. No distress.    Head: Normocephalic and atraumatic.    Neck: Normal range of motion.    Cardiovascular: Normal rate.    Pulmonary/Chest: Effort normal. No respiratory distress.   Abdominal: soft NTND  Musculoskeletal: Normal range of motion.    Neurological: Alert and oriented to person, place, and time.  Psychiatric: Normal mood and affect. Behavior is normal. Thought content normal.      LABORATORY REVIEW:     Lab Results   Component Value Date    BUN 22 11/22/2024    CREATININE 1.29 11/22/2024    EGFR 58 (L) 11/22/2024     11/22/2024    K 3.6 11/22/2024    CL 99 11/22/2024    CO2 34 (H) 11/22/2024    CALCIUM 9.3 11/22/2024      Lab Results   Component Value Date    WBC 3.7 (L) 11/22/2024    RBC 4.47 (L) 11/22/2024    HGB 12.1 (L) 11/22/2024    HCT 37.6 (L) 11/22/2024    MCV 84 11/22/2024    MCH 27.1 11/22/2024    MCHC 32.2 11/22/2024    RDW 13.6 11/22/2024     11/22/2024        Lab Results   Component Value Date    PSA <0.10 11/22/2024    PSA 0.17 08/28/2024    PSA 6.65 (H) 07/19/2024    PSA 6.28 (H) 04/18/2024    PSA 5.97 (H) 12/21/2023    PSA 3.46 08/17/2023    PSA 2.84 05/22/2023    PSA 3.02 02/22/2023    PSA 1.93 07/07/2022    PSA 1.75 05/19/2022    PSA 1.52 12/20/2021    PSA 0.47 06/17/2021    PSA 0.24 12/15/2020    PSA 0.37 06/15/2020    PSA 0.34 09/17/2019    PSA 0.26 12/08/2017         Assessment:     No diagnosis found.    Plan:   We discussed his presentation in detail.  Reviewed and  interpreted patient's PSA trend which remains undetectable   Continue to restrict fluid intake at night.  Will continue on 0.8mg of flomax   RTC in 3 months     28  minutes total spent on patient's care today; >50% time spent on counseling/coordination of care      Scribe Attestation  By signing my name below, Marj REGAN Scribe attest that this documentation has been prepared under the direction and in the presence of Dima Meyer MD.

## 2025-04-07 ENCOUNTER — APPOINTMENT (OUTPATIENT)
Dept: UROLOGY | Facility: HOSPITAL | Age: 75
End: 2025-04-07
Payer: MEDICARE

## 2025-04-14 ENCOUNTER — APPOINTMENT (OUTPATIENT)
Facility: CLINIC | Age: 75
End: 2025-04-14
Payer: MEDICARE

## 2025-04-14 VITALS
TEMPERATURE: 97.5 F | SYSTOLIC BLOOD PRESSURE: 121 MMHG | DIASTOLIC BLOOD PRESSURE: 88 MMHG | WEIGHT: 224 LBS | HEIGHT: 72 IN | BODY MASS INDEX: 30.34 KG/M2

## 2025-04-14 DIAGNOSIS — J33.9 RIGHT NASAL POLYPS: ICD-10-CM

## 2025-04-14 DIAGNOSIS — J34.89 NASAL CAVITY MASS: Primary | ICD-10-CM

## 2025-04-14 DIAGNOSIS — J30.9 CHRONIC ALLERGIC RHINITIS: ICD-10-CM

## 2025-04-14 PROCEDURE — 3079F DIAST BP 80-89 MM HG: CPT | Performed by: OTOLARYNGOLOGY

## 2025-04-14 PROCEDURE — 1159F MED LIST DOCD IN RCRD: CPT | Performed by: OTOLARYNGOLOGY

## 2025-04-14 PROCEDURE — 1036F TOBACCO NON-USER: CPT | Performed by: OTOLARYNGOLOGY

## 2025-04-14 PROCEDURE — 3008F BODY MASS INDEX DOCD: CPT | Performed by: OTOLARYNGOLOGY

## 2025-04-14 PROCEDURE — 1157F ADVNC CARE PLAN IN RCRD: CPT | Performed by: OTOLARYNGOLOGY

## 2025-04-14 PROCEDURE — 3074F SYST BP LT 130 MM HG: CPT | Performed by: OTOLARYNGOLOGY

## 2025-04-14 PROCEDURE — 99204 OFFICE O/P NEW MOD 45 MIN: CPT | Performed by: OTOLARYNGOLOGY

## 2025-04-14 RX ORDER — PREDNISONE 10 MG/1
TABLET ORAL
Qty: 51 TABLET | Refills: 0 | Status: SHIPPED | OUTPATIENT
Start: 2025-04-14

## 2025-04-14 RX ORDER — FLUTICASONE PROPIONATE 50 MCG
2 SPRAY, SUSPENSION (ML) NASAL DAILY
Qty: 48 G | Refills: 3 | Status: SHIPPED | OUTPATIENT
Start: 2025-04-14 | End: 2026-04-14

## 2025-04-14 NOTE — PROGRESS NOTES
"Impression:  1. Nasal cavity mass  CT sinus wo IV contrast volumetric surgical planning      2. Right nasal polyps  CT sinus wo IV contrast volumetric surgical planning    predniSONE (Deltasone) 10 mg tablet      3. Chronic allergic rhinitis  fluticasone (Flonase) 50 mcg/actuation nasal spray           RECOMMENDATIONS/PLAN :  We will obtain a CT scan of his sinuses with navigation protocol to better evaluate the large nasal cavity mass on that right side/nasal polyps.  Once I get those results I will call the patient and make further recommendations.  We will also place him on prednisone tapering from 60 mg to 10 mg over the next 2 weeks.  We will also restart Flonase nasal spray-2 puffs each nostril daily.      **This electronic medical record note was created with the use of voice recognition software.  Despite proofreading, typographical or grammatical errors may be present that could affect meaning of content **    Subjective   Patient ID:     Christiano Masters is a 74 y.o. male who presents to the office today complaining that he cannot breathe through his right nostril.  He can feel a mass in the right nostril with his finger.  He denies any pus draining from the sinuses or any fever or chills.  No blurred vision.  No other neurologic complaints.  No problems in the left nostril.  In the past he had used Flonase nasal spray many years ago but he stopped.  He has not had any recent sinus infections.  No trauma to the head or neck.    ROS:  A detailed 12 system review of systems is noted on the intake form has been reviewed with the patient with details noted in the HPI and scanned into the patient's medical record.    Objective     Past Medical History:   Diagnosis Date    A-fib (Multi)     on Eliquis, pre surgery stop instructions and clearance in 2/9/24 \"letters\"    Acute hepatitis C     Genotype 1a, s/p Harvoni [id number]    Anxiety     ASCVD (arteriosclerotic cardiovascular disease)     Benign prostatic " hyperplasia with lower urinary tract symptoms     Chronic pain disorder     back    CKD (chronic kidney disease)     CKD (chronic kidney disease)     Cluster headache, chronic     Elevated PSA 12/21/2023    5.97    GERD (gastroesophageal reflux disease)     Hepatitis B     + antibody    Hyperaldosteronism     Hypercholesteremia     Hypertension     Mitral valve insufficiency     Monoclonal gammopathy     MGUS followed by Hem/Onc Dr. Ochoa    JEFFREY (obstructive sleep apnea)     Prostate cancer (Multi)     RT and ADT [id number]    Tobacco use disorder         Past Surgical History:   Procedure Laterality Date    CARDIOVERSION  12/2022    and Ablation for A Fib    COLONOSCOPY  08/26/2019    Colonoscopy    HERNIA REPAIR  11/15/2021    Hernia repair    PROSTATE BIOPSY  06/11/2014        No Known Allergies       Current Outpatient Medications:     acetaminophen (Tylenol 8 HOUR) 650 mg ER tablet, Take 1 tablet (650 mg) by mouth every 8 hours if needed for mild pain (1 - 3). Do not crush, chew, or split., Disp: , Rfl:     amLODIPine (Norvasc) 10 mg tablet, TAKE 1 TABLET BY MOUTH EVERY DAY, Disp: 90 tablet, Rfl: 1    losartan-hydrochlorothiazide (Hyzaar) 100-12.5 mg tablet, TAKE 1 TABLET BY MOUTH EVERY DAY, Disp: 90 tablet, Rfl: 1    magnesium 200 mg tablet, Take 1 tablet (200 mg) by mouth once daily., Disp: , Rfl:     metoprolol tartrate (Lopressor) 25 mg tablet, Take 1 tablet (25 mg) by mouth 2 times a day., Disp: 180 tablet, Rfl: 3    multivitamin tablet, Take 1 tablet by mouth once daily., Disp: , Rfl:     tamsulosin (Flomax) 0.4 mg 24 hr capsule, Take 2 capsules (0.8 mg) by mouth once daily., Disp: 180 capsule, Rfl: 2    clindamycin (Cleocin) 300 mg capsule, TAKE 1 CAPSULE BY MOUTH EVERY 6 HOURS UNTIL FINISHED (Patient not taking: Reported on 4/14/2025), Disp: , Rfl:     Eliquis 5 mg tablet, TAKE 1 TABLET BY MOUTH TWICE A DAY (Patient not taking: Reported on 4/14/2025), Disp: 180 tablet, Rfl: 2    fluticasone (Flonase) 50  mcg/actuation nasal spray, Administer 2 sprays into each nostril once daily., Disp: 48 g, Rfl: 3    ibuprofen 800 mg tablet, TAKE 1 TABLET BY MOUTH THREE TIMES A DAY AS NEEDED FOR MILD PAIN (1 TO 3) (Patient not taking: Reported on 4/14/2025), Disp: 180 tablet, Rfl: 1    moxifloxacin (Vigamox) 0.5 % ophthalmic solution, instill 1 drop 3 times a day into surgical eye for 5 days, then stop.  Begin after surgery. (Patient not taking: Reported on 4/14/2025), Disp: , Rfl:     pravastatin (Pravachol) 40 mg tablet, Take 1 tablet (40 mg) by mouth once daily at bedtime., Disp: 30 tablet, Rfl: 11    prednisoLONE acetate (Pred-Forte) 1 % ophthalmic suspension, instill 1 drop 3 times a day into surgical eye for 2 weeks, then 2 times a day for 1 week and 1 time a day for 1  week. Then stop.  Begin after surgery. (Patient not taking: Reported on 4/14/2025), Disp: , Rfl:     predniSONE (Deltasone) 10 mg tablet, 60 mg by mouth x5 days, 40 mg by mouth x3 days, 20 mg by mouth x3 days, 10 mg by mouth x3 days, Disp: 51 tablet, Rfl: 0    relugolix (Orgovyx) 120 mg tablet, Take 1 tablet (120 mg total) by mouth once daily.  Fill 2nd - Maintenance Dose - plan for 6 months of total treatment (Patient not taking: Reported on 1/16/2025), Disp: 30 tablet, Rfl: 4     Tobacco Use: Medium Risk (4/14/2025)    Patient History     Smoking Tobacco Use: Former     Smokeless Tobacco Use: Never     Passive Exposure: Not on file        Alcohol Use: Not At Risk (1/13/2025)    AUDIT-C     Frequency of Alcohol Consumption: Never     Average Number of Drinks: Patient does not drink     Frequency of Binge Drinking: Never        Social History     Substance and Sexual Activity   Drug Use Never        Physical Exam:  Visit Vitals  /88   Temp 36.4 °C (97.5 °F) (Temporal)   Ht 1.829 m (6')   Wt 102 kg (224 lb)   BMI 30.38 kg/m²   Smoking Status Former   BSA 2.28 m²      General: Patient is alert, oriented, cooperative in no apparent distress.  Head:  Normocephalic, atraumatic.  Eyes: PERRL, EOMI, Conjunctiva is clear. No nystagmus.  Ears: Right Ear-- Pinna is normal.  External auditory canal is patent. Tympanic membrane is [intact, translucent and has good mobility with my pneumatic otoscope. No effusion].  Mastoid is nontender.  Left ear-- Pinna is normal.  External auditory canal is patent. Tympanic membrane is [intact, translucent and has good mobility with my pneumatic otoscope.  No effusion].  Mastoid is nontender.  Nose: Septum is relatively straight.  No septal perforation or lesions. No septal hematoma/ seroma.  No signs of bleeding.  Inferior turbinates are moderately swollen and obstructive. The patient has a large obstructive mass filling the right nasal cavity-- could represent extensive nasal polyps.  Throat:  Floor of mouth is clear, no masses.  Tongue appears normal, no lesions or masses. Gums, gingiva, buccal mucosa appear pink and moist, no lesions. Teeth are in fair repair.  No obvious dental infections.  Peritonsillar regions appear symmetric without swelling.  Hard and soft palate appear normal, no obvious cleft. Uvula is midline.  Oropharynx: No lesions. Retropharyngeal wall is flat.  No active postnasal drip.  Neck: Supple,  no lymphadenopathy.  No masses.  Salivary Glands: Symmetric bilaterally.  No palpable masses.  No evidence of acute infection or salivary stones  Neurologic: Cranial Nerves 2-12 are grossly intact without focal deficits. Cerebellar function testing is normal.     Results:   []    Procedure:   []    Gilmer Elizabeth DO

## 2025-04-18 ENCOUNTER — TELEPHONE (OUTPATIENT)
Facility: CLINIC | Age: 75
End: 2025-04-18
Payer: MEDICARE

## 2025-04-18 ENCOUNTER — LAB (OUTPATIENT)
Dept: LAB | Facility: HOSPITAL | Age: 75
End: 2025-04-18
Payer: MEDICARE

## 2025-04-18 DIAGNOSIS — C61 MALIGNANT NEOPLASM OF PROSTATE (MULTI): Primary | ICD-10-CM

## 2025-04-18 DIAGNOSIS — C61 PROSTATE CANCER (MULTI): ICD-10-CM

## 2025-04-18 LAB
ALBUMIN SERPL BCP-MCNC: 4.1 G/DL (ref 3.4–5)
ALP SERPL-CCNC: 77 U/L (ref 33–136)
ALT SERPL W P-5'-P-CCNC: 9 U/L (ref 10–52)
ANION GAP SERPL CALC-SCNC: 12 MMOL/L (ref 10–20)
AST SERPL W P-5'-P-CCNC: 14 U/L (ref 9–39)
BASOPHILS # BLD AUTO: 0.03 X10*3/UL (ref 0–0.1)
BASOPHILS NFR BLD AUTO: 0.8 %
BILIRUB SERPL-MCNC: 0.6 MG/DL (ref 0–1.2)
BUN SERPL-MCNC: 20 MG/DL (ref 6–23)
CALCIUM SERPL-MCNC: 9.7 MG/DL (ref 8.6–10.6)
CHLORIDE SERPL-SCNC: 101 MMOL/L (ref 98–107)
CO2 SERPL-SCNC: 32 MMOL/L (ref 21–32)
CREAT SERPL-MCNC: 1.11 MG/DL (ref 0.5–1.3)
EGFRCR SERPLBLD CKD-EPI 2021: 70 ML/MIN/1.73M*2
EOSINOPHIL # BLD AUTO: 0.1 X10*3/UL (ref 0–0.4)
EOSINOPHIL NFR BLD AUTO: 2.5 %
ERYTHROCYTE [DISTWIDTH] IN BLOOD BY AUTOMATED COUNT: 14.4 % (ref 11.5–14.5)
GLUCOSE SERPL-MCNC: 115 MG/DL (ref 74–99)
HCT VFR BLD AUTO: 38.8 % (ref 41–52)
HGB BLD-MCNC: 12.1 G/DL (ref 13.5–17.5)
HOLD SPECIMEN: NORMAL
IMM GRANULOCYTES # BLD AUTO: 0.02 X10*3/UL (ref 0–0.5)
IMM GRANULOCYTES NFR BLD AUTO: 0.5 % (ref 0–0.9)
LYMPHOCYTES # BLD AUTO: 1.3 X10*3/UL (ref 0.8–3)
LYMPHOCYTES NFR BLD AUTO: 33.1 %
MCH RBC QN AUTO: 27.3 PG (ref 26–34)
MCHC RBC AUTO-ENTMCNC: 31.2 G/DL (ref 32–36)
MCV RBC AUTO: 87 FL (ref 80–100)
MONOCYTES # BLD AUTO: 0.39 X10*3/UL (ref 0.05–0.8)
MONOCYTES NFR BLD AUTO: 9.9 %
NEUTROPHILS # BLD AUTO: 2.09 X10*3/UL (ref 1.6–5.5)
NEUTROPHILS NFR BLD AUTO: 53.2 %
NRBC BLD-RTO: 0 /100 WBCS (ref 0–0)
PLATELET # BLD AUTO: 302 X10*3/UL (ref 150–450)
POTASSIUM SERPL-SCNC: 3.6 MMOL/L (ref 3.5–5.3)
PROT SERPL-MCNC: 7 G/DL (ref 6.4–8.2)
PSA SERPL-MCNC: 0.23 NG/ML
RBC # BLD AUTO: 4.44 X10*6/UL (ref 4.5–5.9)
SODIUM SERPL-SCNC: 141 MMOL/L (ref 136–145)
TESTOST SERPL-MCNC: 422 NG/DL (ref 240–1000)
WBC # BLD AUTO: 3.9 X10*3/UL (ref 4.4–11.3)

## 2025-04-18 PROCEDURE — 84403 ASSAY OF TOTAL TESTOSTERONE: CPT

## 2025-04-18 PROCEDURE — 85025 COMPLETE CBC W/AUTO DIFF WBC: CPT

## 2025-04-18 PROCEDURE — 36415 COLL VENOUS BLD VENIPUNCTURE: CPT

## 2025-04-18 PROCEDURE — 84153 ASSAY OF PSA TOTAL: CPT

## 2025-04-18 PROCEDURE — 80053 COMPREHEN METABOLIC PANEL: CPT

## 2025-04-18 NOTE — TELEPHONE ENCOUNTER
Spoke with patient about medication concerns. Patient was advised to check with eye doctor on side effect concerns. Patient also advised on nosebleeds and will go to the ER if bleeding doesn't stop. Patient will call back with any changes.

## 2025-04-20 ENCOUNTER — APPOINTMENT (OUTPATIENT)
Dept: RADIOLOGY | Facility: HOSPITAL | Age: 75
End: 2025-04-20
Payer: MEDICARE

## 2025-04-20 ENCOUNTER — HOSPITAL ENCOUNTER (EMERGENCY)
Facility: HOSPITAL | Age: 75
Discharge: HOME | End: 2025-04-20
Attending: STUDENT IN AN ORGANIZED HEALTH CARE EDUCATION/TRAINING PROGRAM
Payer: MEDICARE

## 2025-04-20 ENCOUNTER — APPOINTMENT (OUTPATIENT)
Dept: CARDIOLOGY | Facility: HOSPITAL | Age: 75
End: 2025-04-20
Payer: MEDICARE

## 2025-04-20 VITALS
WEIGHT: 200 LBS | OXYGEN SATURATION: 99 % | RESPIRATION RATE: 16 BRPM | BODY MASS INDEX: 27.09 KG/M2 | TEMPERATURE: 98.2 F | HEIGHT: 72 IN | DIASTOLIC BLOOD PRESSURE: 89 MMHG | HEART RATE: 89 BPM | SYSTOLIC BLOOD PRESSURE: 121 MMHG

## 2025-04-20 DIAGNOSIS — I48.91 ATRIAL FIBRILLATION, UNSPECIFIED TYPE (MULTI): Primary | ICD-10-CM

## 2025-04-20 DIAGNOSIS — R42 DIZZINESS: ICD-10-CM

## 2025-04-20 LAB
ALBUMIN SERPL BCP-MCNC: 3.9 G/DL (ref 3.4–5)
ALP SERPL-CCNC: 70 U/L (ref 33–136)
ALT SERPL W P-5'-P-CCNC: 10 U/L (ref 10–52)
ANION GAP SERPL CALCULATED.3IONS-SCNC: 9 MMOL/L (ref 10–20)
AST SERPL W P-5'-P-CCNC: 15 U/L (ref 9–39)
BASOPHILS # BLD AUTO: 0.04 X10*3/UL (ref 0–0.1)
BASOPHILS NFR BLD AUTO: 0.9 %
BILIRUB SERPL-MCNC: 0.9 MG/DL (ref 0–1.2)
BUN SERPL-MCNC: 16 MG/DL (ref 6–23)
CALCIUM SERPL-MCNC: 8.9 MG/DL (ref 8.6–10.3)
CARDIAC TROPONIN I PNL SERPL HS: 3 NG/L (ref 0–20)
CARDIAC TROPONIN I PNL SERPL HS: 4 NG/L (ref 0–20)
CHLORIDE SERPL-SCNC: 102 MMOL/L (ref 98–107)
CO2 SERPL-SCNC: 32 MMOL/L (ref 21–32)
CREAT SERPL-MCNC: 1.16 MG/DL (ref 0.5–1.3)
EGFRCR SERPLBLD CKD-EPI 2021: 66 ML/MIN/1.73M*2
EOSINOPHIL # BLD AUTO: 0.07 X10*3/UL (ref 0–0.4)
EOSINOPHIL NFR BLD AUTO: 1.5 %
ERYTHROCYTE [DISTWIDTH] IN BLOOD BY AUTOMATED COUNT: 14.2 % (ref 11.5–14.5)
GLUCOSE SERPL-MCNC: 104 MG/DL (ref 74–99)
HCT VFR BLD AUTO: 38 % (ref 41–52)
HGB BLD-MCNC: 12.6 G/DL (ref 13.5–17.5)
IMM GRANULOCYTES # BLD AUTO: 0 X10*3/UL (ref 0–0.5)
IMM GRANULOCYTES NFR BLD AUTO: 0 % (ref 0–0.9)
LYMPHOCYTES # BLD AUTO: 1.63 X10*3/UL (ref 0.8–3)
LYMPHOCYTES NFR BLD AUTO: 35.1 %
MCH RBC QN AUTO: 27.8 PG (ref 26–34)
MCHC RBC AUTO-ENTMCNC: 33.2 G/DL (ref 32–36)
MCV RBC AUTO: 84 FL (ref 80–100)
MONOCYTES # BLD AUTO: 0.5 X10*3/UL (ref 0.05–0.8)
MONOCYTES NFR BLD AUTO: 10.8 %
NEUTROPHILS # BLD AUTO: 2.41 X10*3/UL (ref 1.6–5.5)
NEUTROPHILS NFR BLD AUTO: 51.7 %
NRBC BLD-RTO: 0 /100 WBCS (ref 0–0)
PLATELET # BLD AUTO: 268 X10*3/UL (ref 150–450)
POTASSIUM SERPL-SCNC: 3.3 MMOL/L (ref 3.5–5.3)
PROT SERPL-MCNC: 7.1 G/DL (ref 6.4–8.2)
RBC # BLD AUTO: 4.53 X10*6/UL (ref 4.5–5.9)
SODIUM SERPL-SCNC: 140 MMOL/L (ref 136–145)
TSH SERPL-ACNC: 1.01 MIU/L (ref 0.44–3.98)
WBC # BLD AUTO: 4.7 X10*3/UL (ref 4.4–11.3)

## 2025-04-20 PROCEDURE — 71045 X-RAY EXAM CHEST 1 VIEW: CPT

## 2025-04-20 PROCEDURE — 2500000004 HC RX 250 GENERAL PHARMACY W/ HCPCS (ALT 636 FOR OP/ED)

## 2025-04-20 PROCEDURE — 85025 COMPLETE CBC W/AUTO DIFF WBC: CPT

## 2025-04-20 PROCEDURE — 99285 EMERGENCY DEPT VISIT HI MDM: CPT | Performed by: STUDENT IN AN ORGANIZED HEALTH CARE EDUCATION/TRAINING PROGRAM

## 2025-04-20 PROCEDURE — 71045 X-RAY EXAM CHEST 1 VIEW: CPT | Mod: FOREIGN READ | Performed by: RADIOLOGY

## 2025-04-20 PROCEDURE — 80053 COMPREHEN METABOLIC PANEL: CPT

## 2025-04-20 PROCEDURE — 84484 ASSAY OF TROPONIN QUANT: CPT

## 2025-04-20 PROCEDURE — 93005 ELECTROCARDIOGRAM TRACING: CPT

## 2025-04-20 PROCEDURE — 84443 ASSAY THYROID STIM HORMONE: CPT

## 2025-04-20 PROCEDURE — 36415 COLL VENOUS BLD VENIPUNCTURE: CPT

## 2025-04-20 PROCEDURE — 96360 HYDRATION IV INFUSION INIT: CPT

## 2025-04-20 RX ADMIN — SODIUM CHLORIDE 500 ML: 900 INJECTION, SOLUTION INTRAVENOUS at 15:17

## 2025-04-20 ASSESSMENT — PAIN SCALES - GENERAL: PAINLEVEL_OUTOF10: 0 - NO PAIN

## 2025-04-20 ASSESSMENT — PAIN - FUNCTIONAL ASSESSMENT: PAIN_FUNCTIONAL_ASSESSMENT: 0-10

## 2025-04-20 NOTE — ED PROVIDER NOTES
"The patient was seen in conjunction with the advanced practice provider, and I performed a substantive portion of the encounter. The following is my supervisory note.    History:  Patient presents ED for postural dizziness today associated with bending over and standing up quickly.  Denies any syncope.  Notes no associated retrosternal pleuritic chest pain.  Has not appreciated unexpected weight gain or bilateral/neural leg swelling.  Does note some minimal to mild dyspnea on exertion with minimal decreased amatory distance associate with symptoms.  Denies any productive cough.  Notes no appreciable infectious symptoms to include fever/chills.  States he has missed a couple doses of his metoprolol in the evening to manage his A-fib presents for any significant palpitations.  Notes he does not take Eliquis at this time secondary to having an ablation and felt he has not been in A-fib for a long time.  Has had discussions with his cardiologist to \"states it is okay for him not to be on Eliquis\".  Denies any changes in bowel/bladder habits.  Denies any other significant systemic symptoms or complaints.    VS:  /89   Pulse 89   Temp 36.8 °C (98.2 °F)   Resp 16   Ht 1.829 m (6')   Wt 90.7 kg (200 lb)   SpO2 99%   BMI 27.12 kg/m²      Physical exam:  CONST: alert, normal appearance, no acute distress, does not appear ill/toxic  HEAD: normocephalic, atraumatic  EYES: PEPRL, EOMI, no scleral icterus, no nystagmus  CV: RRR, no murmurs, 2+ equal/symmetrical pulses x4  PULM: CTAB, no respiratory distress, not requiring supplemental O2  MSK: equal/symmetrical tone x4  SKIN: warm/dry, no pallor or jaundice, no rash  NEURO: A&Ox4, no facial asymmetry, no focal neuro deficits, gross strength/motor/sensation intact x4, normal gait  PSYCH: Appropriate affect      I personally reviewed and interpreted the following studies: EKG is A-fib 95, LAD/LAFB, no appreciable ischemia with nonspecific TW findings, labs are " significant for baseline normocytic anemia, mild hypokalemia, no significant troponinemia, images are notable for CXR no evidence of cardiomegaly or pulmonary vascular congestion/opacities .      MDM:  Patient presented to the ED for evaluation of postural dizziness.  Concerning PMHx of A-fib, HTN, HLD, CKD, JEFFREY, GERD.    Per Chart Review: Cardiology office on 1/16/2025 to establish care for paroxysmal A-fib, but summary significant for no present health concerns or complaints, exam unremarkable/noncontributory, diagnosed with persistent A-fib, had discussion with cardiologist concerning stopping Eliquis and requiring frequent EKG monitoring, continued established medical management of chronic comorbidities.    Assessment/evaluation consistent with postural dizziness and likely symptomatic orthostatic hypovolemic hypotension with concern of also medication side effect, evidence of A-fib without RVR and medication noncompliant given not taking AC meds. No concerning history, clinical evidence/work-up, or exam findings for the concerning differentials of ACS/MI, PTX, focal/multifocal lobar PNA, ischemic CVA, worsening normocytic anemia, significant electrolyte/metabolic derangement. These conditions have been thoroughly evaluated and determined to be sufficiently unlikely to be the etiology of patient's presenting symptoms.     Scores: [id number] 3 and Heart 4 (12-16% MACE)    ED Course/Diagnosis:  ED Course as of 04/24/25 0914   Sun Apr 20, 2025   1445 I personally reviewed and interpreted the EKG @1439: Afib 95, no appreciable ischemia, LAD, LAFB, non-specific TW findings, and prior EKG on 1/16/2025 reviewed without any appreciable specific/identifiable changes, prior EKG noted for sinus rhythm [BC]      ED Course User Index  [BC] Hammad Campbell MD         Diagnoses as of 04/24/25 0914   Atrial fibrillation, unspecified type (Multi)   Dizziness       1. Atrial fibrillation, unspecified type (Multi)        2.  Dizziness                 Hammad Campbell MD  04/24/25 0914       Hammad Campbell MD  04/24/25 0915

## 2025-04-20 NOTE — Clinical Note
Christiano Masters was seen and treated in our emergency department on 4/20/2025.  He may return to work on 04/23/2025.       If you have any questions or concerns, please don't hesitate to call.      Brijesh Mcpherson PA-C

## 2025-04-20 NOTE — DISCHARGE INSTRUCTIONS
Please follow with your cardiologist.  Take 81 mg aspirin daily until you follow with your cardiologist.  At cardiology office, states that you would like to restart taking your Eliquis.    Be sure to take all medications, over the counter medications or prescription medications only as directed.    Be sure to follow up as directed in 1-2 days. All of the details of your follow up instructions are detailed in the follow up section of this packet.    If you are being discharged with any pains medications or muscle relaxers (norco, Vicodin, hydrocodone products, Percocet, oxycodone products, flexeril, cyclobenzaprine, robaxin, norflex, brand or generic, or any other pain controlling medications with the exception of Ibuprofen and regular Tylenol, do not drive or operate machinery, climb ladders or participate in any activity that could potentially put yourself or others at risk should you get dizzy, or be/feel impaired at all.    Return to emergency room without delay for ANY new or worsening pains or for any other symptoms or concerns. Return with worsening pains, nausea, vomiting, trouble breathing, palpitations, shortness of breath, inability to pass stool or urine, loss of control of stool or urine, any numbness or tingling (that is not normal for you), uncontrolled fevers, the passing of blood or other material in stool or urine, rashes, pains or for any other symptoms or concerns you may have. You are always welcome to return to the ER at any time for any reason or for any other concerns you may have.

## 2025-04-20 NOTE — ED PROVIDER NOTES
HPI   Chief Complaint   Patient presents with    Dizziness       Patient is a 74-year-old male with past medical history of atrial fibrillation, hypertension, cholesterol presenting concerns for dizziness.  He states that he feels that he may be in an A-fib rhythm.  He had an ablation 1 or 2 years ago.  He is not on Eliquis because of the ablation.  He follows with cardiology outpatient.  He states that he has missed multiple doses of metoprolol.  He took his morning dose today but did not take his evening dose yesterday.  He also did not take his evening dose the day before yesterday.  States the dizziness is usually onset after he goes from picking something up to standing up straight.  Patient denies fevers, chills, cough, sore throat, runny nose, chest pain, shortness of breath, abdominal pain, nausea, vomiting, diarrhea or urinary complaints.              Patient History   Medical History[1]  Surgical History[2]  Family History[3]  Social History[4]    Physical Exam   ED Triage Vitals   Temp Pulse Resp BP   -- -- -- --      SpO2 Temp src Heart Rate Source Patient Position   -- -- -- --      BP Location FiO2 (%)     -- --       Physical Exam  Vitals and nursing note reviewed.   Constitutional:       Appearance: He is well-developed.      Comments: Awake, laying in examination bed   HENT:      Head: Normocephalic and atraumatic.      Nose: Nose normal.      Mouth/Throat:      Mouth: Mucous membranes are moist.      Pharynx: Oropharynx is clear.   Eyes:      Extraocular Movements: Extraocular movements intact.      Conjunctiva/sclera: Conjunctivae normal.      Pupils: Pupils are equal, round, and reactive to light.   Cardiovascular:      Rate and Rhythm: Rhythm irregular.      Pulses: Normal pulses.      Heart sounds: Normal heart sounds. No murmur heard.  Pulmonary:      Effort: Pulmonary effort is normal. No respiratory distress.      Breath sounds: Normal breath sounds.   Abdominal:      General: Abdomen is  flat.      Palpations: Abdomen is soft.      Tenderness: There is no abdominal tenderness.   Musculoskeletal:         General: No swelling. Normal range of motion.      Cervical back: Normal range of motion and neck supple.   Skin:     General: Skin is warm and dry.      Capillary Refill: Capillary refill takes less than 2 seconds.   Neurological:      General: No focal deficit present.      Mental Status: He is alert and oriented to person, place, and time.   Psychiatric:         Mood and Affect: Mood normal.         Behavior: Behavior normal.           ED Course & MDM   ED Course as of 04/20/25 1656   Sun Apr 20, 2025   1445 I personally reviewed and interpreted the EKG @1439: Afib 95, no appreciable ischemia, LAD, LAFB, non-specific TW findings, and prior EKG on 1/16/2025 reviewed without any appreciable specific/identifiable changes, prior EKG noted for sinus rhythm [BC]      ED Course User Index  [BC] Hammad Campbell MD         Diagnoses as of 04/20/25 1656   Atrial fibrillation, unspecified type (Multi)   Dizziness                 No data recorded     Brandon Coma Scale Score: 15 (04/20/25 1439 : Kay Jackson RN)       NIH Stroke Scale: 0 (04/20/25 1608 : Brijesh Mcpherson PA-C)                   Medical Decision Making  Patient is a 74-year-old male with past medical history of atrial fibrillation, hypertension, high cholesterol presenting with concerns for dizziness.  Lab work, imaging ordered.  IV fluids ordered.  Conditions considered include but are not limited: Electrolyte abnormality, A-fib, thyroid disease.    I saw this patient in conjunction with Dr. Campbell.  Thyroid within normal limits.  Troponin within normal limits.  CBC is without leukocytosis but does show signs of anemia hemoglobin of 4.6.  CMP without significant electrolyte abnormality or renal impairment.  Repeat troponin within normal limit.  Chest x-ray without acute cardiopulmonary process.    We had a long discussion about  talking to his cardiologist about restarting Eliquis which is not currently taking it.  It appears he is discontinued on his own accord.  I believe this patient is at low risk for complication, and a disposition of discharge is acceptable.  Return to the Emergency Department if new or worsening symptoms including headache, fever, chills, chest pain, shortness of breath, syncope, near syncope, abdominal pain, nausea, vomiting,  diarrhea, or worsening pain.  Patient is agreeable to a disposition of discharge and to follow with respective fields in the neck several days.    Portions of this note made with Dragon software, please be mindful of potential grammatical errors.      Medications   sodium chloride 0.9 % bolus 500 mL (0 mL intravenous Stopped 4/20/25 1647)     Labs Reviewed   CBC WITH AUTO DIFFERENTIAL - Abnormal       Result Value    WBC 4.7      nRBC 0.0      RBC 4.53      Hemoglobin 12.6 (*)     Hematocrit 38.0 (*)     MCV 84      MCH 27.8      MCHC 33.2      RDW 14.2      Platelets 268      Neutrophils % 51.7      Immature Granulocytes %, Automated 0.0      Lymphocytes % 35.1      Monocytes % 10.8      Eosinophils % 1.5      Basophils % 0.9      Neutrophils Absolute 2.41      Immature Granulocytes Absolute, Automated 0.00      Lymphocytes Absolute 1.63      Monocytes Absolute 0.50      Eosinophils Absolute 0.07      Basophils Absolute 0.04     COMPREHENSIVE METABOLIC PANEL - Abnormal    Glucose 104 (*)     Sodium 140      Potassium 3.3 (*)     Chloride 102      Bicarbonate 32      Anion Gap 9 (*)     Urea Nitrogen 16      Creatinine 1.16      eGFR 66      Calcium 8.9      Albumin 3.9      Alkaline Phosphatase 70      Total Protein 7.1      AST 15      Bilirubin, Total 0.9      ALT 10     TSH WITH REFLEX TO FREE T4 IF ABNORMAL - Normal    Thyroid Stimulating Hormone 1.01      Narrative:     TSH testing is performed using different testing methodology at [facility] than at other system  \[facility]\"". Direct result comparisons should only be made within the same method.     SERIAL TROPONIN-INITIAL - Normal    Troponin I, High Sensitivity 4      Narrative:     Less than 99th percentile of normal range cutoff-  Female and children under 18 years old <14 ng/L; Male <21 ng/L: Negative  Repeat testing should be performed if clinically indicated.     Female and children under 18 years old 14-50 ng/L; Male 21-50 ng/L:  Consistent with possible cardiac damage and possible increased clinical   risk. Serial measurements may help to assess extent of myocardial damage.     >50 ng/L: Consistent with cardiac damage, increased clinical risk and  myocardial infarction. Serial measurements may help assess extent of   myocardial damage.      NOTE: Children less than 1 year old may have higher baseline troponin   levels and results should be interpreted in conjunction with the overall   clinical context.     NOTE: Troponin I testing is performed using a different   testing methodology at [facility] than at other   [facility]. Direct result comparisons should only   be made within the same method.   SERIAL TROPONIN, 1 HOUR - Normal    Troponin I, High Sensitivity 3      Narrative:     Less than 99th percentile of normal range cutoff-  Female and children under 18 years old <14 ng/L; Male <21 ng/L: Negative  Repeat testing should be performed if clinically indicated.     Female and children under 18 years old 14-50 ng/L; Male 21-50 ng/L:  Consistent with possible cardiac damage and possible increased clinical   risk. Serial measurements may help to assess extent of myocardial damage.     >50 ng/L: Consistent with cardiac damage, increased clinical risk and  myocardial infarction. Serial measurements may help assess extent of   myocardial damage.      NOTE: Children less than 1 year old may have higher baseline troponin   levels and results should be interpreted in conjunction with the overall   clinical  "context.     NOTE: Troponin I testing is performed using a different   testing methodology at [facility] than at other   [facility] hospitals. Direct result comparisons should only   be made within the same method.   TROPONIN SERIES- (INITIAL, 1 HR)    Narrative:     The following orders were created for panel order Troponin I Series, High Sensitivity (0, 1 HR).  Procedure                               Abnormality         Status                     ---------                               -----------         ------                     Troponin I, High Sensiti...[[id number]]  Normal              Final result               Troponin, High Sensitivi...[[id number]]  Normal              Final result                 Please view results for these tests on the individual orders.     XR chest 1 view   Final Result   No acute disease.   Signed by Hal Vincent MD            Procedure  Procedures         [1]   Past Medical History:  Diagnosis Date    A-fib (Multi)     on Eliquis, pre surgery stop instructions and clearance in 2/9/24 \"letters\"    Acute hepatitis C     Genotype 1a, s/p Harvoni [id number]    Anxiety     ASCVD (arteriosclerotic cardiovascular disease)     Benign prostatic hyperplasia with lower urinary tract symptoms     Chronic pain disorder     back    CKD (chronic kidney disease)     CKD (chronic kidney disease)     Cluster headache, chronic     Elevated PSA 12/21/2023    5.97    GERD (gastroesophageal reflux disease)     Hepatitis B     + antibody    Hyperaldosteronism     Hypercholesteremia     Hypertension     Mitral valve insufficiency     Monoclonal gammopathy     MGUS followed by Hem/Onc Dr. Ochoa    JEFFREY (obstructive sleep apnea)     Prostate cancer (Multi)     RT and ADT [id number]    Tobacco use disorder    [2]   Past Surgical History:  Procedure Laterality Date    CARDIOVERSION  12/2022    and Ablation for A Fib    COLONOSCOPY  08/26/2019    Colonoscopy    HERNIA REPAIR  11/15/2021    Hernia repair "    PROSTATE BIOPSY  06/11/2014   [3]   Family History  Family history unknown: Yes   [4]   Social History  Tobacco Use    Smoking status: Former     Current packs/day: 20.00     Types: Cigarettes    Smokeless tobacco: Never   Vaping Use    Vaping status: Never Used   Substance Use Topics    Alcohol use: Never    Drug use: Never        Brijesh Mcpherson PA-C  04/20/25 3197

## 2025-04-21 ENCOUNTER — APPOINTMENT (OUTPATIENT)
Dept: UROLOGY | Facility: HOSPITAL | Age: 75
End: 2025-04-21
Payer: MEDICARE

## 2025-04-21 LAB
Q ONSET: 207 MS
QRS COUNT: 16 BEATS
QRS DURATION: 110 MS
QT INTERVAL: 340 MS
QTC CALCULATION(BAZETT): 427 MS
QTC FREDERICIA: 396 MS
R AXIS: -31 DEGREES
T AXIS: 56 DEGREES
T OFFSET: 377 MS
VENTRICULAR RATE: 95 BPM

## 2025-04-22 NOTE — PROGRESS NOTES
Cardiac Electrophysiology Office Visit   I had the pleasure seeing Christiano Masters    Current Outpatient Medications   Medication Instructions    amLODIPine (NORVASC) 10 mg, oral, Daily    Eliquis 5 mg, oral, 2 times daily    fluticasone (Flonase) 50 mcg/actuation nasal spray 2 sprays, Each Nostril, Daily    losartan-hydrochlorothiazide (Hyzaar) 100-12.5 mg tablet 1 tablet, oral, Daily    metoprolol tartrate (LOPRESSOR) 25 mg, oral, 2 times daily    multivitamin tablet 1 tablet, Daily    pravastatin (PRAVACHOL) 40 mg, oral, Nightly    tamsulosin (FLOMAX) 0.8 mg, oral, Daily      Subjective    Christiano Masters is a 74 y.o. male .        Chief Complaint   Patient presents with    Atrial Fibrillation     Paroxysmal Atrial Fibrillation    Hospital Follow-up     OUTPATIENT CONSULTATION: Cardiac Electrophysiology  DOS: 04/23/2025   REASON: AF - F/U from Hospital Visit     HPI     Christiano Masters has a past medical history of Hepatitis B&C - s/p Harvoni ([id number]) with neg VL, Anxiety, BPH, Chronic pain disorder, CKD, Cluster headache, GERD, Hyperaldosteronism, Hypercholesteremia, HTN, JEFFREY, Prostate cancer - s/p RT and ADT, Organ-limited Amyloidosis   CARDIAC HISTORY:  CAD   Persistent AF - Found on EKG at PCP office (May 19, 2022). He was seen for diarrhea and discoloration in his urine. This was after a trip in [place]. Started on Metoprolol for rate control. No OAT.   AF treatment hx:  (index dx 5/19/22) started on Metoprolol.  S/p DCCV (June 2022).  Back in AF (Nov 2022).   Successful PVI by me (12/19/22) without complications. Noted to be in AF during 6week f/u.  Underwent successful DCCV (2/27/23).  May 2022: We initiated OAT and will proceed with cardioversion. Should he feel better in NSR and reverts into atrial fibrillation our next step would be to proceed with either an antiarrhythmic or PVI.   June, 2022: successful DCCV done by me  Nov, 2022: He felt much better after the cardioversion. But recently  started noticing lightheadedness especially when standing up. He also has noted his blood pressure is becoming lower. He is back in atrial fibrillation.  Proceed with PVI.    Dec, 2022:  Underwent PVI by me without complications. Noted to be in AF during 6 wk follow up.  Underwent successful DCCV (2/27/23).  March 2024: Eliquis stopped per pt request.  April 2025: He presented to the ED for dizziness and feeling like he was in AF. He stated he had missed multiple doses of his Metoprolol. His EKG confirmed he was in AF.    [id number]-SENe Score  Age 65-74: 1   Sex Male: 0   CHF History No: 0   HTN Yes: 1   Stroke/TIA/Thromboembolism No: 0   Vascular Dz: CAD/PAD/Aortic Plaque Yes: 1   DM No: 0   Total Score 3     RELEVANT TESTING:     No results found for this or any previous visit from the past 1095 days.       Lab Review:   Lab Results   Component Value Date    WBC 4.7 04/20/2025    HGB 12.6 (L) 04/20/2025    HCT 38.0 (L) 04/20/2025     04/20/2025    CHOL 128 07/02/2024    TRIG 56 07/02/2024    HDL 42.3 07/02/2024    ALT 10 04/20/2025    AST 15 04/20/2025     04/20/2025    K 3.3 (L) 04/20/2025     04/20/2025    CREATININE 1.16 04/20/2025    BUN 16 04/20/2025    CO2 32 04/20/2025    TSH 1.01 04/20/2025    PSA 0.23 04/18/2025    INR 1.2 (H) 02/29/2024    HGBA1C 5.8 (H) 07/02/2024       Review of Systems   All other systems reviewed and are negative.       Objective    Cardiovascular:      PMI at left midclavicular line. Normal rate. Regular rhythm. Normal S1. Normal S2.       Murmurs: There is no murmur.      No gallop.  No click. No rub.   Pulses:     Intact distal pulses.   Edema:     Peripheral edema absent.            Assessment/Plan     Hypertension  He is on amlodipine 10 mg once a day. Losartan/hydrochlorothiazide was  added in February/March of 2024 as his blood pressure was elevated. He has now noticed some low blood pressure readings in the morning but he is asymptomatic. I would not favor  changing the therapies.      Persistent atrial fibrillation (CMS/HCC)  He had symptomatic persistent atrial fibrillation. S/p PVI in December of 2022 and has not had a recurrence until now April of 2025.  He however wanted to stop the Eliquis. We discussed the options, his CHADS VASC is 2. He preferred to stop the Eliquis with frequent ECG monitoring as he has had no recurrence of atrial fibrillation. I did explain to him the risks of stopping the OAT, he has done well despite stopping awhile ago the OAT. However he reverted back in atrial fibrillation . We will reinitiate the Eliquis and we will proceed with cardioversion

## 2025-04-23 ENCOUNTER — OFFICE VISIT (OUTPATIENT)
Dept: CARDIOLOGY | Facility: CLINIC | Age: 75
End: 2025-04-23
Payer: MEDICARE

## 2025-04-23 DIAGNOSIS — I48.91 ATRIAL FIBRILLATION (MULTI): ICD-10-CM

## 2025-04-23 DIAGNOSIS — I48.0 PAROXYSMAL ATRIAL FIBRILLATION (MULTI): Primary | ICD-10-CM

## 2025-04-23 PROCEDURE — 1160F RVW MEDS BY RX/DR IN RCRD: CPT | Performed by: INTERNAL MEDICINE

## 2025-04-23 PROCEDURE — G2211 COMPLEX E/M VISIT ADD ON: HCPCS | Performed by: INTERNAL MEDICINE

## 2025-04-23 PROCEDURE — 99214 OFFICE O/P EST MOD 30 MIN: CPT | Performed by: INTERNAL MEDICINE

## 2025-04-23 PROCEDURE — 1159F MED LIST DOCD IN RCRD: CPT | Performed by: INTERNAL MEDICINE

## 2025-04-23 PROCEDURE — 93000 ELECTROCARDIOGRAM COMPLETE: CPT | Performed by: INTERNAL MEDICINE

## 2025-04-23 PROCEDURE — 1157F ADVNC CARE PLAN IN RCRD: CPT | Performed by: INTERNAL MEDICINE

## 2025-04-23 ASSESSMENT — ENCOUNTER SYMPTOMS
OCCASIONAL FEELINGS OF UNSTEADINESS: 0
LOSS OF SENSATION IN FEET: 0
DEPRESSION: 0

## 2025-04-29 ENCOUNTER — HOSPITAL ENCOUNTER (OUTPATIENT)
Dept: RADIOLOGY | Facility: HOSPITAL | Age: 75
Discharge: HOME | End: 2025-04-29
Payer: MEDICARE

## 2025-04-29 DIAGNOSIS — J33.9 RIGHT NASAL POLYPS: ICD-10-CM

## 2025-04-29 DIAGNOSIS — J34.89 NASAL CAVITY MASS: ICD-10-CM

## 2025-04-29 PROCEDURE — 70486 CT MAXILLOFACIAL W/O DYE: CPT

## 2025-04-30 ENCOUNTER — APPOINTMENT (OUTPATIENT)
Dept: OTOLARYNGOLOGY | Facility: CLINIC | Age: 75
End: 2025-04-30
Payer: MEDICARE

## 2025-04-30 ENCOUNTER — OFFICE VISIT (OUTPATIENT)
Dept: OTOLARYNGOLOGY | Facility: CLINIC | Age: 75
End: 2025-04-30
Payer: MEDICARE

## 2025-04-30 DIAGNOSIS — H61.22 IMPACTED CERUMEN OF LEFT EAR: ICD-10-CM

## 2025-04-30 DIAGNOSIS — J33.9 NASAL POLYP: Primary | ICD-10-CM

## 2025-04-30 PROCEDURE — 99214 OFFICE O/P EST MOD 30 MIN: CPT | Performed by: OTOLARYNGOLOGY

## 2025-04-30 PROCEDURE — 1157F ADVNC CARE PLAN IN RCRD: CPT | Performed by: OTOLARYNGOLOGY

## 2025-04-30 PROCEDURE — 31237 NSL/SINS NDSC SURG BX POLYPC: CPT | Performed by: OTOLARYNGOLOGY

## 2025-04-30 PROCEDURE — 1036F TOBACCO NON-USER: CPT | Performed by: OTOLARYNGOLOGY

## 2025-04-30 PROCEDURE — 69210 REMOVE IMPACTED EAR WAX UNI: CPT | Performed by: OTOLARYNGOLOGY

## 2025-04-30 PROCEDURE — 1159F MED LIST DOCD IN RCRD: CPT | Performed by: OTOLARYNGOLOGY

## 2025-04-30 NOTE — PROGRESS NOTES
"History Of Present Illness  Christiano Masters is a 74 y.o. male presenting with: \"Nasal discharge/blockage\".    He has experienced difficulty breathing through the right side of his nose for approximately three months. He was previously evaluated by Dr. Elizabeth in [place] and underwent a CT scan of the sinuses, which revealed opacification of the right maxillary sinus with a widened antrum. A polypoid mass was noted in right nasal cavity.    On examination, there is an irregular polypoid mass coming out of right middle meatus and extending to the floor of the right nasal cavity. After administration of topical and local anesthesia, multiple punch biopsies were obtained.    Plan:  1- Follow-up in 2 weeks to discuss pathology results and further management.     Past Medical History  He has a past medical history of A-fib (Multi), Acute hepatitis C, Anxiety, ASCVD (arteriosclerotic cardiovascular disease), Benign prostatic hyperplasia with lower urinary tract symptoms, Chronic pain disorder, CKD (chronic kidney disease), CKD (chronic kidney disease), Cluster headache, chronic, Elevated PSA (12/21/2023), GERD (gastroesophageal reflux disease), Hepatitis B, Hyperaldosteronism, Hypercholesteremia, Hypertension, Mitral valve insufficiency, Monoclonal gammopathy, JEFFREY (obstructive sleep apnea), Prostate cancer (Multi), and Tobacco use disorder.    Surgical History  He has a past surgical history that includes Colonoscopy (08/26/2019); Hernia repair (11/15/2021); Cardioversion (12/2022); and Prostate biopsy (06/11/2014).     Social History  He reports that he has quit smoking. His smoking use included cigarettes. He has never used smokeless tobacco. He reports that he does not drink alcohol and does not use drugs.    Family History  Family History[1]     Allergies  Patient has no known allergies.    Review of Systems   Nasal discharge  Nasal blockage     Physical Exam    General appearance: Healthy-appearing, well-nourished, " well groomed, in no acute distress.     Head and Face: Atraumatic with no masses, lesions, or scarring.      Salivary glands: No tenderness of the parotid glands or parotid masses.     No tenderness of the submandibular glands or submandibular masses.      Facial strength: Normal strength and symmetry, no synkinesis or facial tic.     Eyes: Conjunctivas look non-hyperemic bilaterally    Ears: Wax was cleaned from left ear canal. Bilaterally ear canals look normal. Tympanic membranes look intact, no hyperemia, fluid or retraction. Hearing grossly normal.      Nose: Please see endoscopy note    Oral Cavity/Mouth: Lips and tongue look normal.     Throat: No postnasal discharge. No tonsil hypertrophy. No hyperemia.    Neck: Symmetrical, trachea midline.     Pulmonary: Normal respiratory effort.     Lymphatic: No palpable pathologic lymph nodes at neck.     Neurological/Psychiatric Orientation to person, place, and time: Normal.     Mood and affect: Normal.      Extremities: No clubbing.     Skin: No significant skin lesions were noted at face or neck        Procedure  NASAL ENDOSCOPY and BIOPSY 04.30.2025  Procedure was explained to the patient. Verbal consent was obtained. Topical lidocaine and adrenaline was applied to right nasal cavity. 0 degree nasal endoscope was advanced through patient's nasal cavity. On examination, patient's septum was deviated to left. Mucosa looked normal. There was an irregular polypoid mass coming out of right middle meatus and extending to the floor of the right nasal cavity.     Verbal consent was obtained. After injecting lidocaine mixed with bupivacaine and epinephrine to the polypoid mass, multiple punch biopsies were obtained. Mild bleeding has happened, patient tolerated the procedure well.     EAR WAX REMOVAL 04.30.2025  Patient had left ear wax. Using small instrument(s) and/or suction cleaning was done. Patient tolerated the procedure well.              Last Recorded  Vitals  There were no vitals taken for this visit.    Relevant Results    Narrative & Impression   Interpreted By:  Duane Lopez,   STUDY:  CT SINUS WO IV CONTRAST VOLUMETRIC SURGICAL PLANNING; 4/29/2025 11:38  am      INDICATION:  Signs/Symptoms:right nasal cavity mass, right nasal polyps.      ,J34.89 Other specified disorders of nose and nasal sinuses,J33.9  Nasal polyp, unspecified      COMPARISON:  None.      ACCESSION NUMBER(S):  [id number]      ORDERING CLINICIAN:  JEREMIAS FERRER      TECHNIQUE:  Axial CT images of the paranasal sinuses were obtained and  reconstructed in the coronal and sagittal plane.      FINDINGS:  Nasal cavity:  The nasal septum deviates to the left with bony spur  impinging on the left middle turbinate and inferior turbinate. The  left uncinate process inserts on the lamina papyracea. The right  uncinate process is not as well visualized but also appears to insert  on the lamina papyracea.      The right middle meatus and nasal cavity as well as the inferior  nasal cavity are opacified by polyp like material that appears to  extend from completely opacified right maxillary sinus. The right  middle turbinate appears displaced medially.      No bony dehiscence along the cribriform plates is noted. The right  olfactory fossa is 4 mm depth; the left olfactory fossa is similar  depth but elevated 2 mm relative to the right cribriform plate.      Maxillary sinuses:  The right maxillary sinus is completely opacified  with material extending through the ostium into the middle meatus.  The left maxillary sinus is normally aerated.      Frontal sinuses:  The right frontal sinus has mild mucosal thickening  inferiorly and medially. The left frontal sinus is normally aerated.      Ethmoid sinuses:  The anterior ethmoid air cells are partially  opacified more so on the right with small amounts of mucus more  posteriorly bilaterally. No bony dehiscence along the lamina  papyracea is noted. Air cells  extend partially over the notches for  the anterior ethmoid arteries bilaterally.      Sphenoid sinuses:  The sphenoid sinuses have minimal for focal areas  of mucosal thickening bilaterally. The septum deviates slightly to  the left of midline posteriorly. Bilateral onodi cells extend into  the anterior clinoid processes bilaterally. No bony dehiscences are  noted.      Mastoid air cells: Normally aerated.      The left external auditory canal has debris/cerumen.      IMPRESSION:  1. The right maxillary sinus is completely opacified with material  extending into the right middle meatus as noted above. Mucocele is  not excluded.      2. Mild mucosal thickening and partial opacifications of the  paranasal sinuses elsewhere most prominent in the anterior right  ethmoid region.      Signed by: Duane Lopez 4/29/2025 1:41 PM  Dictation workstation:   [id number]           Assessment and Plan:  He has experienced difficulty breathing through the right side of his nose for approximately three months. He was previously evaluated by Dr. Elizabeth in [place] and underwent a CT scan of the sinuses, which revealed opacification of the right maxillary sinus with a widened antrum. A polypoid mass was noted in right nasal cavity.    On examination, there is an irregular polypoid mass coming out of right middle meatus and extending to the floor of the right nasal cavity. After administration of topical and local anesthesia, multiple punch biopsies were obtained.    Plan:  1- Follow-up in 2 weeks to discuss pathology results and further management.    Chapito Hunter  Otolaryngology - Head & Neck Surgery         [1]   Family History  Family history unknown: Yes

## 2025-05-14 ENCOUNTER — APPOINTMENT (OUTPATIENT)
Dept: OTOLARYNGOLOGY | Facility: CLINIC | Age: 75
End: 2025-05-14
Payer: MEDICARE

## 2025-05-14 DIAGNOSIS — J34.89 NASAL CAVITY MASS: Primary | ICD-10-CM

## 2025-05-14 DIAGNOSIS — J33.9 RIGHT NASAL POLYPS: ICD-10-CM

## 2025-05-14 PROCEDURE — 99214 OFFICE O/P EST MOD 30 MIN: CPT | Performed by: OTOLARYNGOLOGY

## 2025-05-14 PROCEDURE — 1159F MED LIST DOCD IN RCRD: CPT | Performed by: OTOLARYNGOLOGY

## 2025-05-14 PROCEDURE — 1036F TOBACCO NON-USER: CPT | Performed by: OTOLARYNGOLOGY

## 2025-05-14 NOTE — PROGRESS NOTES
"History Of Present Illness    05.14.2025. Pathology is pending. On examination, he still has the mass in right nasal cavity, but he is able to breathe easier after the biopsy. Left inferior turbinate is congested, nasal septum is deviated to left.     Plan:  1- wait for pathology result, if benign then endoscopic surgery to remove right nasal mass and clean anterior ethmoids, Rf turbinate reduction    66719, 65226, 19878, 84282 (Tentative date 07.03.2025)  ______________________________________________________________________    Christiano Masters is a 74 y.o. male presenting with: \"Nasal discharge/blockage\".    He has experienced difficulty breathing through the right side of his nose for approximately three months. He was previously evaluated by Dr. Elizabeth in [place] and underwent a CT scan of the sinuses, which revealed opacification of the right maxillary sinus with a widened antrum. A polypoid mass was noted in right nasal cavity.    On examination, there is an irregular polypoid mass coming out of right middle meatus and extending to the floor of the right nasal cavity. After administration of topical and local anesthesia, multiple punch biopsies were obtained.    Plan:  1- Follow-up in 2 weeks to discuss pathology results and further management.     Past Medical History  He has a past medical history of A-fib (Multi), Acute hepatitis C, Anxiety, ASCVD (arteriosclerotic cardiovascular disease), Benign prostatic hyperplasia with lower urinary tract symptoms, Chronic pain disorder, CKD (chronic kidney disease), CKD (chronic kidney disease), Cluster headache, chronic, Elevated PSA (12/21/2023), GERD (gastroesophageal reflux disease), Hepatitis B, Hyperaldosteronism, Hypercholesteremia, Hypertension, Mitral valve insufficiency, Monoclonal gammopathy, JEFFREY (obstructive sleep apnea), Prostate cancer (Multi), and Tobacco use disorder.    Surgical History  He has a past surgical history that includes Colonoscopy " (08/26/2019); Hernia repair (11/15/2021); Cardioversion (12/2022); and Prostate biopsy (06/11/2014).     Social History  He reports that he has quit smoking. His smoking use included cigarettes. He has never used smokeless tobacco. He reports that he does not drink alcohol and does not use drugs.    Family History  Family History[1]     Allergies  Patient has no known allergies.    Review of Systems   Nasal discharge  Nasal blockage     Physical Exam    General appearance: Healthy-appearing, well-nourished, well groomed, in no acute distress.     Head and Face: Atraumatic with no masses, lesions, or scarring.      Salivary glands: No tenderness of the parotid glands or parotid masses.     No tenderness of the submandibular glands or submandibular masses.      Facial strength: Normal strength and symmetry, no synkinesis or facial tic.     Eyes: Conjunctivas look non-hyperemic bilaterally    Ears: Wax was cleaned from left ear canal. Bilaterally ear canals look normal. Tympanic membranes look intact, no hyperemia, fluid or retraction. Hearing grossly normal.      Nose: Please see endoscopy note    Oral Cavity/Mouth: Lips and tongue look normal.     Throat: No postnasal discharge. No tonsil hypertrophy. No hyperemia.    Neck: Symmetrical, trachea midline.     Pulmonary: Normal respiratory effort.     Lymphatic: No palpable pathologic lymph nodes at neck.     Neurological/Psychiatric Orientation to person, place, and time: Normal.     Mood and affect: Normal.      Extremities: No clubbing.     Skin: No significant skin lesions were noted at face or neck        Procedure  NASAL ENDOSCOPY and BIOPSY 04.30.2025  Procedure was explained to the patient. Verbal consent was obtained. Topical lidocaine and adrenaline was applied to right nasal cavity. 0 degree nasal endoscope was advanced through patient's nasal cavity. On examination, patient's septum was deviated to left. Mucosa looked normal. There was an irregular polypoid  mass coming out of right middle meatus and extending to the floor of the right nasal cavity.     Verbal consent was obtained. After injecting lidocaine mixed with bupivacaine and epinephrine to the polypoid mass, multiple punch biopsies were obtained. Mild bleeding has happened, patient tolerated the procedure well.     EAR WAX REMOVAL 04.30.2025  Patient had left ear wax. Using small instrument(s) and/or suction cleaning was done. Patient tolerated the procedure well.              Last Recorded Vitals  There were no vitals taken for this visit.    Relevant Results    Narrative & Impression   Interpreted By:  Duane Lopez,   STUDY:  CT SINUS WO IV CONTRAST VOLUMETRIC SURGICAL PLANNING; 4/29/2025 11:38  am      INDICATION:  Signs/Symptoms:right nasal cavity mass, right nasal polyps.      ,J34.89 Other specified disorders of nose and nasal sinuses,J33.9  Nasal polyp, unspecified      COMPARISON:  None.      ACCESSION NUMBER(S):  [id number]      ORDERING CLINICIAN:  JEREMIAS FERRER      TECHNIQUE:  Axial CT images of the paranasal sinuses were obtained and  reconstructed in the coronal and sagittal plane.      FINDINGS:  Nasal cavity:  The nasal septum deviates to the left with bony spur  impinging on the left middle turbinate and inferior turbinate. The  left uncinate process inserts on the lamina papyracea. The right  uncinate process is not as well visualized but also appears to insert  on the lamina papyracea.      The right middle meatus and nasal cavity as well as the inferior  nasal cavity are opacified by polyp like material that appears to  extend from completely opacified right maxillary sinus. The right  middle turbinate appears displaced medially.      No bony dehiscence along the cribriform plates is noted. The right  olfactory fossa is 4 mm depth; the left olfactory fossa is similar  depth but elevated 2 mm relative to the right cribriform plate.      Maxillary sinuses:  The right maxillary sinus is  completely opacified  with material extending through the ostium into the middle meatus.  The left maxillary sinus is normally aerated.      Frontal sinuses:  The right frontal sinus has mild mucosal thickening  inferiorly and medially. The left frontal sinus is normally aerated.      Ethmoid sinuses:  The anterior ethmoid air cells are partially  opacified more so on the right with small amounts of mucus more  posteriorly bilaterally. No bony dehiscence along the lamina  papyracea is noted. Air cells extend partially over the notches for  the anterior ethmoid arteries bilaterally.      Sphenoid sinuses:  The sphenoid sinuses have minimal for focal areas  of mucosal thickening bilaterally. The septum deviates slightly to  the left of midline posteriorly. Bilateral onodi cells extend into  the anterior clinoid processes bilaterally. No bony dehiscences are  noted.      Mastoid air cells: Normally aerated.      The left external auditory canal has debris/cerumen.      IMPRESSION:  1. The right maxillary sinus is completely opacified with material  extending into the right middle meatus as noted above. Mucocele is  not excluded.      2. Mild mucosal thickening and partial opacifications of the  paranasal sinuses elsewhere most prominent in the anterior right  ethmoid region.      Signed by: Duane Lopez 4/29/2025 1:41 PM  Dictation workstation:   [id number]           Assessment and Plan:    05.14.2025. Pathology is pending. On examination, he still has the mass in right nasal cavity, but he is able to breathe easier after the biopsy. Left inferior turbinate is congested, nasal septum is deviated to left.     Plan:  1- wait for pathology result, if benign then endoscopic surgery to remove right nasal mass and clean anterior ethmoids, Rf turbinate reduction    01658, 67417, 21892, 63401 (Tentative date 07.03.2025)  ______________________________________________________________________    He has experienced difficulty  breathing through the right side of his nose for approximately three months. He was previously evaluated by Dr. Elizabeth in [place] and underwent a CT scan of the sinuses, which revealed opacification of the right maxillary sinus with a widened antrum. A polypoid mass was noted in right nasal cavity.    On examination, there is an irregular polypoid mass coming out of right middle meatus and extending to the floor of the right nasal cavity. After administration of topical and local anesthesia, multiple punch biopsies were obtained.    Plan:  1- Follow-up in 2 weeks to discuss pathology results and further management.    Chapito Hunter  Otolaryngology - Head & Neck Surgery         [1]   Family History  Family history unknown: Yes

## 2025-05-15 LAB
LABORATORY COMMENT REPORT: NORMAL
PATH REPORT.FINAL DX SPEC: NORMAL
PATH REPORT.GROSS SPEC: NORMAL
PATH REPORT.RELEVANT HX SPEC: NORMAL
PATH REPORT.TOTAL CANCER: NORMAL

## 2025-05-16 NOTE — PROGRESS NOTES
"UROLOGIC FOLLOW-UP VISIT     PROBLEM LIST:  No diagnosis found.      HISTORY OF PRESENT ILLNESS:   73 y/o male with recurrent prostate cancer after radiation therapy. Patient underwent spaceoar hydrogel placement on 5/10/24 and had persistent perineal pain. This has since subsided. He started RT on 7/25/24 and completed 8/23/24. 7/19/24 started relugolix for 6 months. He presents for a follow up today. Post-void residual today is 171 cc.     Interim History  9/30/24: Denies pushing or straining to void and states he feels he occasionally experiences incomplete emptying. Patient denies any pushing or straining to urinate. NTF 4-5 times per night.  Post-void residual today is 147 cc. He utilizes 0.4mg of Flomax daily. He does take this either first thing in the morning or mid day. Denies any fevers, chills, nausea, vomiting.    11/4/24: Patient denies any pushing or straining to urinate. NTF 2-3 times per night which does bothers him, and admits that he has not cut down his liquid intake at night as dicussed.  He utilizes 0.8mg of Flomax daily which helps with his urine stream.   Denies any fevers, chills, nausea, vomiting.    12/16/24: This is a telehealth visit. Today, he reports doing well. Notes improvement in NTF. Has been adherent to Flomax as directed. He denies any fevers, chills, nausea, vomiting.    5/19/25: ***      PAST MEDICAL HISTORY:  Past Medical History:   Diagnosis Date    A-fib (Multi)     on Eliquis, pre surgery stop instructions and clearance in 2/9/24 \"letters\"    Acute hepatitis C     Genotype 1a, s/p Harvoni [id number]    Anxiety     ASCVD (arteriosclerotic cardiovascular disease)     Benign prostatic hyperplasia with lower urinary tract symptoms     Chronic pain disorder     back    CKD (chronic kidney disease)     CKD (chronic kidney disease)     Cluster headache, chronic     Elevated PSA 12/21/2023    5.97    GERD (gastroesophageal reflux disease)     Hepatitis B     + antibody    " Hyperaldosteronism     Hypercholesteremia     Hypertension     Mitral valve insufficiency     Monoclonal gammopathy     MGUS followed by Hem/Onc Dr. Ochoa    JEFFREY (obstructive sleep apnea)     Prostate cancer (Multi)     RT and ADT [id number]    Tobacco use disorder        PAST SURGICAL HISTORY:  Past Surgical History:   Procedure Laterality Date    CARDIOVERSION  12/2022    and Ablation for A Fib    COLONOSCOPY  08/26/2019    Colonoscopy    HERNIA REPAIR  11/15/2021    Hernia repair    PROSTATE BIOPSY  06/11/2014        ALLERGIES:   No Known Allergies     MEDICATIONS:     Current Outpatient Medications:     amLODIPine (Norvasc) 10 mg tablet, TAKE 1 TABLET BY MOUTH EVERY DAY, Disp: 90 tablet, Rfl: 1    apixaban (Eliquis) 5 mg tablet, Take 1 tablet (5 mg) by mouth 2 times a day., Disp: 180 tablet, Rfl: 2    fluticasone (Flonase) 50 mcg/actuation nasal spray, Administer 2 sprays into each nostril once daily., Disp: 48 g, Rfl: 3    losartan-hydrochlorothiazide (Hyzaar) 100-12.5 mg tablet, TAKE 1 TABLET BY MOUTH EVERY DAY, Disp: 90 tablet, Rfl: 1    metoprolol tartrate (Lopressor) 25 mg tablet, Take 1 tablet (25 mg) by mouth 2 times a day., Disp: 180 tablet, Rfl: 3    multivitamin tablet, Take 1 tablet by mouth once daily., Disp: , Rfl:     pravastatin (Pravachol) 40 mg tablet, Take 1 tablet (40 mg) by mouth once daily at bedtime., Disp: 30 tablet, Rfl: 11    tamsulosin (Flomax) 0.4 mg 24 hr capsule, Take 2 capsules (0.8 mg) by mouth once daily., Disp: 180 capsule, Rfl: 2      SOCIAL HISTORY:  Patient  reports that he has quit smoking. His smoking use included cigarettes. He has never used smokeless tobacco. He reports that he does not drink alcohol and does not use drugs.   Social History     Socioeconomic History    Marital status:      Spouse name: Not on file    Number of children: Not on file    Years of education: Not on file    Highest education level: Not on file   Occupational History    Not on file    Tobacco Use    Smoking status: Former     Current packs/day: 20.00     Types: Cigarettes    Smokeless tobacco: Never   Vaping Use    Vaping status: Never Used   Substance and Sexual Activity    Alcohol use: Never    Drug use: Never    Sexual activity: Yes   Other Topics Concern    Not on file   Social History Narrative    Not on file     Social Drivers of Health     Financial Resource Strain: Not on file   Food Insecurity: No Food Insecurity (6/7/2024)    Hunger Vital Sign     Worried About Running Out of Food in the Last Year: Never true     Ran Out of Food in the Last Year: Never true   Transportation Needs: Not on file   Physical Activity: Not on file   Stress: Not on file   Social Connections: Not on file   Intimate Partner Violence: Not on file   Housing Stability: Not on file       FAMILY HISTORY:  Family History   Family history unknown: Yes     REVIEW OF SYSTEMS:   Constitutional: Negative for fever and chills. Denies anorexia, weight loss.  Eyes: Negative for visual disturbance.   Respiratory: Negative for shortness of breath.    Cardiovascular: Negative for chest pain.   Gastrointestinal: Negative for nausea and vomiting.   Genitourinary: See interval history above.  Skin: Negative for rash.   Neurological: Negative for dizziness and numbness.   Psychiatric/Behavioral: Negative for confusion and decreased concentration.     PHYSICAL EXAM:  There were no vitals taken for this visit.  Constitutional: Patient appears well-developed and well-nourished. No distress.    Head: Normocephalic and atraumatic.    Neck: Normal range of motion.    Cardiovascular: Normal rate.    Pulmonary/Chest: Effort normal. No respiratory distress.   Abdominal: soft NTND  Musculoskeletal: Normal range of motion.    Neurological: Alert and oriented to person, place, and time.  Psychiatric: Normal mood and affect. Behavior is normal. Thought content normal.      LABORATORY REVIEW:     Lab Results   Component Value Date    BUN 16  04/20/2025    CREATININE 1.16 04/20/2025    EGFR 66 04/20/2025     04/20/2025    K 3.3 (L) 04/20/2025     04/20/2025    CO2 32 04/20/2025    CALCIUM 8.9 04/20/2025      Lab Results   Component Value Date    WBC 4.7 04/20/2025    RBC 4.53 04/20/2025    HGB 12.6 (L) 04/20/2025    HCT 38.0 (L) 04/20/2025    MCV 84 04/20/2025    MCH 27.8 04/20/2025    MCHC 33.2 04/20/2025    RDW 14.2 04/20/2025     04/20/2025        Lab Results   Component Value Date    PSA 0.23 04/18/2025    PSA <0.10 11/22/2024    PSA 0.17 08/28/2024    PSA 6.65 (H) 07/19/2024    PSA 6.28 (H) 04/18/2024    PSA 5.97 (H) 12/21/2023    PSA 3.46 08/17/2023    PSA 2.84 05/22/2023    PSA 3.02 02/22/2023    PSA 1.93 07/07/2022    PSA 1.75 05/19/2022    PSA 1.52 12/20/2021    PSA 0.47 06/17/2021    PSA 0.24 12/15/2020    PSA 0.37 06/15/2020    PSA 0.34 09/17/2019    PSA 0.26 12/08/2017         Assessment:     No diagnosis found.    Plan:   We discussed his presentation in detail.  Reviewed and interpreted patient's PSA trend which remains undetectable   Continue to restrict fluid intake at night.  Will continue on 0.8mg of flomax   RTC in 3 months     ***  minutes total spent on patient's care today; >50% time spent on counseling/coordination of care      Scribe Attestation  By signing my name below, Marj REGAN, Shira attest that this documentation has been prepared under the direction and in the presence of Dima Meyer MD.

## 2025-05-19 ENCOUNTER — APPOINTMENT (OUTPATIENT)
Dept: UROLOGY | Facility: HOSPITAL | Age: 75
End: 2025-05-19
Payer: MEDICARE

## 2025-05-21 ENCOUNTER — OFFICE VISIT (OUTPATIENT)
Dept: OTOLARYNGOLOGY | Facility: CLINIC | Age: 75
End: 2025-05-21
Payer: MEDICARE

## 2025-05-21 VITALS — WEIGHT: 210 LBS | HEIGHT: 72 IN | BODY MASS INDEX: 28.44 KG/M2

## 2025-05-21 DIAGNOSIS — J34.89 NASAL CAVITY MASS: ICD-10-CM

## 2025-05-21 DIAGNOSIS — D14.0 INVERTED PAPILLOMA OF MAXILLARY SINUS: Primary | ICD-10-CM

## 2025-05-21 PROCEDURE — 1159F MED LIST DOCD IN RCRD: CPT | Performed by: OTOLARYNGOLOGY

## 2025-05-21 PROCEDURE — 99203 OFFICE O/P NEW LOW 30 MIN: CPT | Performed by: OTOLARYNGOLOGY

## 2025-05-21 PROCEDURE — 31231 NASAL ENDOSCOPY DX: CPT | Performed by: OTOLARYNGOLOGY

## 2025-05-21 PROCEDURE — 3008F BODY MASS INDEX DOCD: CPT | Performed by: OTOLARYNGOLOGY

## 2025-05-21 PROCEDURE — 1036F TOBACCO NON-USER: CPT | Performed by: OTOLARYNGOLOGY

## 2025-05-21 PROCEDURE — 1160F RVW MEDS BY RX/DR IN RCRD: CPT | Performed by: OTOLARYNGOLOGY

## 2025-05-21 NOTE — PROGRESS NOTES
Referring Provider:  No referring provider defined for this encounter.      History of Present Illness:  History of Present Illness  The patient presents for evaluation of a mass in the right nasal cavity.    He was referred to our care by Dr. Hunter due to a mass in his right nasal cavity, which has been causing difficulty in breathing through the right nostril. 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 He reports no issues with breathing through his left nostril. No epistaxis or other symptoms.     He has a history of prostate cancer, which was treated in 2016 with no history of recurrence.     I reviewed his CT imaging which demonstrates an expansile mass within the right maxillary sinus with extension into the middle meatus. No evidence of orbital or soft tissue extension.     Review of Systems:     Review of symptoms was negative except for those stated including Cardiopulmonary, Genitourinary, Gastrointestinal, Psychological, Sleep pattern, Endocrine, Eyes, Neurologic, Musculoskeletal, Skin, Hematologic/Lymphatic and Allergic/Immunologic.     Medical History:     I have reviewed the patient's updated past medical history, surgical history, family history, social history, as well as current medications and allergies as of 5/6/2025. Changes to these items have been updated and marked as reviewed in the electronic medical record.    Physical Exam  Nose: A mass is present in the right cheek sinus, extending into the nasal cavity. The mass is blocking most of the space, causing congestion.      Physical Exam:     Vitals:  vitals were not taken for this visit.   General: Patient doing well overall and is in no apparent distress.  Psych: Pleasant affect, and answers questions appropriately.  Head & Face: Symmetric facial movements  Eyes: Pupils equal, round, reactive.  Extraocular movements intact without gaze restrictions or  nystagmus. No epiphora.  Ears:  External auditory canals are normal.  Tympanic membranes are clear.  No middle ear effusion is seen.  All middle ear landmarks are normal.  Nose: Anterior rhinoscopy revealed normal sinonasal mucosa. More posterior areas of the nasal cavity could not be completely examined.  Oral Cavity/Oropharynx:  Without lesions or masses to visual exam.  Neck: Supple without lymphadenopathy.  Lungs: Non-labored, and without evidence of stridor.  Cardiac: Pulses are strong, well-perfused.  Extremities: Without gross evidence of clubbing, cyanosis, or edema.  Neuro: Cranial nerves II-XII grossly intact; Intact facial movements.    Procedure:  Rigid nasal endoscopy (05283)  Pre-procedure diagnosis/Indication for procedure:  To evaluate areas not visualized on anterior rhinoscopy   Anesthesia:  None  Description:  A 0-degree 3-mm rigid nasal endoscope was used to examine the left and right nasal cavities.  The nasal valve areas were examined for abnormalities or collapse.  The inferior and middle turbinates were evaluated.  The middle and superior meatuses, and the sphenoethmoid recesses were examined and inspected for mucopurulence and polyps. Once the endoscope was withdrawn, the patient was noted to have tolerated the procedure well without complications and was returned to ambulatory status.      Findings:    Soft tissue mass in the right nasal cavity extending into the middle meatus. No abnormalities on the left.      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 Inverted papilloma.  Issues with breathing on the right side of the nose due to an inverted papilloma were discussed. The biopsy revealed the tumor is benign but has small areas of transformation into squamous cell carcinoma. The mass is occupying the right maxillary sinus and has extended into the nasal cavity, causing  congestion. Treatment options include doing nothing, which could lead to worsening symptoms and potential cancer transformation, or surgical removal of the tumor. Surgery is recommended as the most effective treatment, to be performed through the nostril, with a possible additional sublabial incision if necessary. The goal is to remove 100% of the tumor to minimize the risk of recurrence. The case will be presented at the multidisciplinary tumor board for further discussion. Postoperative radiation may be considered if more of the tumor is found to be cancerous or if complete removal is not achieved. Avoid heavy lifting and strenuous exercise for 1 to 2 weeks post-surgery. The nurse will contact within the next few days to schedule the surgery.          Maral Francisco MD, M.Eng.   of Otolaryngology - Head & Neck Surgery  Division of Rhinology and Endoscopic Skull Base Surgery  [facility]     This medical note was created with the assistance of artificial intelligence (AI) for documentation purposes. The content has been reviewed and confirmed by the healthcare provider for accuracy and completeness. Patient consented to the use of audio recording and use of AI during their visit.

## 2025-05-22 ENCOUNTER — ANESTHESIA (OUTPATIENT)
Dept: CARDIOLOGY | Facility: HOSPITAL | Age: 75
End: 2025-05-22
Payer: MEDICARE

## 2025-05-22 ENCOUNTER — HOSPITAL ENCOUNTER (OUTPATIENT)
Facility: HOSPITAL | Age: 75
Setting detail: OUTPATIENT SURGERY
Discharge: HOME | End: 2025-05-22
Attending: INTERNAL MEDICINE | Admitting: INTERNAL MEDICINE
Payer: MEDICARE

## 2025-05-22 ENCOUNTER — ANESTHESIA EVENT (OUTPATIENT)
Dept: CARDIOLOGY | Facility: HOSPITAL | Age: 75
End: 2025-05-22
Payer: MEDICARE

## 2025-05-22 VITALS — HEIGHT: 72 IN | WEIGHT: 210 LBS | BODY MASS INDEX: 28.44 KG/M2

## 2025-05-22 DIAGNOSIS — I48.0 PAROXYSMAL ATRIAL FIBRILLATION (MULTI): ICD-10-CM

## 2025-05-22 DIAGNOSIS — I10 HYPERTENSION, UNSPECIFIED TYPE: ICD-10-CM

## 2025-05-22 PROCEDURE — 2500000004 HC RX 250 GENERAL PHARMACY W/ HCPCS (ALT 636 FOR OP/ED): Mod: JZ

## 2025-05-22 PROCEDURE — A92960 PR CARDIOVERSION, ELECTIVE;EXTERN: Performed by: ANESTHESIOLOGY

## 2025-05-22 PROCEDURE — A92960 PR CARDIOVERSION, ELECTIVE;EXTERN

## 2025-05-22 PROCEDURE — 3700000002 HC GENERAL ANESTHESIA TIME - EACH INCREMENTAL 1 MINUTE: Performed by: INTERNAL MEDICINE

## 2025-05-22 PROCEDURE — 2500000005 HC RX 250 GENERAL PHARMACY W/O HCPCS

## 2025-05-22 PROCEDURE — 99100 ANES PT EXTEME AGE<1 YR&>70: CPT | Performed by: ANESTHESIOLOGY

## 2025-05-22 PROCEDURE — 3700000001 HC GENERAL ANESTHESIA TIME - INITIAL BASE CHARGE: Performed by: INTERNAL MEDICINE

## 2025-05-22 PROCEDURE — 92960 CARDIOVERSION ELECTRIC EXT: CPT | Performed by: INTERNAL MEDICINE

## 2025-05-22 PROCEDURE — 2500000001 HC RX 250 WO HCPCS SELF ADMINISTERED DRUGS (ALT 637 FOR MEDICARE OP)

## 2025-05-22 RX ORDER — LIDOCAINE HYDROCHLORIDE 20 MG/ML
INJECTION, SOLUTION INFILTRATION; PERINEURAL AS NEEDED
Status: DISCONTINUED | OUTPATIENT
Start: 2025-05-22 | End: 2025-05-22

## 2025-05-22 RX ORDER — ETOMIDATE 2 MG/ML
INJECTION INTRAVENOUS AS NEEDED
Status: DISCONTINUED | OUTPATIENT
Start: 2025-05-22 | End: 2025-05-22

## 2025-05-22 RX ORDER — LOSARTAN POTASSIUM AND HYDROCHLOROTHIAZIDE 12.5; 1 MG/1; MG/1
1 TABLET ORAL DAILY
Qty: 90 TABLET | Refills: 1 | Status: SHIPPED | OUTPATIENT
Start: 2025-05-22

## 2025-05-22 RX ORDER — ALBUTEROL SULFATE 90 UG/1
INHALANT RESPIRATORY (INHALATION) AS NEEDED
Status: DISCONTINUED | OUTPATIENT
Start: 2025-05-22 | End: 2025-05-22

## 2025-05-22 RX ADMIN — ETOMIDATE INJECTION 2 MG: 2 SOLUTION INTRAVENOUS at 09:46

## 2025-05-22 RX ADMIN — ALBUTEROL SULFATE 2 PUFF: 90 AEROSOL, METERED RESPIRATORY (INHALATION) at 09:47

## 2025-05-22 RX ADMIN — SODIUM CHLORIDE: 9 INJECTION, SOLUTION INTRAVENOUS at 09:40

## 2025-05-22 RX ADMIN — ETOMIDATE INJECTION 10 MG: 2 SOLUTION INTRAVENOUS at 09:45

## 2025-05-22 RX ADMIN — LIDOCAINE HYDROCHLORIDE 80 MG: 20 INJECTION, SOLUTION INFILTRATION; PERINEURAL at 09:45

## 2025-05-22 SDOH — HEALTH STABILITY: MENTAL HEALTH: CURRENT SMOKER: 0

## 2025-05-22 ASSESSMENT — PAIN SCALES - GENERAL: PAIN_LEVEL: 0

## 2025-05-22 NOTE — ANESTHESIA PREPROCEDURE EVALUATION
Patient: Christiano Masters    Procedure Information       Anesthesia Start Date/Time: 05/22/25 0940    Procedure: Cardioversion - CPT: 59396    Location: [facility] [id number] EP PRE/POST [place] [address] / [facility] MAT 3529 Cardiac Cath Lab    Providers: Mitch Vazquez MD            Relevant Problems   Cardiac   (+) A-fib (Multi)   (+) Abnormal EKG   (+) Atherosclerotic heart disease of native coronary artery without angina pectoris   (+) Atrial fibrillation (Multi)   (+) Atrial flutter (Multi)   (+) Atypical chest pain   (+) Episodic atrial fibrillation (Multi)   (+) Hypercholesterolemia   (+) Hypertension   (+) Nonrheumatic mitral (valve) insufficiency   (+) Other persistent atrial fibrillation      Pulmonary   (+) JEFFREY (obstructive sleep apnea)      Neuro   (+) Anxiety   (+) Panic      GI   (+) Gastro-esophageal reflux disease without esophagitis      /Renal   (+) Adenocarcinoma of prostate (Multi)   (+) Enlarged prostate with lower urinary tract symptoms (LUTS)   (+) Malignant neoplasm of prostate (Multi)      Liver   (+) Hepatitis C, chronic (Multi)      Endocrine   (+) Drug-induced obesity      Hematology   (+) Long term (current) use of anticoagulants      ID   (+) Hepatitis C, chronic (Multi)       Clinical information reviewed:    Allergies                NPO Detail:  NPO/Void Status  Date of Last Liquid: 05/21/25  Time of Last Liquid: 2200  Date of Last Solid: 05/21/25  Time of Last Solid: 2200         Physical Exam    Airway  Mallampati: IV  TM distance: >3 FB  Neck ROM: limited  Mouth opening: 3 or more finger widths     Cardiovascular   Rhythm: irregular     Dental   Comments: No loose teeth or denture     Pulmonary Comments: Some expiratory wheezes that cleared after a couple of bronchodilator puffs   Abdominal            Anesthesia Plan    History of general anesthesia?: no  History of complications of general anesthesia?: no    ASA 3     general     The patient is not a current smoker.    Anesthetic plan and risks  discussed with patient.  Use of blood products discussed with patient who.    Plan discussed with [facility].

## 2025-05-22 NOTE — H&P
"History Of Present Illness  Christiano Masters is a 74 y.o. male presenting with AF .     Past Medical History  Medical History[1]    Surgical History  Surgical History[2]     Social History  He reports that he has quit smoking. His smoking use included cigarettes. He has never used smokeless tobacco. He reports that he does not drink alcohol and does not use drugs.    Family History  Family History[3]     Allergies  Patient has no known allergies.    Review of Systems   All other systems reviewed and are negative.       Physical Exam  Vitals reviewed.          Last Recorded Vitals  Height 1.829 m (6'), weight 95.3 kg (210 lb).    Relevant Results        Assessment & Plan  Atrial fibrillation (Multi)      AF for cardioversion    I spent 35  minutes in the professional and overall care of this patient.      Mitch Vazquez MD         [1]   Past Medical History:  Diagnosis Date    A-fib (Multi)     on Eliquis, pre surgery stop instructions and clearance in 2/9/24 \"letters\"    Acute hepatitis C     Genotype 1a, s/p Harvoni [id number]    Anxiety     ASCVD (arteriosclerotic cardiovascular disease)     Benign prostatic hyperplasia with lower urinary tract symptoms     Chronic pain disorder     back    CKD (chronic kidney disease)     CKD (chronic kidney disease)     Cluster headache, chronic     Elevated PSA 12/21/2023    5.97    GERD (gastroesophageal reflux disease)     Hepatitis B     + antibody    Hyperaldosteronism     Hypercholesteremia     Hypertension     Mitral valve insufficiency     Monoclonal gammopathy     MGUS followed by Hem/Onc Dr. Ochoa    JEFFREY (obstructive sleep apnea)     Prostate cancer (Multi)     RT and ADT 5809-8270    Tobacco use disorder    [2]   Past Surgical History:  Procedure Laterality Date    CARDIOVERSION  12/2022    and Ablation for A Fib    COLONOSCOPY  08/26/2019    Colonoscopy    HERNIA REPAIR  11/15/2021    Hernia repair    PROSTATE BIOPSY  06/11/2014   [3]   Family History  Family " history unknown: Yes

## 2025-05-23 ENCOUNTER — TUMOR BOARD CONFERENCE (OUTPATIENT)
Dept: HEMATOLOGY/ONCOLOGY | Facility: HOSPITAL | Age: 75
End: 2025-05-23
Payer: MEDICARE

## 2025-05-23 DIAGNOSIS — J34.89 NASAL CAVITY MASS: Primary | ICD-10-CM

## 2025-05-25 ENCOUNTER — OFFICE VISIT (OUTPATIENT)
Dept: URGENT CARE | Age: 75
End: 2025-05-25
Payer: MEDICARE

## 2025-05-25 ENCOUNTER — ANCILLARY PROCEDURE (OUTPATIENT)
Dept: URGENT CARE | Age: 75
End: 2025-05-25
Payer: MEDICARE

## 2025-05-25 VITALS
DIASTOLIC BLOOD PRESSURE: 86 MMHG | WEIGHT: 210 LBS | OXYGEN SATURATION: 97 % | HEART RATE: 54 BPM | SYSTOLIC BLOOD PRESSURE: 150 MMHG | RESPIRATION RATE: 18 BRPM | TEMPERATURE: 97.8 F | HEIGHT: 72 IN | BODY MASS INDEX: 28.44 KG/M2

## 2025-05-25 DIAGNOSIS — R05.1 ACUTE COUGH: ICD-10-CM

## 2025-05-25 DIAGNOSIS — I10 PRIMARY HYPERTENSION: ICD-10-CM

## 2025-05-25 DIAGNOSIS — R06.2 WHEEZING: ICD-10-CM

## 2025-05-25 DIAGNOSIS — J18.9 PNEUMONIA DUE TO INFECTIOUS ORGANISM, UNSPECIFIED LATERALITY, UNSPECIFIED PART OF LUNG: Primary | ICD-10-CM

## 2025-05-25 PROCEDURE — 71046 X-RAY EXAM CHEST 2 VIEWS: CPT | Performed by: PHYSICIAN ASSISTANT

## 2025-05-25 RX ORDER — PREDNISONE 20 MG/1
40 TABLET ORAL DAILY
Qty: 8 TABLET | Refills: 0 | Status: SHIPPED | OUTPATIENT
Start: 2025-05-25 | End: 2025-05-29

## 2025-05-25 RX ORDER — METHYLPREDNISOLONE SODIUM SUCCINATE 125 MG/2ML
125 INJECTION INTRAMUSCULAR; INTRAVENOUS ONCE
Status: COMPLETED | OUTPATIENT
Start: 2025-05-25 | End: 2025-05-25

## 2025-05-25 RX ORDER — ALBUTEROL SULFATE 90 UG/1
2 INHALANT RESPIRATORY (INHALATION) EVERY 6 HOURS PRN
Qty: 18 G | Refills: 0 | Status: SHIPPED | OUTPATIENT
Start: 2025-05-25 | End: 2026-05-25

## 2025-05-25 RX ORDER — AZITHROMYCIN 250 MG/1
TABLET, FILM COATED ORAL
Qty: 6 TABLET | Refills: 0 | Status: SHIPPED | OUTPATIENT
Start: 2025-05-25 | End: 2025-05-30

## 2025-05-25 RX ORDER — ALBUTEROL SULFATE 0.83 MG/ML
2.5 SOLUTION RESPIRATORY (INHALATION) ONCE
Status: COMPLETED | OUTPATIENT
Start: 2025-05-25 | End: 2025-05-25

## 2025-05-25 RX ORDER — BENZONATATE 200 MG/1
200 CAPSULE ORAL 3 TIMES DAILY PRN
Qty: 20 CAPSULE | Refills: 0 | Status: SHIPPED | OUTPATIENT
Start: 2025-05-25

## 2025-05-25 RX ADMIN — ALBUTEROL SULFATE 2.5 MG: 0.83 SOLUTION RESPIRATORY (INHALATION) at 13:36

## 2025-05-25 RX ADMIN — METHYLPREDNISOLONE SODIUM SUCCINATE 125 MG: 125 INJECTION INTRAMUSCULAR; INTRAVENOUS at 13:37

## 2025-05-25 ASSESSMENT — ENCOUNTER SYMPTOMS: COUGH: 1

## 2025-05-25 NOTE — PATIENT INSTRUCTIONS
"Wheezing in adults    The Basics  Written by the doctors and editors at [facility]  What is wheezing?  This is when you hear a whistling sound when you breathe. It happens when your airways are swollen or blocked.  What causes wheezing?  The most common causes of wheezing are:  ? Asthma - This is a lung condition that can make it hard to breathe (figure 1). Asthma symptoms can vary a lot over time. \"Triggers\" are things that make asthma symptoms worse. Common triggers include smoke, air pollution, dust, mold, pollen, strong chemicals or smells, very cold or dry air, and exercise.  ? Chronic obstructive pulmonary disease, or \"COPD\" - This is a lung disease that gets worse with age, and is most often caused by smoking (figure 2). Chronic bronchitis and emphysema are types of COPD.  Many other things can also cause wheezing. They include:  ? An allergic reaction  ? Problems with the vocal cords or windpipe  ? Injury to the voice box  ? Mucus buildup in the airways  ? An enlarged thyroid gland  ? Infection  ? Something getting stuck in the airway  ? Narrow or crowded upper airways (especially if wheezing mostly happens when lying down)  Will I need tests?  Maybe. Your doctor or nurse will do an exam and ask about your symptoms. They might do tests, such as:  ? Breathing tests to check how air moves in and out of your lungs  ? A test to see if an inhaler medicine improves your breathing  ? Blood tests  ? Allergy skin tests  ? Chest X-ray  You might also get other tests. These can help your doctor figure out what is causing your wheezing.  How do I care for myself at home?  This depends on the cause of your wheezing. Some general tips:  ? If you have asthma or COPD, take all your medicines as instructed.  ? Avoid things that make your wheezing worse. It's especially important to avoid smoking and places where other people are smoking.  ? Use saline nose drops or spray to relieve stuffiness.  ? Use a cool mist humidifier. " "This might help if you have an upper respiratory infection, like a cold.  ? If you are coughing up mucus, try an over-the-counter cold and cough medicine. These can thin mucus and sometimes reduce the urge to cough.  ? If your wheezing was caused by an allergic reaction, avoid whatever you are allergic to. You might also get medicine to use if you have another reaction.  How is wheezing treated?  The first goal is to make sure you can breathe without trouble and get enough air and oxygen into your lungs. Some people need extra oxygen or help with their breathing.  If you have asthma or COPD, wheezing is treated with medicine to help open up the airways. This can be given using inhalers or a \"nebulizer.\" A nebulizer is a machine that lets you breathe in the medicine as a mist.  Other treatments depend on the cause of the wheezing. For example, if something is blocking your airway, doctors might need to do a procedure to remove it. If infection or inflammation in your throat or neck is narrowing your airway, you might need urgent treatment. This could include antibiotics, allergy treatments, or surgery.  When should I call the doctor?  Call for an ambulance (in the [place] and [place], call 9-1-1) if:  ? You are having trouble breathing, even when resting.  ? Your lips or fingernails turn gray or blue.  ? You feel confused, dizzy, faint, or weak due to trouble breathing.  ? You can only say 1 or 2 words at a time.  ? You have hives or a widespread, itchy skin rash along with trouble breathing.  ? Your lips or tongue are swollen.  Call your doctor or nurse for advice if:  ? You have a fever of 100.4°F (38°C) or higher, or chills.  ? You feel weak or more short of breath than usual when doing your normal activities.  ? Your wheezing gets worse, or you have new symptoms.  All topics are updated as new evidence becomes available and our peer review process is complete.   "

## 2025-05-25 NOTE — PROGRESS NOTES
Subjective   Patient ID: Christiano Masters is a 74 y.o. male. They present today with a chief complaint of Cough (Pt has cough, congestion, wheezing, 1 week ).    History of Present Illness    Cough      This is a 74-year-old male presents urgent care for cough and wheezing.  Started with cold-like symptoms 6 days ago.  He did try Flonase and over-the-counter cough medication which helped a little bit.  Feels like he is wheezing now.  Denies history of asthma or COPD.  Denies chest pain, shortness of breath, or fevers.  Past Medical History  Allergies as of 05/25/2025    (No Known Allergies)       Prescriptions Prior to Admission[1]     Medical History[2]    Surgical History[3]     reports that he has quit smoking. His smoking use included cigarettes. He has never used smokeless tobacco. He reports that he does not drink alcohol and does not use drugs.    Review of Systems  Review of Systems   Respiratory:  Positive for cough.    All other systems reviewed and are negative.                                 Objective    Vitals:    05/25/25 1305 05/25/25 1310   BP: 156/88 150/86   BP Location: Left arm Right arm   Patient Position: Sitting Sitting   BP Cuff Size: Adult Adult   Pulse: 53 54   Resp: 18    Temp: 36.6 °C (97.8 °F)    TempSrc: Oral    SpO2: 96% 97%   Weight: 95.3 kg (210 lb)    Height: 1.829 m (6')      No LMP for male patient.    Physical Exam  Physical Exam:    General: Vitals noted no distress. Afebrile. Normal phonation, no stridor, no trismus    ENT: Left TM is unremarkable. Right TM is unremarkable. Left external auditory canal is unremarkable. Right external auditory canal is unremarkable. Mastoids nontender. Normal conjunctival. Eyes are PERRLA. Posterior oropharynx without exudate or swelling.     Neck: Supple.     Cardiac: Regular rate rhythm    Lungs: Patient is diminished and does have expiratory wheezing.  He is able to communicate in full sentences.  Is no accessory muscle usage.    Abdomen:  Soft, nontender, nonsurgical throughout.     Extremities: No peripheral edema    Skin: No rash    Neuro: No gross neurological deficits      Procedures    Point of Care Test & Imaging Results from this visit  No results found for this visit on 05/25/25.   Imaging  XR chest 2 views  Result Date: 5/25/2025  1. Increased lung markings bilaterally suggestive of atypical/viral pneumonia versus developing consolidation.  Radiographic follow-up to resolution in 2-3 weeks is recommended.       MACRO: None   Signed by: Syed Sanon 5/25/2025 1:55 PM Dictation workstation:   [id number]      Cardiology, Vascular, and Other Imaging  No other imaging results found for the past 2 days      Diagnostic study results (if any) were reviewed by Tadeo Woody PA-C.    Assessment/Plan   Allergies, medications, history, and pertinent labs/EKGs/Imaging reviewed by Tadeo Woody PA-C.     Medical Decision Making  MDM:      Summary: This is a 74-year-old male presents urgent care for cough/wheezing. Vital signs were reviewed. Patient is well-appearing nontoxic on exam. Differential diagnosis includes but not limited to viral infection, bronchitis, pneumonia, asthma.  Did receive 125 Solu-Medrol and albuterol breathing treatment.  Blood pressure 150/86.  Advised to follow-up with his PCP regarding this.  X-ray demonstrates viral/atypical pneumonia per radiology read.  Was given azithromycin, prednisone, butyryl inhaler, and Tessalon Perles.  He feels better after breathing treatment.  Lungs clear.  Stable for discharge. Follow-up with PCP. Return to urgent care or go to the emergency department if symptoms worsen or if new symptoms develop.    Orders and Diagnoses  Diagnoses and all orders for this visit:  Pneumonia due to infectious organism, unspecified laterality, unspecified part of lung  -     azithromycin (Zithromax) 250 mg tablet; Take 2 tablets (500 mg) by mouth once daily for 1 day, THEN 1 tablet (250 mg) once daily for 4 days.  "Take 2 tabs (500 mg) by mouth today, then take 1 tab daily for 4 days.  Acute cough  -     XR chest 2 views; Future  Wheezing  -     methylPREDNISolone sodium succinate (PF) (SOLU-Medrol) injection 125 mg  -     albuterol 2.5 mg /3 mL (0.083 %) nebulizer solution 2.5 mg  -     XR chest 2 views; Future  -     predniSONE (Deltasone) 20 mg tablet; Take 2 tablets (40 mg) by mouth once daily for 4 days.  -     albuterol 90 mcg/actuation inhaler; Inhale 2 puffs every 6 hours if needed for wheezing.  -     benzonatate (Tessalon) 200 mg capsule; Take 1 capsule (200 mg) by mouth 3 times a day as needed for cough. Do not crush or chew.  Primary hypertension  -     Referral to Primary Care; Future      Medical Admin Record  Administrations This Visit       albuterol 2.5 mg /3 mL (0.083 %) nebulizer solution 2.5 mg       Admin Date  05/25/2025 Action  Given Dose  2.5 mg Route  nebulization Documented By  Jairon Meyer MA              methylPREDNISolone sodium succinate (PF) (SOLU-Medrol) injection 125 mg       Admin Date  05/25/2025 Action  Given Dose  125 mg Route  intramuscular Documented By  Jairon Myeer MA                    Patient disposition: Home    Electronically signed by Tadeo Woody PA-C  2:22 PM           [1] (Not in a hospital admission)   [2]   Past Medical History:  Diagnosis Date    A-fib (Multi)     on Eliquis, pre surgery stop instructions and clearance in 2/9/24 \"letters\"    Acute hepatitis C     Genotype 1a, s/p Harvoni [id number]    Anxiety     ASCVD (arteriosclerotic cardiovascular disease)     Benign prostatic hyperplasia with lower urinary tract symptoms     Chronic pain disorder     back    CKD (chronic kidney disease)     CKD (chronic kidney disease)     Cluster headache, chronic     Elevated PSA 12/21/2023    5.97    GERD (gastroesophageal reflux disease)     Hepatitis B     + antibody    Hyperaldosteronism     Hypercholesteremia     Hypertension     Mitral valve insufficiency     Monoclonal " gammopathy     MGUS followed by Hem/Onc Dr. Ochoa    JEFFREY (obstructive sleep apnea)     Prostate cancer (Multi)     RT and ADT 6615-9885    Tobacco use disorder    [3]   Past Surgical History:  Procedure Laterality Date    CARDIAC ELECTROPHYSIOLOGY PROCEDURE N/A 5/22/2025    Procedure: Cardioversion;  Surgeon: Mitch Vazquez MD;  Location: [place] [address] Cardiac [facility];  Service: Electrophysiology;  Laterality: N/A;  CPT: 43248    CARDIOVERSION  12/2022    and Ablation for A Fib    COLONOSCOPY  08/26/2019    Colonoscopy    HERNIA REPAIR  11/15/2021    Hernia repair    PROSTATE BIOPSY  06/11/2014

## 2025-05-27 DIAGNOSIS — C31.0 SQUAMOUS CELL CARCINOMA OF MAXILLARY SINUS: ICD-10-CM

## 2025-05-27 DIAGNOSIS — D14.0 INVERTED PAPILLOMA OF MAXILLARY SINUS: ICD-10-CM

## 2025-05-27 NOTE — PROGRESS NOTES
"UROLOGIC FOLLOW-UP VISIT     PROBLEM LIST:  1. Malignant neoplasm of prostate (Multi)        2. Lower urinary tract symptoms (LUTS)          HISTORY OF PRESENT ILLNESS:   73 y/o male with recurrent prostate cancer after radiation therapy. Patient underwent spaceoar hydrogel placement on 5/10/24 and had persistent perineal pain. This has since subsided. He started RT on 7/25/24 and completed 8/23/24. 7/19/24 started relugolix for 6 months. He presents for a follow up today. Post-void residual today is 171 cc.     Interim History  9/30/24: Denies pushing or straining to void and states he feels he occasionally experiences incomplete emptying. Patient denies any pushing or straining to urinate. NTF 4-5 times per night.  Post-void residual today is 147 cc. He utilizes 0.4mg of Flomax daily. He does take this either first thing in the morning or mid day. Denies any fevers, chills, nausea, vomiting.    11/4/24: Patient denies any pushing or straining to urinate. NTF 2-3 times per night which does bothers him, and admits that he has not cut down his liquid intake at night as dicussed.  He utilizes 0.8mg of Flomax daily which helps with his urine stream.   Denies any fevers, chills, nausea, vomiting.    12/16/24: This is a telehealth visit. Today, he reports doing well. Notes improvement in NTF. Has been adherent to Flomax as directed. He denies any fevers, chills, nausea, vomiting.    5/28/25: Pt states since his last appointment he's had issues with sinusitis and had to also undergo cardioversion. Re: his urination he states his stream has been strong and he's been able to empty without difficulty. Patient does continue to endorse nocturia 4x/nightly      PAST MEDICAL HISTORY:  Past Medical History:   Diagnosis Date    A-fib (Multi)     on Eliquis, pre surgery stop instructions and clearance in 2/9/24 \"letters\"    Acute hepatitis C     Genotype 1a, s/p Harvoni [id number]    Anxiety     ASCVD (arteriosclerotic " cardiovascular disease)     Benign prostatic hyperplasia with lower urinary tract symptoms     Chronic pain disorder     back    CKD (chronic kidney disease)     CKD (chronic kidney disease)     Cluster headache, chronic     Elevated PSA 12/21/2023    5.97    GERD (gastroesophageal reflux disease)     Hepatitis B     + antibody    Hyperaldosteronism     Hypercholesteremia     Hypertension     Mitral valve insufficiency     Monoclonal gammopathy     MGUS followed by Hem/Onc Dr. Ochoa    JEFFREY (obstructive sleep apnea)     Prostate cancer (Multi)     RT and ADT 4437-0342    Tobacco use disorder        PAST SURGICAL HISTORY:  Past Surgical History:   Procedure Laterality Date    CARDIAC ELECTROPHYSIOLOGY PROCEDURE N/A 5/22/2025    Procedure: Cardioversion;  Surgeon: Mitch Vazquez MD;  Location: [place] [address] Cardiac Cath Lab;  Service: Electrophysiology;  Laterality: N/A;  CPT: 88656    CARDIOVERSION  12/2022    and Ablation for A Fib    COLONOSCOPY  08/26/2019    Colonoscopy    HERNIA REPAIR  11/15/2021    Hernia repair    PROSTATE BIOPSY  06/11/2014        ALLERGIES:   No Known Allergies     MEDICATIONS:     Current Outpatient Medications:     albuterol 90 mcg/actuation inhaler, Inhale 2 puffs every 6 hours if needed for wheezing., Disp: 18 g, Rfl: 0    amLODIPine (Norvasc) 10 mg tablet, TAKE 1 TABLET BY MOUTH EVERY DAY, Disp: 90 tablet, Rfl: 1    apixaban (Eliquis) 5 mg tablet, Take 1 tablet (5 mg) by mouth 2 times a day., Disp: 180 tablet, Rfl: 2    azithromycin (Zithromax) 250 mg tablet, Take 2 tablets (500 mg) by mouth once daily for 1 day, THEN 1 tablet (250 mg) once daily for 4 days. Take 2 tabs (500 mg) by mouth today, then take 1 tab daily for 4 days., Disp: 6 tablet, Rfl: 0    benzonatate (Tessalon) 200 mg capsule, Take 1 capsule (200 mg) by mouth 3 times a day as needed for cough. Do not crush or chew., Disp: 20 capsule, Rfl: 0    fluticasone (Flonase) 50 mcg/actuation nasal spray, Administer 2 sprays into  each nostril once daily., Disp: 48 g, Rfl: 3    losartan-hydrochlorothiazide (Hyzaar) 100-12.5 mg tablet, Take 1 tablet by mouth once daily., Disp: 90 tablet, Rfl: 1    metoprolol tartrate (Lopressor) 25 mg tablet, Take 1 tablet (25 mg) by mouth 2 times a day., Disp: 180 tablet, Rfl: 3    multivitamin tablet, Take 1 tablet by mouth once daily., Disp: , Rfl:     pravastatin (Pravachol) 40 mg tablet, Take 1 tablet (40 mg) by mouth once daily at bedtime., Disp: 30 tablet, Rfl: 11    predniSONE (Deltasone) 20 mg tablet, Take 2 tablets (40 mg) by mouth once daily for 4 days., Disp: 8 tablet, Rfl: 0    tamsulosin (Flomax) 0.4 mg 24 hr capsule, Take 2 capsules (0.8 mg) by mouth once daily., Disp: 180 capsule, Rfl: 2      SOCIAL HISTORY:  Patient  reports that he has quit smoking. His smoking use included cigarettes. He has never used smokeless tobacco. He reports that he does not drink alcohol and does not use drugs.   Social History     Socioeconomic History    Marital status:      Spouse name: Not on file    Number of children: Not on file    Years of education: Not on file    Highest education level: Not on file   Occupational History    Not on file   Tobacco Use    Smoking status: Former     Current packs/day: 20.00     Types: Cigarettes    Smokeless tobacco: Never   Vaping Use    Vaping status: Never Used   Substance and Sexual Activity    Alcohol use: Never    Drug use: Never    Sexual activity: Yes   Other Topics Concern    Not on file   Social History Narrative    Not on file     Social Drivers of Health     Financial Resource Strain: Not on file   Food Insecurity: No Food Insecurity (6/7/2024)    Hunger Vital Sign     Worried About Running Out of Food in the Last Year: Never true     Ran Out of Food in the Last Year: Never true   Transportation Needs: Not on file   Physical Activity: Not on file   Stress: Not on file   Social Connections: Not on file   Intimate Partner Violence: Not on file   Housing  Stability: Not on file       FAMILY HISTORY:  Family History   Family history unknown: Yes     REVIEW OF SYSTEMS:   Constitutional: Negative for fever and chills. Denies anorexia, weight loss.  Eyes: Negative for visual disturbance.   Respiratory: Negative for shortness of breath.    Cardiovascular: Negative for chest pain.   Gastrointestinal: Negative for nausea and vomiting.   Genitourinary: See interval history above.  Skin: Negative for rash.   Neurological: Negative for dizziness and numbness.   Psychiatric/Behavioral: Negative for confusion and decreased concentration.     PHYSICAL EXAM:  There were no vitals taken for this visit.  Constitutional: Patient appears well-developed and well-nourished. No distress.    Head: Normocephalic and atraumatic.    Neck: Normal range of motion.    Cardiovascular: Normal rate.    Pulmonary/Chest: Effort normal. No respiratory distress.   Abdominal: soft NTND  Musculoskeletal: Normal range of motion.    Neurological: Alert and oriented to person, place, and time.  Psychiatric: Normal mood and affect. Behavior is normal. Thought content normal.      LABORATORY REVIEW:     Lab Results   Component Value Date    BUN 16 04/20/2025    CREATININE 1.16 04/20/2025    EGFR 66 04/20/2025     04/20/2025    K 3.3 (L) 04/20/2025     04/20/2025    CO2 32 04/20/2025    CALCIUM 8.9 04/20/2025      Lab Results   Component Value Date    WBC 4.7 04/20/2025    RBC 4.53 04/20/2025    HGB 12.6 (L) 04/20/2025    HCT 38.0 (L) 04/20/2025    MCV 84 04/20/2025    MCH 27.8 04/20/2025    MCHC 33.2 04/20/2025    RDW 14.2 04/20/2025     04/20/2025        Lab Results   Component Value Date    PSA 0.23 04/18/2025    PSA <0.10 11/22/2024    PSA 0.17 08/28/2024    PSA 6.65 (H) 07/19/2024    PSA 6.28 (H) 04/18/2024    PSA 5.97 (H) 12/21/2023    PSA 3.46 08/17/2023    PSA 2.84 05/22/2023    PSA 3.02 02/22/2023    PSA 1.93 07/07/2022    PSA 1.75 05/19/2022    PSA 1.52 12/20/2021    PSA 0.47  06/17/2021    PSA 0.24 12/15/2020    PSA 0.37 06/15/2020    PSA 0.34 09/17/2019    PSA 0.26 12/08/2017         Assessment:     1. Malignant neoplasm of prostate (Multi)        2. Lower urinary tract symptoms (LUTS)            Plan:   Reviewed and interpreted patient's PSA trend no evidence of recurrence  Counseled patient on his urinary sx and behavioral changes, if no improvement will consider TURP  Continue to restrict fluid intake at night.  Will continue on 0.8mg of flomax   RTC in 3 months     31  minutes total spent on patient's care today; >50% time spent on counseling/coordination of care      Scribe Attestation  By signing my name below, Marj REGAN Scribe attest that this documentation has been prepared under the direction and in the presence of Dima Meyer MD.

## 2025-05-28 ENCOUNTER — OFFICE VISIT (OUTPATIENT)
Dept: UROLOGY | Facility: HOSPITAL | Age: 75
End: 2025-05-28
Payer: MEDICARE

## 2025-05-28 DIAGNOSIS — R39.9 LOWER URINARY TRACT SYMPTOMS (LUTS): ICD-10-CM

## 2025-05-28 DIAGNOSIS — C61 MALIGNANT NEOPLASM OF PROSTATE (MULTI): Primary | ICD-10-CM

## 2025-05-28 PROCEDURE — 99214 OFFICE O/P EST MOD 30 MIN: CPT | Performed by: STUDENT IN AN ORGANIZED HEALTH CARE EDUCATION/TRAINING PROGRAM

## 2025-05-28 PROCEDURE — G2211 COMPLEX E/M VISIT ADD ON: HCPCS | Performed by: STUDENT IN AN ORGANIZED HEALTH CARE EDUCATION/TRAINING PROGRAM

## 2025-05-28 PROCEDURE — 1159F MED LIST DOCD IN RCRD: CPT | Performed by: STUDENT IN AN ORGANIZED HEALTH CARE EDUCATION/TRAINING PROGRAM

## 2025-05-29 NOTE — TUMOR BOARD NOTE
[facility] HEAD AND NECK TUMOR BOARD NOTE:    Christiano Masters Is a 74 y.o. male who was presented by Dr. Francisco at [facility] Head & Neck Tumor Board on 05/30/25  which included representatives from all Head & Neck disciplines (Medical oncology/Radiation oncology/Otolaryngology/Radiology/Pathology).     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 male who was seen by an outside ENT for unilateral nasal obstruction.  He was ultimately found to have a right nasal cavity mass.  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  He was referred to Dr. Francisco for additional management.  An in office scope showed this right nasal cavity mass extending into the middle meatus.    Physical Exam: 5/2025    Nose: A mass is present in the right cheek sinus, extending into the nasal cavity. The mass is blocking most of the space, causing congestion.      Physical Exam:     Vitals:  vitals were not taken for this visit.   General: Patient doing well overall and is in no apparent distress.  Psych: Pleasant affect, and answers questions appropriately.  Head & Face: Symmetric facial movements  Eyes: Pupils  equal, round, reactive.  Extraocular movements intact without gaze restrictions or nystagmus. No epiphora.  Ears:  External auditory canals are normal.  Tympanic membranes are clear.  No middle ear effusion is seen.  All middle ear landmarks are normal.  Nose: Anterior rhinoscopy revealed normal sinonasal mucosa. More posterior areas of the nasal cavity could not be completely examined.  Oral Cavity/Oropharynx:  Without lesions or masses to visual exam.  Neck: Supple without lymphadenopathy.  Lungs: Non-labored, and without evidence of stridor.  Cardiac: Pulses are strong, well-perfused.  Extremities: Without gross evidence of clubbing, cyanosis, or edema.  Neuro: Cranial nerves II-XII grossly intact; Intact facial movements.       Imaging:  CT Sinus 4/2025    IMPRESSION:  1. The right maxillary sinus is completely opacified with material  extending into the right middle meatus as noted above. Mucocele is  not excluded.      2. Mild mucosal thickening and partial opacifications of the  paranasal sinuses elsewhere most prominent in the anterior right  ethmoid region.      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     Note: Multiple deeper levels were examined.        : Dr. Conrado Hurt        National site-specific guidelines were discussed with respect to the case.

## 2025-05-30 ENCOUNTER — APPOINTMENT (OUTPATIENT)
Dept: HEMATOLOGY/ONCOLOGY | Facility: HOSPITAL | Age: 75
End: 2025-05-30
Payer: MEDICARE

## 2025-05-30 DIAGNOSIS — J34.89 NASAL CAVITY MASS: Primary | ICD-10-CM

## 2025-06-02 NOTE — CPM/PAT H&P
CPM/PAT Evaluation       Name: Christiano Masters (Christiano Masters)  /Age: 1950/74 y.o.     Visit Type:   In-Person       Chief Complaint: Perioperative visit     HPI 73 y/o male scheduled for Endoscopic resection of sinonasl mass on 25 secondary to Inverted papilloma of maxillary sinus, Squamous cell carcinoma of maxillary sinus with Dr. JUDY MALDONADO who referred to [facility].  Presents to [facility] today for perioperative risk stratification and optimization. PMHX includes A.Fib on Eliquis, Acute hepatitis C, anxiety, arteriosclerotic cardiovascular disease, BPH, CKD, GERD, HTN, mitral valve insufficiency, prostate CA, hepatitis B + and monoclonal gammopathy.    PCP: Srinivas Pierce PA-C  Specialists: Dr. Mitch Vazquez - Cardiology-Electrophysiology      Medical History[1]    Surgical History[2]    Patient  reports being sexually active.    Family History[3]    Allergies[4]    Prior to Admission medications    Medication Sig Start Date End Date Taking? Authorizing Provider   albuterol 90 mcg/actuation inhaler Inhale 2 puffs every 6 hours if needed for wheezing. 25  Tadeo Woody PA-C   amLODIPine (Norvasc) 10 mg tablet TAKE 1 TABLET BY MOUTH EVERY DAY 25   Srinivas Pierce PA-C   apixaban (Eliquis) 5 mg tablet Take 1 tablet (5 mg) by mouth 2 times a day. 25   Mitch Vazquez MD   azithromycin (Zithromax) 250 mg tablet Take 2 tablets (500 mg) by mouth once daily for 1 day, THEN 1 tablet (250 mg) once daily for 4 days. Take 2 tabs (500 mg) by mouth today, then take 1 tab daily for 4 days. 25  Tadeo Woody PA-C   benzonatate (Tessalon) 200 mg capsule Take 1 capsule (200 mg) by mouth 3 times a day as needed for cough. Do not crush or chew. 25   Tadeo Woody PA-C   fluticasone (Flonase) 50 mcg/actuation nasal spray Administer 2 sprays into each nostril once daily. 25  Gilmer Elizabeth, DO   losartan-hydrochlorothiazide (Hyzaar) 100-12.5 mg tablet Take 1 tablet by  mouth once daily. 5/22/25   Srinivas Pierce PA-C   metoprolol tartrate (Lopressor) 25 mg tablet Take 1 tablet (25 mg) by mouth 2 times a day. 7/2/24 7/2/25  Gold lAicea MD   multivitamin tablet Take 1 tablet by mouth once daily.    Historical Provider, MD   pravastatin (Pravachol) 40 mg tablet Take 1 tablet (40 mg) by mouth once daily at bedtime. 2/8/24 4/23/25  Ari Arnold MD   predniSONE (Deltasone) 20 mg tablet Take 2 tablets (40 mg) by mouth once daily for 4 days. 5/25/25 5/29/25  Tadeo Woody PA-C   tamsulosin (Flomax) 0.4 mg 24 hr capsule Take 2 capsules (0.8 mg) by mouth once daily. 9/30/24 6/27/25  Dima Meyer MD        PAT ROS:   Constitutional:   neg    Neuro/Psych:   neg    Eyes:   neg    Ears:   neg    Nose:   neg    Mouth:   neg    Throat:   neg    Neck:   neg    Cardio:   neg    Respiratory:   neg    Endocrine:   neg    GI:   neg    :   neg    Musculoskeletal:   neg    Hematologic:   neg    Skin:  neg        Physical Exam  Vitals reviewed.   Constitutional:       Appearance: Normal appearance. He is obese.   HENT:      Head: Normocephalic.      Nose: Nose normal.      Mouth/Throat:      Pharynx: Oropharynx is clear.   Eyes:      Pupils: Pupils are equal, round, and reactive to light.   Cardiovascular:      Rate and Rhythm: Normal rate.      Pulses: Normal pulses.      Heart sounds: Normal heart sounds.   Pulmonary:      Effort: Pulmonary effort is normal.      Breath sounds: Normal breath sounds.   Genitourinary:     Comments: Not examined   Musculoskeletal:         General: Normal range of motion.      Cervical back: Normal range of motion.   Skin:     General: Skin is warm and dry.   Neurological:      General: No focal deficit present.      Mental Status: He is alert and oriented to person, place, and time.   Psychiatric:         Mood and Affect: Mood normal.         Behavior: Behavior normal.         Thought Content: Thought content normal.         Judgment: Judgment normal.           PAT AIRWAY:   Airway:     Mallampati::  III    TM distance::  >3 FB    Neck ROM::  Full   Lower partial, and upper    partials        Visit Vitals  /85   Pulse 58   Temp 36.2 °C (97.2 °F)   Ht 1.829 m (6')   Wt 104 kg (229 lb 3.2 oz)   SpO2 96%   BMI 31.09 kg/m²   Smoking Status Former   BSA 2.3 m²       DASI Risk Score      Flowsheet Row Pre-Admission Testing from 6/3/2025 in [facility]   Can you take care of yourself (eat, dress, bathe, or use toilet)?  2.75 filed at 06/03/2025 0807   Can you walk indoors, such as around your house? 1.75 filed at 06/03/2025 0807   Can you walk a block or two on level ground?  2.75 filed at 06/03/2025 0807   Can you climb a flight of stairs or walk up a hill? 0 filed at 06/03/2025 0807   Can you run a short distance? 0 filed at 06/03/2025 0807   Can you do light work around the house like dusting or washing dishes? 2.7 filed at 06/03/2025 0807   Can you do moderate work around the house like vacuuming, sweeping floors or carrying groceries? 3.5 filed at 06/03/2025 0807   Can you do heavy work around the house like scrubbing floors or lifting and moving heavy furniture?  8 filed at 06/03/2025 0807   Can you do yard work like raking leaves, weeding or pushing a mower? 0 filed at 06/03/2025 0807   Can you have sexual relations? 5.25 filed at 06/03/2025 0807   Can you participate in moderate recreational activities like golf, bowling, dancing, doubles tennis or throwing a baseball or football? 6 filed at 06/03/2025 0807   Can you participate in strenous sports like swimming, singles tennis, football, basketball, or skiing? 0 filed at 06/03/2025 0807   DASI SCORE 32.7 filed at 06/03/2025 0807   METS Score (Will be calculated only when all the questions are answered) 6.8 filed at 06/03/2025 0807          Caprini DVT Assessment      Flowsheet Row Pre-Admission Testing from 6/3/2025 in [facility] Pre-Admission Testing from 2/29/2024 in [facility] with JAYSHREE Chang   DVT Score (IF A SCORE IS NOT CALCULATING, MUST SELECT A BMI TO COMPLETE) 8 filed at 06/03/2025 0731 6 filed at 02/29/2024 1130   Medical Factors Present cancer, chemotherapy, or previous malignancy filed at 06/03/2025 0731 --   Surgical Factors Major surgery planned, lasting 2-3 hours filed at 06/03/2025 0731 --   BMI (BMI MUST BE CHOSEN) 30 or less filed at 06/03/2025 0731 31-40 (Obesity) filed at 02/29/2024 1130   RETIRED: Current Status -- Minor surgery planned filed at 02/29/2024 1130   RETIRED: Age -- 60-75 years filed at 02/29/2024 1130          Modified Frailty Index      Flowsheet Row Pre-Admission Testing from 6/3/2025 in [facility]   Non-independent functional status (problems with dressing, bathing, personal grooming, or cooking) 0 filed at 06/03/2025 0848   History of diabetes mellitus  0 filed at 06/03/2025 0848   History of COPD 0 filed at 06/03/2025 0848   History of CHF No filed at 06/03/2025 0848   History of MI 0 filed at 06/03/2025 0848   History of Percutaneous Coronary Intervention, Cardiac Surgery, or Angina No filed at 06/03/2025 0848   Hypertension requiring the use of medication  0.0909 filed at 06/03/2025 0848   Peripheral vascular disease 0 filed at 06/03/2025 0848   Impaired sensorium (cognitive impairement or loss, clouding, or delirium) 0 filed at 06/03/2025 0848   TIA or CVA withouy residual deficit 0 filed at 06/03/2025 0848   Cerebrovascular accident with deficit 0 filed at 06/03/2025 0848   Modified Frailty Index Calculator .0909 filed at 06/03/2025 0848          [id number] Stroke Risk Points  Current as of 18 minutes ago        2 0 to 9 Points:      No Change          The [id number] risk score (Lip PAUL, et al. 2009. © 2010 [employer]) quantifies the risk of stroke for a patient with atrial fibrillation. For patients without atrial fibrillation or under the age of 18 this score appears  as N/A. Higher score values generally indicate higher risk of stroke.          Points Metrics   0 Has Congestive Heart Failure:  No     Patients with congestive heart failure get 1 point.    Current as of 18 minutes ago   1 Has Hypertension:  Yes     Patients with hypertension get 1 point.    Current as of 18 minutes ago   1 Age:  74     Patients 65 to 74 years old get 1 point, or patients 75 years and older get 2 points.    Current as of 18 minutes ago   0 Has Diabetes:  No     Patients with diabetes get 1 point.    Current as of 18 minutes ago   0 Had Stroke:  No  Had TIA:  No  Had Thromboembolism:  No     Patients who have had a stroke, TIA, or thromboembolism get 2 points.    Current as of 18 minutes ago   0 Has Vascular Disease:  No     Patients with vascular disease get 1 point.    Current as of 18 minutes ago   0 Clinically Relevant Sex:  Male     Patients with a clinically relevant sex of Female get 1 point.    Current as of 18 minutes ago             Revised Cardiac Risk Index      Flowsheet Row Pre-Admission Testing from 6/3/2025 in [facility]   High-Risk Surgery (Intraperitoneal, Intrathoracic,Suprainguinal vascular) 0 filed at 06/03/2025 0732   History of ischemic heart disease (History of MI, History of positive exercuse test, Current chest paint considered due to myocardial ischemia, Use of nitrate therapy, ECG with pathological Q Waves) 1 filed at 06/03/2025 0732   History of congestive heart failure (pulmonary edemia, bilateral rales or S3 gallop, Paroxysmal nocturnal dyspnea, CXR showing pulmonary vascular redistribution) 0 filed at 06/03/2025 0732   History of cerebrovascular disease (Prior TIA or stroke) 0 filed at 06/03/2025 0732   Pre-operative insulin treatment 0 filed at 06/03/2025 0732   Pre-operative creatinine>2 mg/dl 0 filed at 06/03/2025 0732   Revised Cardiac Risk Calculator 1 filed at 06/03/2025 0732          Apfel Simplified Score      Flowsheet Row Pre-Admission  Testing from 6/3/2025 in [facility]   Smoking status 1 filed at 06/03/2025 0848   History of motion sickness or PONV  0 filed at 06/03/2025 0848   Use of postoperative opioids 1 filed at 06/03/2025 0848   Gender - Female 0=No filed at 06/03/2025 0848   Apfel Simplified Score Calculator 2 filed at 06/03/2025 0848          Risk Analysis Index Results This Encounter    No data found in the last 10 encounters.       Stop Bang Score      Flowsheet Row Pre-Admission Testing from 6/3/2025 in [facility]   Do you snore loudly? 1 filed at 06/03/2025 0810   Do you often feel tired or fatigued after your sleep? 1 filed at 06/03/2025 0810   Has anyone ever observed you stop breathing in your sleep? 0 filed at 06/03/2025 0810   Do you have or are you being treated for high blood pressure? 1 filed at 06/03/2025 0810   Recent BMI (Calculated) 28.5 filed at 06/03/2025 0810   Is BMI greater than 35 kg/m2? 0=No filed at 06/03/2025 0810   Age older than 50 years old? 1=Yes filed at 06/03/2025 0810   Is your neck circumference greater than 17 inches (Male) or 16 inches (Female)? 1 filed at 06/03/2025 0810   Gender - Male 1=Yes filed at 06/03/2025 0810   STOP-BANG Total Score 6 filed at 06/03/2025 0810          Prodigy: High Risk  Total Score: 20              Prodigy Age Score      Prodigy Gender Score          ARISCAT Score for Postoperative Pulmonary Complications      Flowsheet Row Pre-Admission Testing from 6/3/2025 in [facility]   Age Calculated Score 3 filed at 06/03/2025 0849   Preoperative SpO2 0 filed at 06/03/2025 0849   Respiratory infection in the last month Either upper or lower (i.e., URI, bronchitis, pneumonia), with fever and antibiotic treatment 17 filed at 06/03/2025 0849   Preoperative anemia (Hgb less than 10 g/dl) 0 filed at 06/03/2025 0849   Surgical incision  0 filed at 06/03/2025 0849   Duration of surgery  16 filed at 06/03/2025 0849   Emergency Procedure  0  filed at 06/03/2025 0849   ARISCAT Total Score  36 filed at 06/03/2025 0849          Emmie Perioperative Risk for Myocardial Infarction or Cardiac Arrest (EMIGDIO)    No data to display         Assessment and Plan:     Anesthesia  The patient denies problems with anesthesia in the past such as PONV, prolonged sedation, awareness, dental damage, aspiration, cardiac arrest, difficult intubation, or unexpected hospital admissions.     Airway  No documented or reported history of airway difficulty.     Neurology  The patient has no neurological diagnoses or significant findings on chart review, clinical presentation, and evaluation. No grossly apparent neurological perioperative risk. The patient is at increased risk for postoperative delirium secondary to age 65 or older. The patient is at increased risk for perioperative stroke secondary to a-fib, chronic renal failure, cardiac disease, hypertension , perioperative interruption of antithrombotic, increased age, hyperlipidemia, general anesthesia, operative time >2.5 hours. Handouts for preoperative brain exercises given to patient.    HEENT  #Inverted papilloma right nasal cavity  - Has seen Dr. Francisco with otolaryngology  - Planning for a endoscopic resection of sinonasal mass    Nasal endoscopy:  Soft tissue mass in the right nasal cavity extending into the middle meatus. No abnormalities on the left.     - Biopsy: Inverted papilloma, high grade dysplasia,  with focal areas of transformation to squamous cell carcinoma     Cardiovascular  #Atrial fibrillation  - Managed on metoprolol (continue) and eliquis (hold 6/14)  - S/p PVI in December of 2022 with a recurrence in April 2025  - Had recent cardioversion with Dr. Vazquez on 5/22/25 due to trial of stopping Eliquis, Eliquis was reinitiated   -Cardiac clearance requested from Dr. Vazquez d/t recent cardioversion   From Dr. Vazquez - Yes it is cleared and is mild risk. But we usually do not recommend stopping the  Eliquis for 4 weeks post cardioversion which would be June 22.   I let Dr. Francisco know about the Natalie recommendation     #CAD  - Echo 7/29/2015:   1. The left ventricular systolic function is normal.   2. Slightly elevated RVSP.   3. There is moderate tricuspid regurgitation.   4. There is moderate pulmonic valve regurgitation.  - Echo order placed for 6/6 @ 10:00 am    BP: 140/85  EKG 6/3/25:  Sinus Bradycardia   Incomplete right bundle branch block  Minimal criteria for LVH, may be normal variant  Borderline ECG    #HTN  - Managed on amlodipine (continue) and losartan/hydrochlorothiazide (hold 24 hr prior to surgery)    #HLD   - Managed on pravastatin (continue)     METS  The patient's functional capacity is greater than 4 METS.  RCRI  The patient meets 1 RCRI criteria and therefore has a 6% risk of major adverse cardiac complications.  EMIGDIO score which indicates a 1.3% risk of intraoperative or 30-day postoperative MACE (major adverse cardiac event).    He is scheduled for non-cardiac surgery associated with elevated risk. The patient has no major cardiac contraindications to non- cardiac surgery.    Pulmonary  #JEFFREY, not using CPAP    -Had recent URI went to urgent care on 5/25/25, was prescribed Zithromax, solu-medrol and inhaler. On exam today denies any respiratory symptoms, no wheezing noted   - will order repeat CXR for 6/13/25 and I will update the surgeon     XR chest 2 views  Result Date: 5/25/2025  1. Increased lung markings bilaterally suggestive of atypical/viral pneumonia versus developing consolidation. Radiographic follow-up to resolution in 2-3 weeks is recommended.    The patient is at increased risk of perioperative pulmonary complications secondary to JEFFREY, advanced age greater than 60. Patient educated to bring CPAP/BIPAP day of surgery. Recommend prioritizing non opioid analgesic techniques (regional and local anesthesia, nonsteroidal medications, etc) before the administration of  opioids and close monitoring for hypoventilation after surgery due to JEFFREY/supsected JEFFREY. If intravenous narcotics are needed beyond the immediate gauri-operative period, the patient may benefit from continuous pulse oximetry to monitor for hypoxic events till baseline Sp02 is normal on room air and  a respiratory therapy evaluation.    STOP BANG 6, which places patient at high risk for having JEFFREY.  ARISCAT 36, Intermediate, 13.3% risk of in-hospital postoperative pulmonary complications  PRODIGY 20, high risk of respiratory depression episode.     Patient given PI sheet for preoperative deep breathing exercises.  Encourage  incentive spirometry in the postoperative period as deemed necessary.    Endocrine  #Hyperaldosteronism, hypercholesteremia - being managed by PCP    Gastrointestinal  #Hepatitis B&C - s/p Harvoni ([id number]) with neg VL     #GERD, OTC medications as needed     Eat 10- 0,  self-perceived oropharyngeal dysphagia scale (0-40)     Genitourinary  #H/o malignant neoplasm of prostate, follows with urology Dr. Meyer and oncology   - underwent spaceoar hydrogel placement on 5/10/24, started RT on 7/25/24 and completed 8/23/24. 7/19/24 started relugolix, now completed.    #BPH, Managed on Flomax (continue)     Renal  #CKD, last BUN 16 and CRE 1.16 on 4/20/25    The patient has specific risk factors associated with increased risk of perioperative renal complications related to age greater than 55, male gender, hypertension, CKD. Preventative measures include preoperative hydration. Recommendations to avoid nephrotoxic drugs and carefully monitor fluid status to maintain euvolemia. Use dose adjusted medications as needed for the underlying level of renal function.    Hematology  #MGUS, follows with hematology and oncology  - 0.5g/dL M protein IgA Kappa  - Kappa FLC 2.39mg/dL, FLC ratio 2.25  - Spot UPEP showed 4.6% of total urine protein Kappa FLC  - Osseous survey negative   - Negative SLiM CRAB  criteria    Caprini score 8, high risk of perioperative VTE.     Patient instructed to ambulate as soon as possible postoperatively to decrease thromboembolic risk. Initiate mechanical DVT prophylaxis as soon as possible and initiate chemical prophylaxis when deemed safe from a bleeding standpoint post surgery.     Transfusion Evaluation  A type and screen was obtained given the likelihood for perioperative transfusion of blood or blood products.    Musculoskeletal  #Chronic bilateral low back pain, takes oral analgesics as needed     ID  MRSA screening obtained. Prescriptions and instructions given for Hibiclens and Peridex.    Diagnostic Results      CT sinus 4/29/25:  1. The right maxillary sinus is completely opacified with material  extending into the right middle meatus as noted above. Mucocele is  not excluded.  2. Mild mucosal thickening and partial opacifications of the  paranasal sinuses elsewhere most prominent in the anterior right  ethmoid region.    Recent Results (from the past 4 weeks)   Type And Screen Is this order related to pregnancy or an upcoming surgery? Yes; Where will this surgery/delivery be performed? [facility]; What is the date of the surgery? 6/17/2025; Has this patient ever had a transfusion? No; Has t...    Collection Time: 06/03/25  8:58 AM   Result Value Ref Range    ABO TYPE B     Rh TYPE POS     ANTIBODY SCREEN NEG    CBC    Collection Time: 06/03/25  8:58 AM   Result Value Ref Range    WBC 4.4 4.4 - 11.3 x10*3/uL    nRBC 0.0 0.0 - 0.0 /100 WBCs    RBC 4.86 4.50 - 5.90 x10*6/uL    Hemoglobin 13.2 (L) 13.5 - 17.5 g/dL    Hematocrit 40.9 (L) 41.0 - 52.0 %    MCV 84 80 - 100 fL    MCH 27.2 26.0 - 34.0 pg    MCHC 32.3 32.0 - 36.0 g/dL    RDW 14.4 11.5 - 14.5 %    Platelets 289 150 - 450 x10*3/uL   Staphylococcus aureus/MRSA colonization, Culture    Collection Time: 06/03/25  8:58 AM    Specimen: Nares/Axilla/Groin; Swab   Result Value Ref Range    Staph/MRSA Screen  "Culture No Staphylococcus aureus isolated    Coagulation Screen    Collection Time: 06/03/25  8:58 AM   Result Value Ref Range    Protime 13.0 (H) 9.8 - 12.4 seconds    INR 1.2 (H) 0.9 - 1.1    aPTT 28 26 - 36 seconds   Comprehensive Metabolic Panel    Collection Time: 06/03/25  8:58 AM   Result Value Ref Range    Glucose 127 (H) 74 - 99 mg/dL    Sodium 140 136 - 145 mmol/L    Potassium 3.7 3.5 - 5.3 mmol/L    Chloride 97 (L) 98 - 107 mmol/L    Bicarbonate 35 (H) 21 - 32 mmol/L    Anion Gap 12 10 - 20 mmol/L    Urea Nitrogen 16 6 - 23 mg/dL    Creatinine 0.99 0.50 - 1.30 mg/dL    eGFR 80 >60 mL/min/1.73m*2    Calcium 8.8 8.6 - 10.6 mg/dL    Albumin 4.0 3.4 - 5.0 g/dL    Alkaline Phosphatase 83 33 - 136 U/L    Total Protein 6.8 6.4 - 8.2 g/dL    AST 15 9 - 39 U/L    Bilirubin, Total 0.8 0.0 - 1.2 mg/dL    ALT 15 10 - 52 U/L      Labs collected today: CBC, CMP, Coag, T/s, MRSA and EKG  - Labs and diagnostic testing reviewed on 6/3/25    -Preoperative medication instructions were provided and reviewed with the patient.  Any additional testing or evaluation was explained to the patient.  NPO Instructions were discussed, and the patient's questions were answered prior to conclusion of this encounter. Patient verbalized understanding of preoperative instructions. After Visit Summary given.             [1]   Past Medical History:  Diagnosis Date    A-fib (Multi)     on Eliquis, pre surgery stop instructions and clearance in 2/9/24 \"letters\"    Acute hepatitis C     Genotype 1a, s/p Harvoni [id number]    Anxiety     ASCVD (arteriosclerotic cardiovascular disease)     Benign prostatic hyperplasia with lower urinary tract symptoms     Chronic pain disorder     back    CKD (chronic kidney disease)     CKD (chronic kidney disease)     Coronary artery disease     Elevated PSA 12/21/2023    5.97    GERD (gastroesophageal reflux disease)     Hepatitis B     + antibody    Hyperaldosteronism     Hypercholesteremia     Hypertension  "    Irregular heart beat     Mitral valve insufficiency     Monoclonal gammopathy     MGUS followed by Hem/Onc Dr. Ochoa    JEFFREY (obstructive sleep apnea)     Prostate cancer (Multi)     RT and ADT [id number]    Tobacco use disorder    [2]   Past Surgical History:  Procedure Laterality Date    CARDIAC ELECTROPHYSIOLOGY PROCEDURE N/A 5/22/2025    Procedure: Cardioversion;  Surgeon: Mitch Vazquez MD;  Location: [place] [address] Cardiac Cath Lab;  Service: Electrophysiology;  Laterality: N/A;  CPT: 20921    CARDIOVERSION  12/2022    and Ablation for A Fib    COLONOSCOPY  08/26/2019    Colonoscopy    HERNIA REPAIR  11/15/2021    Hernia repair    PROSTATE BIOPSY  06/11/2014   [3]   Family History  Family history unknown: Yes   [4] No Known Allergies

## 2025-06-03 ENCOUNTER — PRE-ADMISSION TESTING (OUTPATIENT)
Dept: PREADMISSION TESTING | Facility: HOSPITAL | Age: 75
End: 2025-06-03
Payer: MEDICARE

## 2025-06-03 VITALS
WEIGHT: 229.2 LBS | DIASTOLIC BLOOD PRESSURE: 85 MMHG | HEIGHT: 72 IN | SYSTOLIC BLOOD PRESSURE: 140 MMHG | BODY MASS INDEX: 31.04 KG/M2 | OXYGEN SATURATION: 96 % | HEART RATE: 58 BPM | TEMPERATURE: 97.2 F

## 2025-06-03 DIAGNOSIS — D14.0 INVERTED PAPILLOMA OF MAXILLARY SINUS: Primary | ICD-10-CM

## 2025-06-03 DIAGNOSIS — Z01.818 PREOPERATIVE TESTING: ICD-10-CM

## 2025-06-03 DIAGNOSIS — C31.0 SQUAMOUS CELL CARCINOMA OF MAXILLARY SINUS: ICD-10-CM

## 2025-06-03 DIAGNOSIS — D49.9 NEOPLASM: ICD-10-CM

## 2025-06-03 DIAGNOSIS — I48.91 ATRIAL FIBRILLATION, UNSPECIFIED TYPE (MULTI): ICD-10-CM

## 2025-06-03 DIAGNOSIS — I07.1 MILD TRICUSPID REGURGITATION: ICD-10-CM

## 2025-06-03 LAB
ABO GROUP (TYPE) IN BLOOD: NORMAL
ALBUMIN SERPL BCP-MCNC: 4 G/DL (ref 3.4–5)
ALP SERPL-CCNC: 83 U/L (ref 33–136)
ALT SERPL W P-5'-P-CCNC: 15 U/L (ref 10–52)
ANION GAP SERPL CALC-SCNC: 12 MMOL/L (ref 10–20)
ANTIBODY SCREEN: NORMAL
APTT PPP: 28 SECONDS (ref 26–36)
AST SERPL W P-5'-P-CCNC: 15 U/L (ref 9–39)
BILIRUB SERPL-MCNC: 0.8 MG/DL (ref 0–1.2)
BUN SERPL-MCNC: 16 MG/DL (ref 6–23)
CALCIUM SERPL-MCNC: 8.8 MG/DL (ref 8.6–10.6)
CHLORIDE SERPL-SCNC: 97 MMOL/L (ref 98–107)
CO2 SERPL-SCNC: 35 MMOL/L (ref 21–32)
CREAT SERPL-MCNC: 0.99 MG/DL (ref 0.5–1.3)
EGFRCR SERPLBLD CKD-EPI 2021: 80 ML/MIN/1.73M*2
ERYTHROCYTE [DISTWIDTH] IN BLOOD BY AUTOMATED COUNT: 14.4 % (ref 11.5–14.5)
GLUCOSE SERPL-MCNC: 127 MG/DL (ref 74–99)
HCT VFR BLD AUTO: 40.9 % (ref 41–52)
HGB BLD-MCNC: 13.2 G/DL (ref 13.5–17.5)
INR PPP: 1.2 (ref 0.9–1.1)
MCH RBC QN AUTO: 27.2 PG (ref 26–34)
MCHC RBC AUTO-ENTMCNC: 32.3 G/DL (ref 32–36)
MCV RBC AUTO: 84 FL (ref 80–100)
NRBC BLD-RTO: 0 /100 WBCS (ref 0–0)
PLATELET # BLD AUTO: 289 X10*3/UL (ref 150–450)
POTASSIUM SERPL-SCNC: 3.7 MMOL/L (ref 3.5–5.3)
PROT SERPL-MCNC: 6.8 G/DL (ref 6.4–8.2)
PROTHROMBIN TIME: 13 SECONDS (ref 9.8–12.4)
RBC # BLD AUTO: 4.86 X10*6/UL (ref 4.5–5.9)
RH FACTOR (ANTIGEN D): NORMAL
SODIUM SERPL-SCNC: 140 MMOL/L (ref 136–145)
WBC # BLD AUTO: 4.4 X10*3/UL (ref 4.4–11.3)

## 2025-06-03 PROCEDURE — 87081 CULTURE SCREEN ONLY: CPT

## 2025-06-03 PROCEDURE — 36415 COLL VENOUS BLD VENIPUNCTURE: CPT

## 2025-06-03 PROCEDURE — 85027 COMPLETE CBC AUTOMATED: CPT

## 2025-06-03 PROCEDURE — 99205 OFFICE O/P NEW HI 60 MIN: CPT

## 2025-06-03 PROCEDURE — 86901 BLOOD TYPING SEROLOGIC RH(D): CPT

## 2025-06-03 PROCEDURE — 85610 PROTHROMBIN TIME: CPT

## 2025-06-03 PROCEDURE — 80053 COMPREHEN METABOLIC PANEL: CPT

## 2025-06-03 RX ORDER — CHLORHEXIDINE GLUCONATE 40 MG/ML
SOLUTION TOPICAL
Qty: 473 ML | Refills: 0 | Status: SHIPPED | OUTPATIENT
Start: 2025-06-03

## 2025-06-03 RX ORDER — CHLORHEXIDINE GLUCONATE ORAL RINSE 1.2 MG/ML
SOLUTION DENTAL
Qty: 120 ML | Refills: 0 | Status: SHIPPED | OUTPATIENT
Start: 2025-06-03

## 2025-06-03 ASSESSMENT — DUKE ACTIVITY SCORE INDEX (DASI)
CAN YOU CLIMB A FLIGHT OF STAIRS OR WALK UP A HILL: NO
CAN YOU DO LIGHT WORK AROUND THE HOUSE LIKE DUSTING OR WASHING DISHES: YES
CAN YOU PARTICIPATE IN MODERATE RECREATIONAL ACTIVITIES LIKE GOLF, BOWLING, DANCING, DOUBLES TENNIS OR THROWING A BASEBALL OR FOOTBALL: YES
CAN YOU DO HEAVY WORK AROUND THE HOUSE LIKE SCRUBBING FLOORS OR LIFTING AND MOVING HEAVY FURNITURE: YES
CAN YOU RUN A SHORT DISTANCE: NO
CAN YOU WALK A BLOCK OR TWO ON LEVEL GROUND: YES
CAN YOU WALK INDOORS, SUCH AS AROUND YOUR HOUSE: YES
CAN YOU DO YARD WORK LIKE RAKING LEAVES, WEEDING OR PUSHING A MOWER: NO
CAN YOU TAKE CARE OF YOURSELF (EAT, DRESS, BATHE, OR USE TOILET): YES
CAN YOU DO MODERATE WORK AROUND THE HOUSE LIKE VACUUMING, SWEEPING FLOORS OR CARRYING GROCERIES: YES
CAN YOU PARTICIPATE IN STRENOUS SPORTS LIKE SWIMMING, SINGLES TENNIS, FOOTBALL, BASKETBALL, OR SKIING: NO
CAN YOU HAVE SEXUAL RELATIONS: YES
TOTAL_SCORE: 32.7
DASI METS SCORE: 6.8

## 2025-06-03 ASSESSMENT — ENCOUNTER SYMPTOMS
CONSTITUTIONAL NEGATIVE: 1
MUSCULOSKELETAL NEGATIVE: 1
ENDOCRINE NEGATIVE: 1
EYES NEGATIVE: 1
NEUROLOGICAL NEGATIVE: 1
RESPIRATORY NEGATIVE: 1
CARDIOVASCULAR NEGATIVE: 1
GASTROINTESTINAL NEGATIVE: 1
NECK NEGATIVE: 1

## 2025-06-03 ASSESSMENT — LIFESTYLE VARIABLES: SMOKING_STATUS: NONSMOKER

## 2025-06-03 NOTE — NURSING NOTE
Patient seen in PAT. Orders reviewed with PAT Provider.     Following labs obtained by documenting RN: Coags, CBC, MRSA, CMP    EKG: Completed today    Patient discharged with: Pre-procedure instructions/AVS, no further questions        Irene FRANKLINN, RN  [facility] Pre-Admission [facility]   [facility]

## 2025-06-03 NOTE — PREPROCEDURE INSTRUCTIONS
Thank you for visiting [facility] ([facility]) today for your pre-procedure evaluation, you were seen by     JAYSHREE Ortiz  Department of Anesthesiology and Perioperative Medicine  Main phone [phone]  Fax [phone]    This summary includes instructions and information to aid you during your perioperative period.  Please read carefully. If you have any questions about your visit today, please call the number listed above.  If you become ill or have any changes to your health before your surgery, please contact your primary care provider and alert your surgeon.    General Medications Instructions (see back for further medication instructions)  Hold Vit D supplementation day of surgery  Hold all other vitamins and supplements 7 days prior to surgery  Tylenol okay to continue, please hold Aleve/naproxen/ibuprofen (NSAIDs) for 7 days prior to surgery  No lotion/moisturizers or Deoderant after last shower prior to surgery    You will be called business day prior to surgery to confirm arrival time.     Preparing for Surgery       Preoperative Fasting Guidelines  Why must I stop eating and drinking near surgery time?  With sedation, food or liquid in your stomach can enter your lungs causing serious complications  Food can increase nausea and vomiting  When do I need to stop eating and drinking before my surgery?  Do not eat any food after midnight the night before your surgery/procedure.  You may have up to 13.5 ounces of clear liquid until TWO hours before your instructed arrival time to the hospital.  This includes water, black tea/coffee, (no milk or cream) apple juice, and electrolyte drinks (Gatorade)  You may chew gum until TWO hours before your surgery/procedure    Tobacco and Alcohol;  Do not drink alcohol or smoke within 24 hours of surgery.  It is best to quit smoking for as long as possible before any surgery or procedure.       Other Instructions  Why did I have my  "nose, under my arms, and groin swabbed? The purpose of the swab is to identify Staphylococcus aureus inside your nose or on your skin.  The swab was sent to the laboratory for culture.  A positive swab/culture for Staphylococcus aureus is called colonization or carriage.   What is Staphylococcus aureus? Staphylococcus aureus, also known as \"staph\", is a germ found on the skin or in the nose of healthy people.  Sometimes Staphylococcus aureus can get into the body and cause an infection.  This can be minor (such as pimples, boils, or other skin problems).  It might also be serious (such as a blood infection, pneumonia, or a surgical site infection). What is Staphylococcus aureus colonization or carriage? Colonization or carriage means that a person has the germ but is not sick from it.  These bacteria can be spread on the hands or when breathing or sneezing. How is Staphylococcus aureus spread? It is most often spread by close contact with a person or item that carries it. What happens if my culture is positive for Staphylococcus aureus? Your doctor/medical team will use this information to guide any antibiotic treatment which may be necessary.  Regardless of the culture results, we will clean the inside of your nose with a betadine swab just before you have your surgery. Will I get an infection if I have Staphylococcus aureus in my nose or on my skin? Anyone can get an infection with Staphylococcus aureus.  However, the best way to reduce your risk of infection is to follow the instructions provided to you for the use of your CHG soap and dental rinse.  , Body Wash; What is a home preoperative antibacterial shower? This shower is a way of cleaning the skin with a germ-killing solution before surgery.  The solution contains chlorhexidine, commonly known as CHG.  CHG is a skin cleanser with germ-killing ability.  Let your doctor know if you are allergic to chlorhexidine. Why do I need to take a preoperative " antibacterial shower? Skin is not sterile.  It is best to try to make your skin as free of germs as possible before surgery.  Proper cleansing with a germ-killing soap before surgery can lower the number of germs on your skin.  This helps to reduce the risk of infection at the surgical site.  Following the instructions listed below will help you prepare your skin for surgery.   How do I use the solution? Steps:  Begin using your CHG soap 5 days before your scheduled surgery on ___________.   First, wash and rinse your hair using the CHG soap. Keep CHG soap away from ear canals and eyes.  Rinse completely, do not condition.  Hair extensions should be removed. , Oral/Dental Rinse: What is oral/dental rinse?  It is a mouthwash. It is a way of cleaning the mouth with a germ-killing solution before your surgery.  The solution contains chlorhexidine, commonly known as CHG. It is used inside the mouth to kill a bacteria known as Staphylococcus aureus.  Let your doctor know if you are allergic to Chlorhexidine. Why do I need to use CHG oral/dental rinse? The CHG oral/dental rinse helps to kill a bacteria in your mouth known as Staphylococcus aureus.  This reduces the risk of infection at the surgical site.  Using your CHG oral/dental rinse STEPS: Use your CHG oral/dental rinse after you brush your teeth the night before (at bedtime) and the morning of your surgery.  Follow all directions on your prescription label.  Use the cap on the container to measure 15 ml.  Swish (gargle if you can) the mouthwash in your mouth for at least 30 seconds, (do not swallow) and spit out.  After you use your CHG rinse, do not rinse your mouth with water, drink or eat.  Please refer to the prescription label for the appropriate time to resume oral intake What side effects might I have using the CHG oral/dental rinse? CHG rinse will stick to plaque on the teeth.  Brush and floss just before use.  Teeth brushing will help avoid staining of  plaque during use.       The Week before Surgery        Seven days before Surgery  Check your [facility] medication instructions  Do the exercises provided to you by [facility]   Arrange for a responsible, adult licensed  to take you home after surgery and stay with you for 24 hours.  You will not be permitted to drive yourself home if you have received any anesthetic/sedation  Five days before surgery  Check your [facility] medication instructions  Do the exercises provided to you by [facility]   Start using Chlorhexidene (CHG) body wash if prescribed (Continue till day of surgery)      The Day before Surgery       Check your [facility] medication and all other [facility] instructions including when to stop eating and drinking  You will be called with your arrival time for surgery in the late afternoon.  If you do not receive a call please reach out to your surgeon's office.  Do not smoke or drink 24 hours before surgery  Prepare items to bring with you to the hospital  Shower with your chlorhexidine wash if prescribed  Brush your teeth and use your chlorhexidine dental rinse if prescribed    The Day of Surgery       Check your [facility] medication instructions  Shower, if prescribed use CHG.  Do not apply any lotions, creams, moisturizers, perfume or deodorant  Brush your teeth and use your CHG dental rinse if prescribed  Wear loose comfortable clothing  Avoid make-up  Remove  jewelry and piercings, consider professional piercing removal with a plastic spacer if needed  Bring photo ID and Insurance card  Bring an accurate medication list that includes medication dose, frequency and allergies  Bring a copy of your advanced directives (will, health care power of )  Bring any devices and controllers as well as medical devices you have been provided with for surgery (CPAP, slings, braces, etc.)  Dentures, eyeglasses, and contacts will be removed before surgery, please bring cases for contacts or glasses    Preoperative Deep Breathing Exercises    Why  it is important to do deep breathing exercises before my surgery?  Deep breathing exercises strengthen your breathing muscles.  This helps you to recover after your surgery and decreases the chance of breathing complications.    How are the deep breathing exercises done?  Sit straight with your back supported.  Breathe in deeply and slowly through your nose. Your lower rib cage should expand and your abdomen may move forward.  Hold that breath for 3 to 5 seconds.  Breathe out through pursed lips, slowly and completely.  Rest and repeat 10 times every hour while awake.  Rest longer if you become dizzy or lightheaded.      Preoperative Brain Exercises    What are brain exercises?  A brain exercise is any activity that engages your thinking (cognitive) skills.    What types of activities are considered brain exercises?  Jigsaw puzzles, crossword puzzles, word jumble, memory games, word search, and many more.  Many can be found free online or on your phone via a mobile [place].    Why should I do brain exercises before my surgery?  More recent research has shown brain exercise before surgery can lower the risk of postoperative delirium (confusion) which can be especially important for older adults.  Patients who did brain exercises for 5 to 10 hours the days before surgery, cut their risk of postoperative delirium in half up to 1 week after surgery.    Sit-to-Stand Exercise    What is the sit-to-stand exercise?  The sit-to-stand exercise strengthens the muscles of your lower body and muscles in the center of your body (core muscles for stability) helping to maintain and improve your strength and mobility.  How do I do the sit-to-stand exercise?  The goal is to do this exercise without using your arms or hands.  If this is too difficult, use your arms and hands or a chair with armrests to help slowly push yourself to the standing position and lower yourself back to the sitting position. As the movement becomes easier use  your arms and hands less.    Steps to the sit-to-stand exercise  Sit up tall in a sturdy chair, knees bent, feet flat on the floor shoulder-width apart.  Shift your hips/pelvis forward in the chair to correctly position yourself for the next movement.  Lean forward at your hips.  Stand up straight putting equal weight on both feet.  Check to be sure you are properly aligned with the chair, in a slow controlled movement sit back down.  Repeat this exercise 10-15 times.  If needed you can do it fewer times until your strength improves.  Rest for 1 minute.  Do another 10-15 sit-to-stand exercises.  Try to do this in the morning and evening.       Simple things you can do to help prevent blood clots     Blood clots are blockages that can form in the body's veins. When a blood clot forms in your deep veins, it may be called a deep vein thrombosis, or DVT for short. Blood clots can happen in any part of the body where blood flows, but they are most common in the arms and legs. If a piece of a blood clot breaks free and travels to the lungs, it is called a pulmonary embolus (PE). A PE can be a very serious problem.      Being in the hospital or having surgery can raise your chances of getting a blood clot because you may not be well enough to move around as much as you normally do.         Ways you can help prevent blood clots in the hospital       Wearing SCDs  SCDs stands for Sequential Compression Devices.   SCDs are special sleeves that wrap around your legs. They attach to a pump that fills them with air to gently squeeze your legs every few minutes.  This helps return the blood in your legs to your heart.   SCDs should only be taken off when walking or bathing. SCDs may not be comfortable, but they can help save your life.              Pump SCD leg sleeves  Wearing compression stockings - if your doctor orders them. These special snug-fitting stockings gently squeeze your legs to help blood flow.       Walking.  Walking helps move the blood in your legs.   If your doctor says it is ok, try walking the halls at least   5 times a day. Ask us to help you get up, so you don't fall.      Taking any blood-thinning medicines your doctor orders.              Ways you can help prevent blood clots at home         Wearing compression stockings - if your doctor orders them.   Walking - to help move the blood in your legs.    Taking any blood-thinning medicines your doctor orders.      Signs of a blood clot or PE    Tell your doctor or nurse right away if you have any of the problems listed below.         If you are at home, seek medical care right away. Call 911 for chest pain or problems breathing.            Signs of a blood clot (DVT) - such as pain, swelling, redness, or warmth in your arm or legs.  Signs of a pulmonary embolism (PE) - such as chest pain or feeling short of breath

## 2025-06-04 LAB — STAPHYLOCOCCUS SPEC CULT: NORMAL

## 2025-06-04 NOTE — PROGRESS NOTES
Cardiac Electrophysiology Office Visit   I had the pleasure seeing Christiano Masters    Current Outpatient Medications   Medication Instructions    albuterol 90 mcg/actuation inhaler 2 puffs, inhalation, Every 6 hours PRN    amLODIPine (NORVASC) 10 mg, oral, Daily    apixaban (ELIQUIS) 5 mg, oral, 2 times daily    benzonatate (TESSALON) 200 mg, oral, 3 times daily PRN, Do not crush or chew.    chlorhexidine (Hibiclens) 4 % external liquid Use as directed daily perioperatively    chlorhexidine (Peridex) 0.12 % solution Swish and spit with 15ml of solution the night before and morning of surgery. Do not swallow.    fluticasone (Flonase) 50 mcg/actuation nasal spray 2 sprays, Each Nostril, Daily    losartan-hydrochlorothiazide (Hyzaar) 100-12.5 mg tablet 1 tablet, oral, Daily    metoprolol tartrate (LOPRESSOR) 25 mg, oral, 2 times daily    multivitamin tablet 1 tablet, Daily    pravastatin (PRAVACHOL) 40 mg, oral, Nightly    tamsulosin (FLOMAX) 0.8 mg, oral, Daily      Subjective    Christiano Masters is a 74 y.o. male .        Chief Complaint   Patient presents with    Atrial Fibrillation     OUTPATIENT CONSULTATION: Cardiac Electrophysiology  DOS: June 05, 2025   REASON: AF - FUV s/p Cardioversion     REFERRING:     [facility]     Christiano Masters has a past medical history of Hepatitis B&C - s/p Harvoni ([id number]) with neg VL, Anxiety, BPH, Chronic pain disorder, CKD, Cluster headache, GERD, Hyperaldosteronism, Hypercholesteremia, HTN, JEFFREY, Prostate cancer - s/p RT and ADT, Organ-limited Amyloidosis   CARDIAC HISTORY:  CAD   Persistent AF - Found on EKG at PCP office (May 19, 2022). He was seen for diarrhea and discoloration in his urine. This was after a trip in [place]. Started on Metoprolol for rate control. No OAT.   AF treatment hx:  (index dx 5/19/22) started on Metoprolol.  S/p DCCV (June 2022).  Back in AF (Nov 2022).   Successful PVI by me (12/19/22) without complications. Noted to be in AF during 6week f/u.   Underwent successful DCCV (2/27/23).  May 2022: We initiated OAT and will proceed with cardioversion. Should he feel better in NSR and reverts into atrial fibrillation our next step would be to proceed with either an antiarrhythmic or PVI.   June, 2022: successful DCCV done by me  Nov, 2022: He felt much better after the cardioversion. But recently started noticing lightheadedness especially when standing up. He also has noted his blood pressure is becoming lower. He is back in atrial fibrillation.  Proceed with PVI.    Dec, 2022:  Underwent PVI by me without complications. Noted to be in AF during 6 wk follow up.  Underwent successful DCCV (2/27/23).  March 2024: Eliquis stopped per pt request.  April 2025: He presented to the ED for dizziness and feeling like he was in AF. He stated he had missed multiple doses of his Metoprolol. His EKG confirmed he was in AF.   May 2025: He is now s/p DCCV by me 05/22/2025.    RELEVANT TESTING:     No results found for this or any previous visit from the past 1095 days.       Lab Review:   Lab Results   Component Value Date    WBC 4.4 06/03/2025    HGB 13.2 (L) 06/03/2025    HCT 40.9 (L) 06/03/2025     06/03/2025    CHOL 128 07/02/2024    TRIG 56 07/02/2024    HDL 42.3 07/02/2024    ALT 15 06/03/2025    AST 15 06/03/2025     06/03/2025    K 3.7 06/03/2025    CL 97 (L) 06/03/2025    CREATININE 0.99 06/03/2025    BUN 16 06/03/2025    CO2 35 (H) 06/03/2025    TSH 1.01 04/20/2025    PSA 0.23 04/18/2025    INR 1.2 (H) 06/03/2025    HGBA1C 5.8 (H) 07/02/2024       ROS     Objective    Physical Exam       Assessment/Plan

## 2025-06-05 ENCOUNTER — OFFICE VISIT (OUTPATIENT)
Dept: CARDIOLOGY | Facility: CLINIC | Age: 75
End: 2025-06-05
Payer: MEDICARE

## 2025-06-05 DIAGNOSIS — I48.91 ATRIAL FIBRILLATION, UNSPECIFIED TYPE (MULTI): Primary | ICD-10-CM

## 2025-06-16 ENCOUNTER — APPOINTMENT (OUTPATIENT)
Dept: CARDIOLOGY | Facility: CLINIC | Age: 75
End: 2025-06-16
Payer: MEDICARE

## 2025-06-17 NOTE — PROGRESS NOTES
Cardiac Electrophysiology Office Visit   I had the pleasure seeing Christiano Masters    Current Outpatient Medications   Medication Instructions    albuterol 90 mcg/actuation inhaler 2 puffs, inhalation, Every 6 hours PRN    amLODIPine (NORVASC) 10 mg, oral, Daily    apixaban (ELIQUIS) 5 mg, oral, 2 times daily    benzonatate (TESSALON) 200 mg, oral, 3 times daily PRN, Do not crush or chew.    chlorhexidine (Hibiclens) 4 % external liquid Use as directed daily perioperatively    chlorhexidine (Peridex) 0.12 % solution Swish and spit with 15ml of solution the night before and morning of surgery. Do not swallow.    fluticasone (Flonase) 50 mcg/actuation nasal spray 2 sprays, Each Nostril, Daily    losartan-hydrochlorothiazide (Hyzaar) 100-12.5 mg tablet 1 tablet, oral, Daily    metoprolol tartrate (LOPRESSOR) 25 mg, oral, 2 times daily    multivitamin tablet 1 tablet, Daily    pravastatin (PRAVACHOL) 40 mg, oral, Nightly    tamsulosin (FLOMAX) 0.8 mg, oral, Daily      Subjective    Christiano Masters is a 74 y.o. male .        Chief Complaint   Patient presents with    Atrial Fibrillation     OUTPATIENT CONSULTATION: Cardiac Electrophysiology  DOS: June 19, 2025   REASON: AF - CVN FUV     HPI     Christiano Masters has a past medical history of Hepatitis B&C - s/p Harvoni ([id number]) with neg VL, Anxiety, BPH, Chronic pain disorder, CKD, Cluster headache, GERD, Hyperaldosteronism, Hypercholesteremia, HTN, JEFFREY, Prostate cancer - s/p RT and ADT, Organ-limited Amyloidosis   CARDIAC HISTORY:  CAD   Persistent AF - Found on EKG at PCP office (May 19, 2022). He was seen for diarrhea and discoloration in his urine. This was after a trip in [place]. Started on Metoprolol for rate control. No OAT.   AF treatment hx:  (index dx 5/19/22) started on Metoprolol.  S/p DCCV (June 2022).  Back in AF (Nov 2022).   Successful PVI by me (12/19/22) without complications. Noted to be in AF during 6week f/u.  Underwent successful DCCV  (2/27/23).  May 2022: We initiated OAT and will proceed with cardioversion. Should he feel better in NSR and reverts into atrial fibrillation our next step would be to proceed with either an antiarrhythmic or PVI.   June, 2022: successful DCCV done by me  Nov, 2022: He felt much better after the cardioversion. But recently started noticing lightheadedness especially when standing up. He also has noted his blood pressure is becoming lower. He is back in atrial fibrillation.  Proceed with PVI.    Dec, 2022:  Underwent PVI by me without complications. Noted to be in AF during 6 wk follow up.  Underwent successful DCCV (2/27/23).  March 2024: Eliquis stopped per pt request.  April 2025: He presented to the ED for dizziness and feeling like he was in AF. He stated he had missed multiple doses of his Metoprolol. His EKG confirmed he was in AF.  May 2025: Successful DCCV done by me 05/22/2025    [id number] Score  Age 65-74: 1   Sex Male: 0   CHF History No: 0   HTN Yes: 1   Stroke/TIA/Thromboembolism No: 0   Vascular Dz: CAD/PAD/Aortic Plaque Yes: 1   DM No: 0   Total Score 3     RELEVANT TESTING:     No results found for this or any previous visit from the past 1095 days.       Lab Review:   Lab Results   Component Value Date    WBC 4.4 06/03/2025    HGB 13.2 (L) 06/03/2025    HCT 40.9 (L) 06/03/2025     06/03/2025    CHOL 128 07/02/2024    TRIG 56 07/02/2024    HDL 42.3 07/02/2024    ALT 15 06/03/2025    AST 15 06/03/2025     06/03/2025    K 3.7 06/03/2025    CL 97 (L) 06/03/2025    CREATININE 0.99 06/03/2025    BUN 16 06/03/2025    CO2 35 (H) 06/03/2025    TSH 1.01 04/20/2025    PSA 0.23 04/18/2025    INR 1.2 (H) 06/03/2025    HGBA1C 5.8 (H) 07/02/2024       Review of Systems   All other systems reviewed and are negative.       Objective    Cardiovascular:      PMI at left midclavicular line. Normal rate. Regular rhythm. Normal S1. Normal S2.       Murmurs: There is no murmur.      No gallop.  No click.  No rub.   Pulses:     Intact distal pulses.   Edema:     Peripheral edema absent.            Assessment/Plan     AF- s/p CVN 05/22/2025.  He needs Endoscopic resection of sinonasl mass on  secondary to Inverted papilloma of maxillary sinus, squamous cell carcinoma of maxillary sinus . He can proceed with the surgery as it has been 4 weeks now after the cardioversion. He is in NSR. Eliquis can be stopped as he has normal renal function 48 hours prior to the surgery to be restarted as soon as no contraindications after the surgery. We will follow up in 3 months with him

## 2025-06-19 ENCOUNTER — OFFICE VISIT (OUTPATIENT)
Dept: CARDIOLOGY | Facility: CLINIC | Age: 75
End: 2025-06-19
Payer: MEDICARE

## 2025-06-19 VITALS
BODY MASS INDEX: 31.44 KG/M2 | SYSTOLIC BLOOD PRESSURE: 155 MMHG | WEIGHT: 232.1 LBS | HEART RATE: 58 BPM | DIASTOLIC BLOOD PRESSURE: 83 MMHG | OXYGEN SATURATION: 97 % | HEIGHT: 72 IN

## 2025-06-19 DIAGNOSIS — I48.91 ATRIAL FIBRILLATION, UNSPECIFIED TYPE (MULTI): Primary | ICD-10-CM

## 2025-06-19 LAB
ATRIAL RATE: 51 BPM
P AXIS: 46 DEGREES
P OFFSET: 185 MS
P ONSET: 126 MS
PR INTERVAL: 188 MS
Q ONSET: 220 MS
QRS COUNT: 8 BEATS
QRS DURATION: 108 MS
QT INTERVAL: 420 MS
QTC CALCULATION(BAZETT): 387 MS
QTC FREDERICIA: 398 MS
R AXIS: -21 DEGREES
T AXIS: 18 DEGREES
T OFFSET: 430 MS
VENTRICULAR RATE: 51 BPM

## 2025-06-19 PROCEDURE — 3079F DIAST BP 80-89 MM HG: CPT | Performed by: INTERNAL MEDICINE

## 2025-06-19 PROCEDURE — 93005 ELECTROCARDIOGRAM TRACING: CPT | Performed by: INTERNAL MEDICINE

## 2025-06-19 PROCEDURE — 99214 OFFICE O/P EST MOD 30 MIN: CPT | Performed by: INTERNAL MEDICINE

## 2025-06-19 PROCEDURE — 3008F BODY MASS INDEX DOCD: CPT | Performed by: INTERNAL MEDICINE

## 2025-06-19 PROCEDURE — 3077F SYST BP >= 140 MM HG: CPT | Performed by: INTERNAL MEDICINE

## 2025-06-19 PROCEDURE — 1126F AMNT PAIN NOTED NONE PRSNT: CPT | Performed by: INTERNAL MEDICINE

## 2025-06-19 PROCEDURE — 93010 ELECTROCARDIOGRAM REPORT: CPT | Performed by: INTERNAL MEDICINE

## 2025-06-19 PROCEDURE — 99212 OFFICE O/P EST SF 10 MIN: CPT | Mod: 25

## 2025-06-19 PROCEDURE — 1159F MED LIST DOCD IN RCRD: CPT | Performed by: INTERNAL MEDICINE

## 2025-06-19 PROCEDURE — 1160F RVW MEDS BY RX/DR IN RCRD: CPT | Performed by: INTERNAL MEDICINE

## 2025-06-19 PROCEDURE — 1036F TOBACCO NON-USER: CPT | Performed by: INTERNAL MEDICINE

## 2025-06-19 ASSESSMENT — ENCOUNTER SYMPTOMS
LOSS OF SENSATION IN FEET: 0
OCCASIONAL FEELINGS OF UNSTEADINESS: 0
DEPRESSION: 0

## 2025-06-19 ASSESSMENT — PAIN SCALES - GENERAL: PAINLEVEL_OUTOF10: 0-NO PAIN

## 2025-06-20 ENCOUNTER — OFFICE VISIT (OUTPATIENT)
Dept: HEMATOLOGY/ONCOLOGY | Facility: HOSPITAL | Age: 75
End: 2025-06-20
Payer: MEDICARE

## 2025-06-20 VITALS
TEMPERATURE: 97.5 F | OXYGEN SATURATION: 98 % | DIASTOLIC BLOOD PRESSURE: 83 MMHG | HEART RATE: 59 BPM | RESPIRATION RATE: 20 BRPM | SYSTOLIC BLOOD PRESSURE: 148 MMHG

## 2025-06-20 DIAGNOSIS — C61 PROSTATE CANCER (MULTI): ICD-10-CM

## 2025-06-20 PROCEDURE — 1126F AMNT PAIN NOTED NONE PRSNT: CPT | Performed by: NURSE PRACTITIONER

## 2025-06-20 PROCEDURE — 99214 OFFICE O/P EST MOD 30 MIN: CPT | Performed by: NURSE PRACTITIONER

## 2025-06-20 PROCEDURE — 1160F RVW MEDS BY RX/DR IN RCRD: CPT | Performed by: NURSE PRACTITIONER

## 2025-06-20 PROCEDURE — 1036F TOBACCO NON-USER: CPT | Performed by: NURSE PRACTITIONER

## 2025-06-20 PROCEDURE — 1159F MED LIST DOCD IN RCRD: CPT | Performed by: NURSE PRACTITIONER

## 2025-06-20 PROCEDURE — 3079F DIAST BP 80-89 MM HG: CPT | Performed by: NURSE PRACTITIONER

## 2025-06-20 PROCEDURE — 3077F SYST BP >= 140 MM HG: CPT | Performed by: NURSE PRACTITIONER

## 2025-06-20 ASSESSMENT — PAIN SCALES - GENERAL: PAINLEVEL_OUTOF10: 0-NO PAIN

## 2025-06-20 NOTE — PROGRESS NOTES
"Patient ID: Christiano Masters is a 74 y.o. male.    73 yo AA male with BCR after local definitive RT for HR disease (iPSA 11, [place] 4+4, cN0M0 by conventional scans) diagnosed in 06/2014, treated with 18m ADT (planned for 24m, stopped due to weight gain and hypertension) and definitive radiation to the prostate (79.2Gy in 44 fractions, completed 1/13/2015).   Lost to follow up, lowest PSA was 0.26, with BCR in 02/2022 (PSA 3), PSA most recently 5.97 in 12/23 with [facility] PET/CT suggesting local prostate recurrence (left anterior TZ, SUV 5.5).  PSADT currently ~10 months. He has chronic lower back pain.   MRI and TRUS/Bx done and path now c/w GS8 disease- both side of the gland  Discussed salvage options and he is interested in radiation- has many questions about AEs and despite conversations he has had with Rad Onc it does not sound he has captured the entire data  4/18/24 patient met with Dr. Hassan and recommendation: Salvage RT and ADT for 6 months was favored.   7/19/24 started relugolix, completed 6 mos of therapy in Jan 2025.   RT started 7/25/24 and completed 8/23/24.     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    Subjective    HPI  Seen in follow up for his prostate cancer. He is being monitored. Completed 6 mos of relugolix in January.   Hot flashes resolved recently.   Nocturia every 2 hrs. Not new. Denies dysuria/hematuria.  Saw Urology, Dr. Meyer, in May. Options discussed.   Appetite is \"enormous\".   Denies cough. +stuffy nose in the morning.   Denies n/v.   Bowels fine.   Sometimes hips hurt. This is not new or unusual. He is unsure how often it bothers him. He declines xray.     Objective    BSA: There is no height or weight on file to calculate BSA.  /83 (BP Location: Right arm, Patient Position: Sitting, BP Cuff Size: Adult)   Pulse 59   Temp 36.4 °C (97.5 °F) (Temporal)   Resp 20   SpO2 98%    "   Physical Exam  Vitals reviewed.   Constitutional:       General: He is not in acute distress.  Eyes:      General: No scleral icterus.  Cardiovascular:      Rate and Rhythm: Normal rate and regular rhythm.   Pulmonary:      Effort: Pulmonary effort is normal.      Breath sounds: Normal breath sounds.   Abdominal:      General: Bowel sounds are normal. There is no distension.      Palpations: Abdomen is soft.      Tenderness: There is no abdominal tenderness. There is no guarding.   Musculoskeletal:      Comments: Wearing compression socks  Swelling noted at ankles/feet  Ambulates independently    Skin:     General: Skin is warm and dry.   Neurological:      Mental Status: He is alert and oriented to person, place, and time.   Psychiatric:         Mood and Affect: Mood normal.         Behavior: Behavior normal.         Performance Status:  Symptomatic; fully ambulatory    Lab Results   Component Value Date    PSA 0.23 04/18/2025    PSA <0.10 11/22/2024    PSA 0.17 08/28/2024     Lab Results   Component Value Date    WBC 4.4 06/03/2025    HGB 13.2 (L) 06/03/2025    HCT 40.9 (L) 06/03/2025    MCV 84 06/03/2025     06/03/2025     Lab Results   Component Value Date    GLUCOSE 127 (H) 06/03/2025    CALCIUM 8.8 06/03/2025     06/03/2025    K 3.7 06/03/2025    CO2 35 (H) 06/03/2025    CL 97 (L) 06/03/2025    BUN 16 06/03/2025    CREATININE 0.99 06/03/2025     Lab Results   Component Value Date    TESTOSTERONE 422 04/18/2025     Lab Results   Component Value Date    ALT 15 06/03/2025    AST 15 06/03/2025     (H) 05/19/2022    ALKPHOS 83 06/03/2025    BILITOT 0.8 06/03/2025        Assessment/Plan   High-risk PCa being monitored after salvage RT and ADT after RT.   Patient will get PSA today. T no longer castrate per labs in April.   Plan follow up in 3 mos with labs.   Patient declines imaging of hips. Pain is not new and not worsening.     Sinonasal papilloma with high grade dysplasia and focal invasive  squamous cell carcinioma- surgery being planned (Dr. Francisco)    Continue follow up with PCP. Next appt is 7/14.     Continues to follow up with Urology, Dr. Meyer, next appt 9/16.     Cancer Staging   Malignant neoplasm of prostate (Multi)  Staging form: Prostate, AJCC 8th Edition  - Clinical stage from 6/11/2014: Stage IIC (cT1c, cN0, cM0, PSA: 11, Grade Group: 4) - Signed by Trisha Narvaez MD PhD on 2/6/2024      Oncology History   Malignant neoplasm of prostate (Multi)   6/11/2014 Cancer Staged    Staging form: Prostate, AJCC 8th Edition, Clinical stage from 6/11/2014: Stage IIC (cT1c, cN0, cM0, PSA: 11, Grade Group: 4) - Signed by Trisha Narvaez MD PhD on 2/6/2024 2/6/2024 Initial Diagnosis    Malignant neoplasm of prostate (CMS/HCC)          Diagnoses and all orders for this visit:  Prostate cancer (Multi)  -     Clinic Appointment Request Follow Up; [facility]  -     Prostate Specific Antigen; Future  -     Clinic Appointment Request [facility] (or Mo); Future  -     CBC and Auto Differential; Future  -     Comprehensive Metabolic Panel; Future  -     Prostate Specific Antigen; Future  -     Testosterone; Future           MARCIE Lopez-CNP

## 2025-06-25 ENCOUNTER — APPOINTMENT (OUTPATIENT)
Dept: OTOLARYNGOLOGY | Facility: CLINIC | Age: 75
End: 2025-06-25
Payer: MEDICARE

## 2025-07-07 PROBLEM — E78.5 HYPERLIPIDEMIA: Status: ACTIVE | Noted: 2025-07-07

## 2025-07-07 PROBLEM — C61 PROSTATE CANCER (MULTI): Status: ACTIVE | Noted: 2025-07-07

## 2025-07-07 NOTE — PROGRESS NOTES
Subjective   Patient ID:   Christiano Masters is a 74 y.o. male who presents for No chief complaint on file..  HPI  Prostate cancer:  Had treatment about 10 years ago.  Following with oncology and urology.    Sinonasal mass:  Has upcoming procedure on 7/18/25 to remove this.    A-fib/HTN:  Following with cardiology.  Taking Amlodipine, Losartan-hydrochlorothiazide, Metoprolol.  BP today is     Hep C:  Had treatment in the past.    HLD:  Taking Pravastatin.  Last checked July 2024.  DUE for re-check.    Hyperglycemia:  Glucose was 116 in July 2025.  DUE for A1C    Health maintenance:  Smoking: Former smoker.  PSA (50+): Nov 2024  Labs: Nov 2024. DUE for lipid, A1C  Colonoscopy (50-75): 2023  Prevnar 13 (65+): 2015  Pneumovax 23 (65+, 1 year after Prev 13): 2019  Influenza:  Shingrix (2 doses, 2-6 months apart):    Review of Systems  12 point review of systems negative unless stated above in HPI    There were no vitals filed for this visit.    Physical Exam  General: Alert and oriented, well nourished, no acute distress.  Lungs: Clear to auscultation, non-labored respiration.  Heart: Normal rate, regular rhythm, no murmur, gallop or edema.  Neurologic: Awake, alert, and oriented X3, CN II-XII intact.  Psychiatric: Cooperative, appropriate mood and affect.    Assessment/Plan     Diagnoses and all orders for this visit:  Medicare annual wellness visit, subsequent  Depression screening  Atrial fibrillation, unspecified type (Multi)  Hypertension, unspecified type  Prostate cancer (Multi)  Hyperlipidemia, unspecified hyperlipidemia type  Chronic hepatitis C without hepatic coma (Multi)  Adenocarcinoma of prostate (Multi)  Nasal cavity mass  Hyperglycemia

## 2025-07-09 ENCOUNTER — APPOINTMENT (OUTPATIENT)
Dept: OTOLARYNGOLOGY | Facility: CLINIC | Age: 75
End: 2025-07-09
Payer: MEDICARE

## 2025-07-10 ENCOUNTER — HOSPITAL ENCOUNTER (OUTPATIENT)
Dept: RADIOLOGY | Facility: HOSPITAL | Age: 75
Discharge: HOME | End: 2025-07-10
Payer: MEDICARE

## 2025-07-10 ENCOUNTER — ANESTHESIA EVENT (OUTPATIENT)
Dept: OPERATING ROOM | Facility: HOSPITAL | Age: 75
End: 2025-07-10
Payer: MEDICARE

## 2025-07-10 ENCOUNTER — PRE-ADMISSION TESTING (OUTPATIENT)
Dept: PREADMISSION TESTING | Facility: HOSPITAL | Age: 75
End: 2025-07-10
Payer: MEDICARE

## 2025-07-10 VITALS
HEIGHT: 72 IN | TEMPERATURE: 98.2 F | HEART RATE: 55 BPM | WEIGHT: 235 LBS | DIASTOLIC BLOOD PRESSURE: 85 MMHG | SYSTOLIC BLOOD PRESSURE: 145 MMHG | BODY MASS INDEX: 31.83 KG/M2 | OXYGEN SATURATION: 97 %

## 2025-07-10 DIAGNOSIS — R60.0 LOWER EXTREMITY EDEMA: ICD-10-CM

## 2025-07-10 DIAGNOSIS — R06.2 WHEEZING: ICD-10-CM

## 2025-07-10 DIAGNOSIS — I48.91 ATRIAL FIBRILLATION, UNSPECIFIED TYPE (MULTI): ICD-10-CM

## 2025-07-10 DIAGNOSIS — Z86.79 HISTORY OF CARDIOVASCULAR DISORDER: ICD-10-CM

## 2025-07-10 DIAGNOSIS — I10 HYPERTENSION, UNSPECIFIED TYPE: ICD-10-CM

## 2025-07-10 DIAGNOSIS — Z41.9 SURGERY, ELECTIVE: Primary | ICD-10-CM

## 2025-07-10 PROBLEM — J34.89 NASAL CAVITY MASS: Status: ACTIVE | Noted: 2025-07-10

## 2025-07-10 LAB
ABO GROUP (TYPE) IN BLOOD: NORMAL
ANION GAP SERPL CALC-SCNC: 12 MMOL/L (ref 10–20)
ANTIBODY SCREEN: NORMAL
BUN SERPL-MCNC: 13 MG/DL (ref 6–23)
CALCIUM SERPL-MCNC: 9.6 MG/DL (ref 8.6–10.6)
CHLORIDE SERPL-SCNC: 101 MMOL/L (ref 98–107)
CO2 SERPL-SCNC: 33 MMOL/L (ref 21–32)
CREAT SERPL-MCNC: 0.97 MG/DL (ref 0.5–1.3)
EGFRCR SERPLBLD CKD-EPI 2021: 82 ML/MIN/1.73M*2
GLUCOSE SERPL-MCNC: 116 MG/DL (ref 74–99)
MAGNESIUM SERPL-MCNC: 2.24 MG/DL (ref 1.6–2.4)
POTASSIUM SERPL-SCNC: 3.9 MMOL/L (ref 3.5–5.3)
RH FACTOR (ANTIGEN D): NORMAL
SODIUM SERPL-SCNC: 142 MMOL/L (ref 136–145)

## 2025-07-10 PROCEDURE — 99204 OFFICE O/P NEW MOD 45 MIN: CPT | Performed by: ANESTHESIOLOGY

## 2025-07-10 PROCEDURE — 71046 X-RAY EXAM CHEST 2 VIEWS: CPT

## 2025-07-10 PROCEDURE — 83735 ASSAY OF MAGNESIUM: CPT

## 2025-07-10 PROCEDURE — 86900 BLOOD TYPING SEROLOGIC ABO: CPT

## 2025-07-10 PROCEDURE — 80048 BASIC METABOLIC PNL TOTAL CA: CPT

## 2025-07-10 PROCEDURE — 71046 X-RAY EXAM CHEST 2 VIEWS: CPT | Performed by: RADIOLOGY

## 2025-07-10 PROCEDURE — 36415 COLL VENOUS BLD VENIPUNCTURE: CPT

## 2025-07-10 RX ORDER — METOPROLOL TARTRATE 25 MG/1
25 TABLET, FILM COATED ORAL 2 TIMES DAILY
Qty: 180 TABLET | Refills: 3 | Status: SHIPPED | OUTPATIENT
Start: 2025-07-10 | End: 2026-07-10

## 2025-07-10 RX ORDER — ALBUTEROL SULFATE 90 UG/1
2 INHALANT RESPIRATORY (INHALATION) EVERY 6 HOURS
Qty: 8.5 G | Refills: 0 | Status: SHIPPED | OUTPATIENT
Start: 2025-07-16 | End: 2025-07-18

## 2025-07-10 RX ORDER — CHLORHEXIDINE GLUCONATE 40 MG/ML
1 SOLUTION TOPICAL DAILY
Qty: 25 ML | Refills: 0 | Status: SHIPPED | OUTPATIENT
Start: 2025-07-10 | End: 2025-07-18

## 2025-07-10 RX ORDER — CHLORHEXIDINE GLUCONATE ORAL RINSE 1.2 MG/ML
15 SOLUTION DENTAL DAILY
Qty: 30 ML | Refills: 0 | Status: SHIPPED | OUTPATIENT
Start: 2025-07-10 | End: 2025-07-18

## 2025-07-10 ASSESSMENT — DUKE ACTIVITY SCORE INDEX (DASI)
CAN YOU PARTICIPATE IN STRENOUS SPORTS LIKE SWIMMING, SINGLES TENNIS, FOOTBALL, BASKETBALL, OR SKIING: NO
CAN YOU PARTICIPATE IN MODERATE RECREATIONAL ACTIVITIES LIKE GOLF, BOWLING, DANCING, DOUBLES TENNIS OR THROWING A BASEBALL OR FOOTBALL: NO
CAN YOU WALK INDOORS, SUCH AS AROUND YOUR HOUSE: YES
CAN YOU HAVE SEXUAL RELATIONS: NO
CAN YOU DO LIGHT WORK AROUND THE HOUSE LIKE DUSTING OR WASHING DISHES: YES
DASI METS SCORE: 7
CAN YOU PARTICIPATE IN MODERATE RECREATIONAL ACTIVITIES LIKE GOLF, BOWLING, DANCING, DOUBLES TENNIS OR THROWING A BASEBALL OR FOOTBALL: YES
CAN YOU TAKE CARE OF YOURSELF (EAT, DRESS, BATHE, OR USE TOILET): YES
CAN YOU WALK A BLOCK OR TWO ON LEVEL GROUND: YES
CAN YOU CLIMB A FLIGHT OF STAIRS OR WALK UP A HILL: YES
CAN YOU RUN A SHORT DISTANCE: NO
TOTAL_SCORE: 34.7
CAN YOU TAKE CARE OF YOURSELF (EAT, DRESS, BATHE, OR USE TOILET): YES
CAN YOU CLIMB A FLIGHT OF STAIRS OR WALK UP A HILL: YES
CAN YOU DO YARD WORK LIKE RAKING LEAVES, WEEDING OR PUSHING A MOWER: NO
CAN YOU HAVE SEXUAL RELATIONS: YES
DASI METS SCORE: 5.1
CAN YOU DO LIGHT WORK AROUND THE HOUSE LIKE DUSTING OR WASHING DISHES: YES
CAN YOU RUN A SHORT DISTANCE: NO
CAN YOU PARTICIPATE IN STRENOUS SPORTS LIKE SWIMMING, SINGLES TENNIS, FOOTBALL, BASKETBALL, OR SKIING: NO
CAN YOU DO YARD WORK LIKE RAKING LEAVES, WEEDING OR PUSHING A MOWER: YES
CAN YOU WALK A BLOCK OR TWO ON LEVEL GROUND: YES
CAN YOU WALK INDOORS, SUCH AS AROUND YOUR HOUSE: YES
CAN YOU DO MODERATE WORK AROUND THE HOUSE LIKE VACUUMING, SWEEPING FLOORS OR CARRYING GROCERIES: YES
CAN YOU DO HEAVY WORK AROUND THE HOUSE LIKE SCRUBBING FLOORS OR LIFTING AND MOVING HEAVY FURNITURE: NO
CAN YOU DO HEAVY WORK AROUND THE HOUSE LIKE SCRUBBING FLOORS OR LIFTING AND MOVING HEAVY FURNITURE: NO
TOTAL_SCORE: 18.95
CAN YOU DO MODERATE WORK AROUND THE HOUSE LIKE VACUUMING, SWEEPING FLOORS OR CARRYING GROCERIES: YES

## 2025-07-10 ASSESSMENT — ENCOUNTER SYMPTOMS
CONSTITUTIONAL NEGATIVE: 1
GASTROINTESTINAL NEGATIVE: 1
COUGH: 1
NEUROLOGICAL NEGATIVE: 1
WHEEZING: 1
ENDOCRINE NEGATIVE: 1
CARDIOVASCULAR NEGATIVE: 1

## 2025-07-10 NOTE — CPM/PAT H&P
CPM/PAT Evaluation       Name: Christiano Masters (Christiano Masters)  /Age: 1950/74 y.o.     In-Person       Chief Complaint: Endoscopic resection of sinonasl mass     HPI 74 y.o.  male  scheduled for Endoscopic resection of sinonasl mass on 25 with Dr. Francisco for squamous cell carcinoma of maxillary sinus.  PMHX includes  A.Fib on Eliquis, Acute hepatitis C, anxiety, arteriosclerotic cardiovascular disease, BPH, CKD, GERD, HTN, mitral valve insufficiency, prostate CA, hepatitis B + and monoclonal gammopathy.  Presents to [facility] today for perioperative risk stratification and optimization    Medical History[1]    Surgical History[2]    Patient  reports being sexually active.    Family History[3]    Allergies[4]    Prior to Admission medications    Medication Sig Start Date End Date Taking? Authorizing Provider   albuterol 90 mcg/actuation inhaler Inhale 2 puffs every 6 hours if needed for wheezing. 25  Tadeo Woody PA-C   amLODIPine (Norvasc) 10 mg tablet TAKE 1 TABLET BY MOUTH EVERY DAY 25   Srinivas Pierce PA-C   apixaban (Eliquis) 5 mg tablet Take 1 tablet (5 mg) by mouth 2 times a day. 25   Mitch Vazquez MD   chlorhexidine (Hibiclens) 4 % external liquid Use as directed daily perioperatively  Patient not taking: Reported on 2025 6/3/25   MARCIE Milton-CNP   chlorhexidine (Peridex) 0.12 % solution Swish and spit with 15ml of solution the night before and morning of surgery. Do not swallow.  Patient not taking: Reported on 2025 6/3/25   JAYSHREE Milton   losartan-hydrochlorothiazide (Hyzaar) 100-12.5 mg tablet Take 1 tablet by mouth once daily. 25   Srinivas Pierce PA-C   metoprolol tartrate (Lopressor) 25 mg tablet Take 1 tablet (25 mg) by mouth 2 times a day. 24  Gold Alicea MD   multivitamin tablet Take 1 tablet by mouth once daily.    Historical Provider, MD   pravastatin (Pravachol) 40 mg tablet Take 1 tablet (40 mg) by  mouth once daily at bedtime. 2/8/24 6/20/25  Ari Arnold MD        PAT ROS:   Constitutional:   neg    Neuro/Psych:   neg    Eyes:   Ears:   Nose:   neg    Mouth:   Throat:   Neck:   Cardio:   neg    Respiratory:    cough   wheezing  Endocrine:   neg    GI:   neg    :   neg    Musculoskeletal:   Hematologic:   neg    Skin:      Physical Exam  Constitutional:       Appearance: Normal appearance.   HENT:      Head: Normocephalic and atraumatic.   Cardiovascular:      Rate and Rhythm: Normal rate and regular rhythm.      Pulses: Normal pulses.      Heart sounds: Normal heart sounds.   Pulmonary:      Effort: Pulmonary effort is normal.      Breath sounds: Normal breath sounds.   Abdominal:      General: Bowel sounds are normal.      Palpations: Abdomen is soft.   Musculoskeletal:         General: Normal range of motion.      Right lower leg: Edema present.      Left lower leg: Edema present.   Skin:     General: Skin is warm and dry.   Neurological:      General: No focal deficit present.      Mental Status: He is alert and oriented to person, place, and time. Mental status is at baseline.   Psychiatric:         Mood and Affect: Mood normal.         Behavior: Behavior normal.         Thought Content: Thought content normal.          PAT AIRWAY:   Airway:     Mallampati::  III    TM distance::  >3 FB    Neck ROM::  Full   upper dentures and partials      Testing/Diagnostic:     Patient Specialist/PCP:     Visit Vitals  Smoking Status Former       DASI Risk Score      Flowsheet Row Pre-Admission Testing from 6/3/2025 in [facility]   Can you take care of yourself (eat, dress, bathe, or use toilet)?  2.75 filed at 06/03/2025 0807   Can you walk indoors, such as around your house? 1.75 filed at 06/03/2025 0807   Can you walk a block or two on level ground?  2.75 filed at 06/03/2025 0807   Can you climb a flight of stairs or walk up a hill? 0 filed at 06/03/2025 0807   Can you run a short distance? 0  filed at 06/03/2025 0807   Can you do light work around the house like dusting or washing dishes? 2.7 filed at 06/03/2025 0807   Can you do moderate work around the house like vacuuming, sweeping floors or carrying groceries? 3.5 filed at 06/03/2025 0807   Can you do heavy work around the house like scrubbing floors or lifting and moving heavy furniture?  8 filed at 06/03/2025 0807   Can you do yard work like raking leaves, weeding or pushing a mower? 0 filed at 06/03/2025 0807   Can you have sexual relations? 5.25 filed at 06/03/2025 0807   Can you participate in moderate recreational activities like golf, bowling, dancing, doubles tennis or throwing a baseball or football? 6 filed at 06/03/2025 0807   Can you participate in strenous sports like swimming, singles tennis, football, basketball, or skiing? 0 filed at 06/03/2025 0807   DASI SCORE 32.7 filed at 06/03/2025 0807   METS Score (Will be calculated only when all the questions are answered) 6.8 filed at 06/03/2025 0807          Caprini DVT Assessment      Flowsheet Row Pre-Admission Testing from 6/3/2025 in [facility] Pre-Admission Testing from 2/29/2024 in [facility] with JAYSHREE Chang   DVT Score (IF A SCORE IS NOT CALCULATING, MUST SELECT A BMI TO COMPLETE) 8 filed at 06/03/2025 0731 6 filed at 02/29/2024 1130   Medical Factors Present cancer, chemotherapy, or previous malignancy filed at 06/03/2025 0731 --   Surgical Factors Major surgery planned, lasting 2-3 hours filed at 06/03/2025 0731 --   BMI (BMI MUST BE CHOSEN) 30 or less filed at 06/03/2025 0731 31-40 (Obesity) filed at 02/29/2024 1130   RETIRED: Current Status -- Minor surgery planned filed at 02/29/2024 1130   RETIRED: Age -- 60-75 years filed at 02/29/2024 1130          Modified Frailty Index      Flowsheet Row Pre-Admission Testing from 6/3/2025 in [facility]   Non-independent functional status (problems with dressing, bathing,  personal grooming, or cooking) 0 filed at 06/03/2025 0848   History of diabetes mellitus  0 filed at 06/03/2025 0848   History of COPD 0 filed at 06/03/2025 0848   History of CHF No filed at 06/03/2025 0848   History of MI 0 filed at 06/03/2025 0848   History of Percutaneous Coronary Intervention, Cardiac Surgery, or Angina No filed at 06/03/2025 0848   Hypertension requiring the use of medication  0.0909 filed at 06/03/2025 0848   Peripheral vascular disease 0 filed at 06/03/2025 0848   Impaired sensorium (cognitive impairement or loss, clouding, or delirium) 0 filed at 06/03/2025 0848   TIA or CVA withouy residual deficit 0 filed at 06/03/2025 0848   Cerebrovascular accident with deficit 0 filed at 06/03/2025 0848   Modified Frailty Index Calculator .0909 filed at 06/03/2025 0848          [id number] Stroke Risk Points  Current as of 6 hours ago        2 0 to 9 Points:      No Change          The [id number]-[id number] risk score (Lip PAUL, et al. 2009. © 2010 [employer]) quantifies the risk of stroke for a patient with atrial fibrillation. For patients without atrial fibrillation or under the age of 18 this score appears as N/A. Higher score values generally indicate higher risk of stroke.          Points Metrics   0 Has Congestive Heart Failure:  No     Patients with congestive heart failure get 1 point.    Current as of 6 hours ago   1 Has Hypertension:  Yes     Patients with hypertension get 1 point.    Current as of 6 hours ago   1 Age:  74     Patients 65 to 74 years old get 1 point, or patients 75 years and older get 2 points.    Current as of 6 hours ago   0 Has Diabetes:  No     Patients with diabetes get 1 point.    Current as of 6 hours ago   0 Had Stroke:  No  Had TIA:  No  Had Thromboembolism:  No     Patients who have had a stroke, TIA, or thromboembolism get 2 points.    Current as of 6 hours ago   0 Has Vascular Disease:  No     Patients with vascular disease get 1 point.    Current  as of 6 hours ago   0 Clinically Relevant Sex:  Male     Patients with a clinically relevant sex of Female get 1 point.    Current as of 6 hours ago             Revised Cardiac Risk Index      Flowsheet Row Pre-Admission Testing from 6/3/2025 in [facility]   High-Risk Surgery (Intraperitoneal, Intrathoracic,Suprainguinal vascular) 0 filed at 06/03/2025 0732   History of ischemic heart disease (History of MI, History of positive exercuse test, Current chest paint considered due to myocardial ischemia, Use of nitrate therapy, ECG with pathological Q Waves) 1 filed at 06/03/2025 0732   History of congestive heart failure (pulmonary edemia, bilateral rales or S3 gallop, Paroxysmal nocturnal dyspnea, CXR showing pulmonary vascular redistribution) 0 filed at 06/03/2025 0732   History of cerebrovascular disease (Prior TIA or stroke) 0 filed at 06/03/2025 0732   Pre-operative insulin treatment 0 filed at 06/03/2025 0732   Pre-operative creatinine>2 mg/dl 0 filed at 06/03/2025 0732   Revised Cardiac Risk Calculator 1 filed at 06/03/2025 0732          Apfel Simplified Score      Flowsheet Row Pre-Admission Testing from 6/3/2025 in [facility]   Smoking status 1 filed at 06/03/2025 0848   History of motion sickness or PONV  0 filed at 06/03/2025 0848   Use of postoperative opioids 1 filed at 06/03/2025 0848   Gender - Female 0=No filed at 06/03/2025 0848   Apfel Simplified Score Calculator 2 filed at 06/03/2025 0848          Risk Analysis Index Results This Encounter    No data found in the last 10 encounters.       Stop Bang Score      Flowsheet Row Pre-Admission Testing from 6/3/2025 in [facility]   Do you snore loudly? 1 filed at 06/03/2025 0810   Do you often feel tired or fatigued after your sleep? 1 filed at 06/03/2025 0810   Has anyone ever observed you stop breathing in your sleep? 0 filed at 06/03/2025 0810   Do you have or are you being treated for high blood  pressure? 1 filed at 06/03/2025 0810   Recent BMI (Calculated) 28.5 filed at 06/03/2025 0810   Is BMI greater than 35 kg/m2? 0=No filed at 06/03/2025 0810   Age older than 50 years old? 1=Yes filed at 06/03/2025 0810   Is your neck circumference greater than 17 inches (Male) or 16 inches (Female)? 1 filed at 06/03/2025 0810   Gender - Male 1=Yes filed at 06/03/2025 0810   STOP-BANG Total Score 6 filed at 06/03/2025 0810          Prodigy: High Risk  Total Score: 20              Prodigy Age Score      Prodigy Gender Score          ARISCAT Score for Postoperative Pulmonary Complications      Flowsheet Row Pre-Admission Testing from 6/3/2025 in [facility]   Age Calculated Score 3 filed at 06/03/2025 0849   Preoperative SpO2 0 filed at 06/03/2025 0849   Respiratory infection in the last month Either upper or lower (i.e., URI, bronchitis, pneumonia), with fever and antibiotic treatment 17 filed at 06/03/2025 0849   Preoperative anemia (Hgb less than 10 g/dl) 0 filed at 06/03/2025 0849   Surgical incision  0 filed at 06/03/2025 0849   Duration of surgery  16 filed at 06/03/2025 0849   Emergency Procedure  0 filed at 06/03/2025 0849   ARISCAT Total Score  36 filed at 06/03/2025 0849          Emmie Perioperative Risk for Myocardial Infarction or Cardiac Arrest (EMIGDIO)    No data to display           Assessment and Plan     History of Present Illness     74 y.o.  male  scheduled for Endoscopic resection of sinonasl mass on 7/18/25 with Dr. Francisco for squamous cell carcinoma of maxillary sinus.  PMHX includes  A.Fib on Eliquis, Acute hepatitis C, anxiety, arteriosclerotic cardiovascular disease, BPH, CKD, GERD, HTN, mitral valve insufficiency, prostate CA, hepatitis B + and monoclonal gammopathy.  Presents to CPM today for perioperative risk stratification and optimization      Neuro   Denies hx of seizures or strokes    No neurologic diagnosis or significant findings on chart review, clinical presentation  and evaluation.  No grossly apparent neurologic perioperative risk.    Patient is at increased risk for perioperative CVA secondary to  Afib, perioperative interruption of anticoagulant, HTN, increased age      HEENT   #Inverted papilloma right nasal cavity   - Has seen Dr. Francisco with otolaryngology  - Planning for a endoscopic resection of sinonasal mass  - initially scheduled for 6/17/25 however recommended that eliquis not be stopped for 4 weeks following cardioversion which would be 6/22    Cardiovascular   AF- s/p cardioversion 05/22/2025. Managed on metoprolol and eliquis  S/p PVI in December of 2022 with a recurrence in April 2025  - Had recent cardioversion with Dr. Vazquez on 5/22/25 due to trial of stopping Eliquis, Eliquis was reinitiated   Hx of HTN on amlodipine and losartan-hydrochlorothiazide   Hx of HLD on pravastatin     Seen by Dr. Vazquez on 6/19/25 who states patient is cleared for surgery. Eliquis can be stopped as he has normal renal function 48 hours prior to the surgery to be restarted as soon as no contraindications after the surgery.     METS: 5.1  RCRI: 1 point, 6.0% risk for postoperative MACE   EMIGDIO: 1.3% risk for 30-day postoperative MACE  EKG - 6/19/25  Sinus bradycardia  Minimal voltage criteria for LVH, may be normal variant (no change from EKG from 6/3/25)    ECHO 5/27/22  1. The left ventricular systolic function is normal with a 60-65% estimated ejection fraction.   2. RVSP within normal limits.   3. There is mild to moderate tricuspid regurgitation.   4. There is moderate pulmonic valve regurgitation.   5. Compared with the prior exam from 7/29/2015 ([facility]) there are no significant changes except the trivial to small pericardial effusion is new.   6. The patient is in atrial fibrillation which may influence the estimate of left ventricular function and transvalvular flows.  7. There is normal right ventricular global systolic function.     (Last seen in PAT  clinic 6/03/25 with a repeat ECHO ordered, however patient did not get this done).  Patient does not remember needing to go to Echo on 6/06/25. Will order repeat Echo given bilateral LE swelling. Patient thinks may be caused by new combination pill but will send for an Echo and follow up with a PCP (will look for appointment before surgery date).    Pulmonary   #JEFFREY, not using CPAP   No hx of asthma or COPd; not a smoker     recent URI went to urgent care on 5/25/25, was prescribed Zithromax, solu-medrol and inhaler. Repeat CXR ordered on 6/03, patient did not have done. Sent today for repeat CXR and given script for albuterol inhaler to use 2 days prior to surgery and morning of. Will f/up with results.    No pulmonary diagnosis, however patient is at increased risk of perioperative complications secondary to  age > 60, obesity, respiratory infection within 4-6 weeks  Stop Bang score is 6 placing patient at high risk for JEFFREY  ARISCAT: 26-44 points, 13.3% risk of in-hospital postoperative pulmonary complication  PRODIGY: High risk for opioid-induced respiratory depression  Pumonary toilet education discussed, patient also provided deep breathing exercises and incentive spirometry educational handout    Renal   #BPH, Managed on Flomax (continue)   Hx of prostate cancer    No renal diagnosis, however patient is at increase risk for perioperative renal complications secondary to  Age equal to or greater than 56, HTN, use of an ace, arb, or NSAID    Endocrine   No endocrine diagnosis or significant findings on chart review or clinical presentation and evaluation. No further testing or intervention is indicated at this time.    Last A1C 5.8% (7/2/24)    Hematologic   Monoclonal Gammopathy of Undetermined Significance; follows with hematology and oncology  - 0.5g/dL M protein IgA Kappa  - Kappa FLC 2.39mg/dL, FLC ratio 2.25  - Spot UPEP showed 4.6% of total urine protein Kappa FLC  - Osseous survey negative   - Negative  "SLiM CRAB criteria    Caprini Score 8, patient at High risk for perioperative DVT.  Patient provided VTE education/handout.      Gastrointestinal   #GERD, OTC medications as needed     Eat-10 score 0  Apfel 2      Infectious Disease   #Hepatitis B&C - s/p Harvoni ([id number]) with neg VL and stable since    Prescription provided for CHG body wash and dental rinse. CHG use instructions reviewed and provided to patient.  Musculoskeletal   No diagnosis or significant findings on chart review or clinical presentation and evaluation.       Anesthesia/Airway   No anesthesia complications    Labs ordered: T&S, BMP, Mg, CXR    Medication, NPO, and all other CPM instructions were reviewed with the patient.  All patient questions were addressed.    Patient seen and staffed with Dr. Roe           [1]   Past Medical History:  Diagnosis Date    A-fib (Multi)     on Eliquis, pre surgery stop instructions and clearance in 2/9/24 \"letters\"    Acute hepatitis C     Genotype 1a, s/p Harvoni [id number]    Anxiety     ASCVD (arteriosclerotic cardiovascular disease)     Benign prostatic hyperplasia with lower urinary tract symptoms     Chronic pain disorder     back    CKD (chronic kidney disease)     CKD (chronic kidney disease)     Coronary artery disease     Elevated PSA 12/21/2023    5.97    GERD (gastroesophageal reflux disease)     Hepatitis B     + antibody    Hyperaldosteronism     Hypercholesteremia     Hypertension     Irregular heart beat     Mitral valve insufficiency     Monoclonal gammopathy     MGUS followed by Hem/Onc Dr. Ochoa    JEFFREY (obstructive sleep apnea)     Prostate cancer (Multi)     RT and ADT [id number]    Tobacco use disorder    [2]   Past Surgical History:  Procedure Laterality Date    CARDIAC ELECTROPHYSIOLOGY PROCEDURE N/A 5/22/2025    Procedure: Cardioversion;  Surgeon: Mitch Vazquez MD;  Location: [place] [address] Cardiac Cath Lab;  Service: Electrophysiology;  Laterality: N/A;  CPT: 87146    CARDIOVERSION  " 12/2022    and Ablation for A Fib    COLONOSCOPY  08/26/2019    Colonoscopy    HERNIA REPAIR  11/15/2021    Hernia repair    PROSTATE BIOPSY  06/11/2014   [3]   Family History  Family history unknown: Yes   [4] No Known Allergies

## 2025-07-10 NOTE — PROGRESS NOTES
Subjective   Patient ID:   Christiano Masters is a 74 y.o. male who presents for No chief complaint on file..  HPI  Date of surgery: 7/18/25.  Type of surgery: Endoscopic resection of sinonasal mass  Surgeon performing: Dr. Francisco  Labs/tests: Did pre-admission testing on 7/10/25.  Previous stress test: Follows with cardiology  Smoking status: Former smoker.  History of DVT/PE: ????  Prior anesthesia: Tolerates well  Blood thinners: Eliquis  CP or SOB: Denies    Prostate cancer:  Had treatment about 10 years ago.  Following with oncology and urology.    A-fib/HTN:  Following with cardiology.  Taking Amlodipine, Losartan-hydrochlorothiazide, Metoprolol, Eliquis.  BP today is     Hep C:  Had treatment in the past.    HLD:  Taking Pravastatin.  Last checked July 2024.  DUE for re-check.    Health maintenance:  Smoking: Former smoker.  PSA (50+): Nov 2024  Labs: Nov 2024. DUE for lipid  Colonoscopy (50-75): 2023  Prevnar 13 (65+): 2015  Pneumovax 23 (65+, 1 year after Prev 13): 2019  Influenza:  Shingrix (2 doses, 2-6 months apart):    Review of Systems  12 point review of systems negative unless stated above in HPI    There were no vitals filed for this visit.    Physical Exam  General: Alert and oriented, well nourished, no acute distress.  Lungs: Clear to auscultation, non-labored respiration.  Heart: Normal rate, regular rhythm, no murmur, gallop or edema.  Neurologic: Awake, alert, and oriented X3, CN II-XII intact.  Psychiatric: Cooperative, appropriate mood and affect.    Assessment/Plan   Diagnoses and all orders for this visit:  Nasal cavity mass  Atrial fibrillation, unspecified type (Multi)  Hypertension, unspecified type  Prostate cancer (Multi)  Sleep apnea, unspecified type  Hyperlipidemia, unspecified hyperlipidemia type  Chronic hepatitis C without hepatic coma (Multi)  Pre-op exam

## 2025-07-10 NOTE — PREPROCEDURE INSTRUCTIONS
Thank you for visiting [facility] ([facility]) today for your pre-procedure evaluation, you were seen by Dr. Newell ([phone])     This summary includes instructions and information to aid you during your perioperative period.  Please read carefully. If you have any questions about your visit today, please call the number listed above.  If you become ill or have any changes to your health before your surgery, please contact your primary care provider and alert your surgeon.    Preparing for your Surgery       Exercises  Preoperative Deep Breathing Exercises  Why it is important to do deep breathing exercises before my surgery?  Deep breathing exercises strengthen your breathing muscles.  This helps you to recover after your surgery and decreases the chance of breathing complications.  How are the deep breathing exercises done?  Sit straight with your back supported.  Breathe in deeply and slowly through your nose. Your lower rib cage should expand and your abdomen may move forward.  Hold that breath for 3 to 5 seconds.  Breathe out through pursed lips, slowly and completely.  Rest and repeat 10 times every hour while awake.  Rest longer if you become dizzy or lightheaded.      Incentive Spirometer  You were provided with an incentive spirometer in [facility], please follow the below instructions.  An incentive spirometer is a device used before and after surgery to “exercise” your lungs.  It helps you to take deeper breaths to expand your lungs.  Below is an example of a basic incentive spirometer.  The device you receive may differ slightly but they all function the same.    Why do I need to use an incentive spirometer?  Using your incentive spirometer prepares your lungs for surgery and helps prevent lung problems after surgery.  How do I use my incentive spirometer?  When you're using your incentive spirometer, make sure to breathe through your mouth. If you breathe through your nose, the  incentive spirometer won't work properly. You can hold your nose if you have trouble.  If you feel dizzy at any time, stop and rest. Try again at a later time.  Follow the steps below:  Set up your incentive spirometer, expand the flexible tubing and connect to the outlet.  Sit upright in a chair or bed. Hold the incentive spirometer at eye level.   Put the mouthpiece in your mouth and close your lips tightly around it. Slowly breathe out (exhale) completely.  Breathe in (inhale) slowly through your mouth as deeply as you can. As you take a breath, you will see the piston rise inside the large column. While the piston rises, the indicator should move upwards. It should stay in between the 2 arrows (see Figure).  Try to get the piston as high as you can, while keeping the indicator between the arrows.   If the indicator doesn't stay between the arrows, you're breathing either too fast or too slow.  When you get it as high as you can, hold your breath for 10 seconds, or as long as possible. While you're holding your breath, the piston will slowly fall to the base of the spirometer.  Once the piston reaches the bottom of the spirometer, breathe out slowly through your mouth. Rest for a few seconds.  Repeat 10 times. Try to get the piston to the same level with each breath.  Repeat every hour while awake  You can carefully clean the outside of the mouthpiece with an alcohol wipe or soap and water.      Preoperative Brain Exercises    What are brain exercises?  A brain exercise is any activity that engages your thinking (cognitive) skills.    What types of activities are considered brain exercises?  Jigsaw puzzles, crossword puzzles, word jumble, memory games, word search, and many more.  Many can be found free online or on your phone via a mobile [place].    Why should I do brain exercises before my surgery?  More recent research has shown brain exercise before surgery can lower the risk of postoperative delirium  (confusion) which can be especially important for older adults.  Patients who did brain exercises for 5 to 10 hours the days before surgery, cut their risk of postoperative delirium in half up to 1 week after surgery.    Sit-to-Stand Exercise    What is the sit-to-stand exercise?  The sit-to-stand exercise strengthens the muscles of your lower body and muscles in the center of your body (core muscles for stability) helping to maintain and improve your strength and mobility.  How do I do the sit-to-stand exercise?  The goal is to do this exercise without using your arms or hands.  If this is too difficult, use your arms and hands or a chair with armrests to help slowly push yourself to the standing position and lower yourself back to the sitting position. As the movement becomes easier use your arms and hands less.    Steps to the sit-to-stand exercise  Sit up tall in a sturdy chair, knees bent, feet flat on the floor shoulder-width apart.  Shift your hips/pelvis forward in the chair to correctly position yourself for the next movement.  Lean forward at your hips.  Stand up straight putting equal weight on both feet.  Check to be sure you are properly aligned with the chair, in a slow controlled movement sit back down.  Repeat this exercise 10-15 times.  If needed you can do it fewer times until your strength improves.  Rest for 1 minute.  Do another 10-15 sit-to-stand exercises.  Try to do this in the morning and evening.        Instructions    Preoperative Fasting Guidelines    Why must I stop eating and drinking near surgery time?  With sedation, food or liquid in your stomach can enter your lungs causing serious complications  Food can increase nausea and vomiting  When do I need to stop eating and drinking before my surgery?      Do not eat any food after midnight the night before your surgery/procedure. You may have up to 13.5 ounces of clear liquid until TWO hours before your instructed arrival time to the  hospital.  This includes water, black tea/coffee, (no milk or cream) apple juice, and electrolyte drinks (Gatorade). You may chew gum until TWO hours before your surgery/procedure            Simple things you can do to help prevent blood clots     Blood clots are blockages that can form in the body's veins. When a blood clot forms in your deep veins, it may be called a deep vein thrombosis, or DVT for short. Blood clots can happen in any part of the body where blood flows, but they are most common in the arms and legs. If a piece of a blood clot breaks free and travels to the lungs, it is called a pulmonary embolus (PE). A PE can be a very serious problem.         Being in the hospital or having surgery can raise your chances of getting a blood clot because you may not be well enough to move around as much as you normally do.         Ways you can help prevent blood clots in the hospital       Wearing SCDs  SCDs stands for Sequential Compression Devices.   SCDs are special sleeves that wrap around your legs. They attach to a pump that fills them with air to gently squeeze your legs every few minutes.  This helps return the blood in your legs to your heart.   SCDs should only be taken off when walking or bathing. SCDs may not be comfortable, but they can help save your life.              Pump SCD leg sleeves  Wearing compression stockings - if your doctor orders them. These special snug-fitting stockings gently squeeze your legs to help blood flow.       Walking. Walking helps move the blood in your legs.   If your doctor says it is ok, try walking the halls at least   5 times a day. Ask us to help you get up, so you don't fall.      Taking any blood-thinning medicines your doctor orders.              Ways you can help prevent blood clots at home         Wearing compression stockings - if your doctor orders them.   Walking - to help move the blood in your legs.    Taking any blood-thinning medicines your doctor  orders.      Signs of a blood clot or PE    Tell your doctor or nurse right away if you have any of the problems listed below.         If you are at home, seek medical care right away. Call 911 for chest pain or problems breathing.            Signs of a blood clot (DVT) - such as pain, swelling, redness, or warmth in your arm or legs.  Signs of a pulmonary embolism (PE) - such as chest pain or feeling short of breath      Tobacco and Alcohol;  Do not drink alcohol or smoke within 24 hours of surgery.  It is best to quit smoking for as long as possible before any surgery or procedure.      The Week before Surgery        Seven days before Surgery  Check your [facility] medication instructions  Do the exercises provided to you by [facility]   Arrange for a responsible, adult licensed  to take you home after surgery and stay with you for 24 hours.  You will not be permitted to drive yourself home if you have received any anesthetic/sedation  Six days before surgery  Check your [facility] medication instructions  Do the exercises provided to you by [facility]   Start using Chlorhexidene (CHG) body wash if prescribed  Five days before surgery  Check your [facility] medication instructions  Do the exercises provided to you by [facility]   Continue to use CHG body wash if prescribed  Three days before surgery  Check your [facility] medication instructions  Do the exercises provided to you by [facility]   Continue to use CHG body wash if prescribed  Two days before surgery  Check your [facility] medication instructions  Do the exercises provided to you by [facility]   Continue to use CHG body wash if prescribed    The Day before Surgery       Check your [facility] medication and all other [facility] instructions including when to stop eating and drinking  You will be called with your arrival time for surgery in the late afternoon.  If you do not receive a call please reach out to your surgeon's office.  Do not smoke or drink 24 hours before surgery  Prepare items to bring with you to the  hospital  Shower with your chlorhexidine wash if prescribed  Brush your teeth and use your chlorhexidine dental rinse if prescribed    The Day of Surgery       Check your [facility] medication instructions  Ensure you follow the instructions for when to stop eating and drinking  Shower, if prescribed use CHG.  Do not apply any lotions, creams, moisturizers, perfume or deodorant  Brush your teeth and use your CHG dental rinse if prescribed  Wear loose comfortable clothing  Avoid make-up  Remove  jewelry and piercings, consider professional piercing removal with a plastic spacer if needed  Bring photo ID and Insurance card  Bring an accurate medication list that includes medication dose, frequency and allergies  Bring a copy of your advanced directives (will, health care power of )  Bring any devices and controllers as well as medical devices you have been provided with for surgery (CPAP, slings, braces, etc.)  Dentures, eyeglasses, and contacts will be removed before surgery, please bring cases for contacts or glasses

## 2025-07-10 NOTE — H&P (VIEW-ONLY)
CPM/PAT Evaluation       Name: Christiano Masters (Christiano Masters)  /Age: 1950/74 y.o.     In-Person       Chief Complaint: Endoscopic resection of sinonasl mass     HPI 74 y.o.  male  scheduled for Endoscopic resection of sinonasl mass on 25 with Dr. Francisco for squamous cell carcinoma of maxillary sinus.  PMHX includes  A.Fib on Eliquis, Acute hepatitis C, anxiety, arteriosclerotic cardiovascular disease, BPH, CKD, GERD, HTN, mitral valve insufficiency, prostate CA, hepatitis B + and monoclonal gammopathy.  Presents to [facility] today for perioperative risk stratification and optimization    Medical History[1]    Surgical History[2]    Patient  reports being sexually active.    Family History[3]    Allergies[4]    Prior to Admission medications    Medication Sig Start Date End Date Taking? Authorizing Provider   albuterol 90 mcg/actuation inhaler Inhale 2 puffs every 6 hours if needed for wheezing. 25  Tadeo Woody PA-C   amLODIPine (Norvasc) 10 mg tablet TAKE 1 TABLET BY MOUTH EVERY DAY 25   Srinivas Pierce PA-C   apixaban (Eliquis) 5 mg tablet Take 1 tablet (5 mg) by mouth 2 times a day. 25   Mitch Vazquez MD   chlorhexidine (Hibiclens) 4 % external liquid Use as directed daily perioperatively  Patient not taking: Reported on 2025 6/3/25   MARCIE Milton-CNP   chlorhexidine (Peridex) 0.12 % solution Swish and spit with 15ml of solution the night before and morning of surgery. Do not swallow.  Patient not taking: Reported on 2025 6/3/25   JAYSHREE Milton   losartan-hydrochlorothiazide (Hyzaar) 100-12.5 mg tablet Take 1 tablet by mouth once daily. 25   Srinivas Pierce PA-C   metoprolol tartrate (Lopressor) 25 mg tablet Take 1 tablet (25 mg) by mouth 2 times a day. 24  Gold Alicea MD   multivitamin tablet Take 1 tablet by mouth once daily.    Historical Provider, MD   pravastatin (Pravachol) 40 mg tablet Take 1 tablet (40 mg) by  mouth once daily at bedtime. 2/8/24 6/20/25  Ari Arnold MD        PAT ROS:   Constitutional:   neg    Neuro/Psych:   neg    Eyes:   Ears:   Nose:   neg    Mouth:   Throat:   Neck:   Cardio:   neg    Respiratory:    cough   wheezing  Endocrine:   neg    GI:   neg    :   neg    Musculoskeletal:   Hematologic:   neg    Skin:      Physical Exam  Constitutional:       Appearance: Normal appearance.   HENT:      Head: Normocephalic and atraumatic.   Cardiovascular:      Rate and Rhythm: Normal rate and regular rhythm.      Pulses: Normal pulses.      Heart sounds: Normal heart sounds.   Pulmonary:      Effort: Pulmonary effort is normal.      Breath sounds: Normal breath sounds.   Abdominal:      General: Bowel sounds are normal.      Palpations: Abdomen is soft.   Musculoskeletal:         General: Normal range of motion.      Right lower leg: Edema present.      Left lower leg: Edema present.   Skin:     General: Skin is warm and dry.   Neurological:      General: No focal deficit present.      Mental Status: He is alert and oriented to person, place, and time. Mental status is at baseline.   Psychiatric:         Mood and Affect: Mood normal.         Behavior: Behavior normal.         Thought Content: Thought content normal.          PAT AIRWAY:   Airway:     Mallampati::  III    TM distance::  >3 FB    Neck ROM::  Full   upper dentures and partials      Testing/Diagnostic:     Patient Specialist/PCP:     Visit Vitals  Smoking Status Former       DASI Risk Score      Flowsheet Row Pre-Admission Testing from 6/3/2025 in [facility]   Can you take care of yourself (eat, dress, bathe, or use toilet)?  2.75 filed at 06/03/2025 0807   Can you walk indoors, such as around your house? 1.75 filed at 06/03/2025 0807   Can you walk a block or two on level ground?  2.75 filed at 06/03/2025 0807   Can you climb a flight of stairs or walk up a hill? 0 filed at 06/03/2025 0807   Can you run a short distance? 0  filed at 06/03/2025 0807   Can you do light work around the house like dusting or washing dishes? 2.7 filed at 06/03/2025 0807   Can you do moderate work around the house like vacuuming, sweeping floors or carrying groceries? 3.5 filed at 06/03/2025 0807   Can you do heavy work around the house like scrubbing floors or lifting and moving heavy furniture?  8 filed at 06/03/2025 0807   Can you do yard work like raking leaves, weeding or pushing a mower? 0 filed at 06/03/2025 0807   Can you have sexual relations? 5.25 filed at 06/03/2025 0807   Can you participate in moderate recreational activities like golf, bowling, dancing, doubles tennis or throwing a baseball or football? 6 filed at 06/03/2025 0807   Can you participate in strenous sports like swimming, singles tennis, football, basketball, or skiing? 0 filed at 06/03/2025 0807   DASI SCORE 32.7 filed at 06/03/2025 0807   METS Score (Will be calculated only when all the questions are answered) 6.8 filed at 06/03/2025 0807          Caprini DVT Assessment      Flowsheet Row Pre-Admission Testing from 6/3/2025 in [facility] Pre-Admission Testing [facility] JAYSHREE Chang   DVT Score (IF A SCORE IS NOT CALCULATING, MUST SELECT A BMI TO COMPLETE) 8 filed at 06/03/2025 0731 6 filed at 02/29/2024 1130   Medical Factors Present cancer, chemotherapy, or previous malignancy filed at 06/03/2025 0731 --   Surgical Factors Major surgery planned, lasting 2-3 hours filed at 06/03/2025 0731 --   BMI (BMI MUST BE CHOSEN) 30 or less filed at 06/03/2025 0731 31-40 (Obesity) filed at 02/29/2024 1130   RETIRED: Current Status -- Minor surgery planned filed at 02/29/2024 1130   RETIRED: Age -- 60-75 years filed at 02/29/2024 1130          Modified Frailty Index      Flowsheet Row Pre-Admission Testing from 6/3/2025 in [facility]   Non-independent functional status (problems with dressing, bathing,  personal grooming, or cooking) 0 filed at 06/03/2025 0848   History of diabetes mellitus  0 filed at 06/03/2025 0848   History of COPD 0 filed at 06/03/2025 0848   History of CHF No filed at 06/03/2025 0848   History of MI 0 filed at 06/03/2025 0848   History of Percutaneous Coronary Intervention, Cardiac Surgery, or Angina No filed at 06/03/2025 0848   Hypertension requiring the use of medication  0.0909 filed at 06/03/2025 0848   Peripheral vascular disease 0 filed at 06/03/2025 0848   Impaired sensorium (cognitive impairement or loss, clouding, or delirium) 0 filed at 06/03/2025 0848   TIA or CVA withouy residual deficit 0 filed at 06/03/2025 0848   Cerebrovascular accident with deficit 0 filed at 06/03/2025 0848   Modified Frailty Index Calculator .0909 filed at 06/03/2025 0848          [id number] Stroke Risk Points  Current as of 6 hours ago        2 0 to 9 Points:      No Change          The [id number] risk score (Lip PAUL, et al. 2009. © 2010 American [employer]) quantifies the risk of stroke for a patient with atrial fibrillation. For patients without atrial fibrillation or under the age of 18 this score appears as N/A. Higher score values generally indicate higher risk of stroke.          Points Metrics   0 Has Congestive Heart Failure:  No     Patients with congestive heart failure get 1 point.    Current as of 6 hours ago   1 Has Hypertension:  Yes     Patients with hypertension get 1 point.    Current as of 6 hours ago   1 Age:  74     Patients 65 to 74 years old get 1 point, or patients 75 years and older get 2 points.    Current as of 6 hours ago   0 Has Diabetes:  No     Patients with diabetes get 1 point.    Current as of 6 hours ago   0 Had Stroke:  No  Had TIA:  No  Had Thromboembolism:  No     Patients who have had a stroke, TIA, or thromboembolism get 2 points.    Current as of 6 hours ago   0 Has Vascular Disease:  No     Patients with vascular disease get 1 point.    Current  as of 6 hours ago   0 Clinically Relevant Sex:  Male     Patients with a clinically relevant sex of Female get 1 point.    Current as of 6 hours ago             Revised Cardiac Risk Index      Flowsheet Row Pre-Admission Testing from 6/3/2025 in [facility]   High-Risk Surgery (Intraperitoneal, Intrathoracic,Suprainguinal vascular) 0 filed at 06/03/2025 0732   History of ischemic heart disease (History of MI, History of positive exercuse test, Current chest paint considered due to myocardial ischemia, Use of nitrate therapy, ECG with pathological Q Waves) 1 filed at 06/03/2025 0732   History of congestive heart failure (pulmonary edemia, bilateral rales or S3 gallop, Paroxysmal nocturnal dyspnea, CXR showing pulmonary vascular redistribution) 0 filed at 06/03/2025 0732   History of cerebrovascular disease (Prior TIA or stroke) 0 filed at 06/03/2025 0732   Pre-operative insulin treatment 0 filed at 06/03/2025 0732   Pre-operative creatinine>2 mg/dl 0 filed at 06/03/2025 0732   Revised Cardiac Risk Calculator 1 filed at 06/03/2025 0732          Apfel Simplified Score      Flowsheet Row Pre-Admission Testing from 6/3/2025 in [facility]   Smoking status 1 filed at 06/03/2025 0848   History of motion sickness or PONV  0 filed at 06/03/2025 0848   Use of postoperative opioids 1 filed at 06/03/2025 0848   Gender - Female 0=No filed at 06/03/2025 0848   Apfel Simplified Score Calculator 2 filed at 06/03/2025 0848          Risk Analysis Index Results This Encounter    No data found in the last 10 encounters.       Stop Bang Score      Flowsheet Row Pre-Admission Testing from 6/3/2025 in [facility]   Do you snore loudly? 1 filed at 06/03/2025 0810   Do you often feel tired or fatigued after your sleep? 1 filed at 06/03/2025 0810   Has anyone ever observed you stop breathing in your sleep? 0 filed at 06/03/2025 0810   Do you have or are you being treated for high blood  pressure? 1 filed at 06/03/2025 0810   Recent BMI (Calculated) 28.5 filed at 06/03/2025 0810   Is BMI greater than 35 kg/m2? 0=No filed at 06/03/2025 0810   Age older than 50 years old? 1=Yes filed at 06/03/2025 0810   Is your neck circumference greater than 17 inches (Male) or 16 inches (Female)? 1 filed at 06/03/2025 0810   Gender - Male 1=Yes filed at 06/03/2025 0810   STOP-BANG Total Score 6 filed at 06/03/2025 0810          Prodigy: High Risk  Total Score: 20              Prodigy Age Score      Prodigy Gender Score          ARISCAT Score for Postoperative Pulmonary Complications      Flowsheet Row Pre-Admission Testing from 6/3/2025 in [facility]   Age Calculated Score 3 filed at 06/03/2025 0849   Preoperative SpO2 0 filed at 06/03/2025 0849   Respiratory infection in the last month Either upper or lower (i.e., URI, bronchitis, pneumonia), with fever and antibiotic treatment 17 filed at 06/03/2025 0849   Preoperative anemia (Hgb less than 10 g/dl) 0 filed at 06/03/2025 0849   Surgical incision  0 filed at 06/03/2025 0849   Duration of surgery  16 filed at 06/03/2025 0849   Emergency Procedure  0 filed at 06/03/2025 0849   ARISCAT Total Score  36 filed at 06/03/2025 0849          Emmie Perioperative Risk for Myocardial Infarction or Cardiac Arrest (EMIGDIO)    No data to display           Assessment and Plan     History of Present Illness     74 y.o.  male  scheduled for Endoscopic resection of sinonasl mass on 7/18/25 with Dr. Francisco for squamous cell carcinoma of maxillary sinus.  PMHX includes  A.Fib on Eliquis, Acute hepatitis C, anxiety, arteriosclerotic cardiovascular disease, BPH, CKD, GERD, HTN, mitral valve insufficiency, prostate CA, hepatitis B + and monoclonal gammopathy.  Presents to CPM today for perioperative risk stratification and optimization      Neuro   Denies hx of seizures or strokes    No neurologic diagnosis or significant findings on chart review, clinical presentation  and evaluation.  No grossly apparent neurologic perioperative risk.    Patient is at increased risk for perioperative CVA secondary to  Afib, perioperative interruption of anticoagulant, HTN, increased age      HEENT   #Inverted papilloma right nasal cavity   - Has seen Dr. Francisco with otolaryngology  - Planning for a endoscopic resection of sinonasal mass  - initially scheduled for 6/17/25 however recommended that eliquis not be stopped for 4 weeks following cardioversion which would be 6/22    Cardiovascular   AF- s/p cardioversion 05/22/2025. Managed on metoprolol and eliquis  S/p PVI in December of 2022 with a recurrence in April 2025  - Had recent cardioversion with Dr. Vazquez on 5/22/25 due to trial of stopping Eliquis, Eliquis was reinitiated   Hx of HTN on amlodipine and losartan-hydrochlorothiazide   Hx of HLD on pravastatin     Seen by Dr. Vazquez on 6/19/25 who states patient is cleared for surgery. Eliquis can be stopped as he has normal renal function 48 hours prior to the surgery to be restarted as soon as no contraindications after the surgery.     METS: 5.1  RCRI: 1 point, 6.0% risk for postoperative MACE   EMIGDIO: 1.3% risk for 30-day postoperative MACE  EKG - 6/19/25  Sinus bradycardia  Minimal voltage criteria for LVH, may be normal variant (no change from EKG from 6/3/25)    ECHO 5/27/22  1. The left ventricular systolic function is normal with a 60-65% estimated ejection fraction.   2. RVSP within normal limits.   3. There is mild to moderate tricuspid regurgitation.   4. There is moderate pulmonic valve regurgitation.   5. Compared with the prior exam from 7/29/2015 ([facility]) there are no significant changes except the trivial to small pericardial effusion is new.   6. The patient is in atrial fibrillation which may influence the estimate of left ventricular function and transvalvular flows.  7. There is normal right ventricular global systolic function.     (Last seen in PAT  clinic 6/03/25 with a repeat ECHO ordered, however patient did not get this done).  Patient does not remember needing to go to Echo on 6/06/25. Will order repeat Echo given bilateral LE swelling. Patient thinks may be caused by new combination pill but will send for an Echo and follow up with a PCP (will look for appointment before surgery date).    Pulmonary   #JEFFREY, not using CPAP   No hx of asthma or COPd; not a smoker     recent URI went to urgent care on 5/25/25, was prescribed Zithromax, solu-medrol and inhaler. Repeat CXR ordered on 6/03, patient did not have done. Sent today for repeat CXR and given script for albuterol inhaler to use 2 days prior to surgery and morning of. Will f/up with results.    No pulmonary diagnosis, however patient is at increased risk of perioperative complications secondary to  age > 60, obesity, respiratory infection within 4-6 weeks  Stop Bang score is 6 placing patient at high risk for JEFFREY  ARISCAT: 26-44 points, 13.3% risk of in-hospital postoperative pulmonary complication  PRODIGY: High risk for opioid-induced respiratory depression  Pumonary toilet education discussed, patient also provided deep breathing exercises and incentive spirometry educational handout    Renal   #BPH, Managed on Flomax (continue)   Hx of prostate cancer    No renal diagnosis, however patient is at increase risk for perioperative renal complications secondary to  Age equal to or greater than 56, HTN, use of an ace, arb, or NSAID    Endocrine   No endocrine diagnosis or significant findings on chart review or clinical presentation and evaluation. No further testing or intervention is indicated at this time.    Last A1C 5.8% (7/2/24)    Hematologic   Monoclonal Gammopathy of Undetermined Significance; follows with hematology and oncology  - 0.5g/dL M protein IgA Kappa  - Kappa FLC 2.39mg/dL, FLC ratio 2.25  - Spot UPEP showed 4.6% of total urine protein Kappa FLC  - Osseous survey negative   - Negative  "SLiM CRAB criteria    Caprini Score 8, patient at High risk for perioperative DVT.  Patient provided VTE education/handout.      Gastrointestinal   #GERD, OTC medications as needed     Eat-10 score 0  Apfel 2      Infectious Disease   #Hepatitis B&C - s/p Harvoni (2858-2110) with neg VL and stable since    Prescription provided for CHG body wash and dental rinse. CHG use instructions reviewed and provided to patient.  Musculoskeletal   No diagnosis or significant findings on chart review or clinical presentation and evaluation.       Anesthesia/Airway   No anesthesia complications    Labs ordered: T&S, BMP, Mg, CXR    Medication, NPO, and all other CPM instructions were reviewed with the patient.  All patient questions were addressed.    Patient seen and staffed with Dr. Roe           [1]   Past Medical History:  Diagnosis Date    A-fib (Multi)     on Eliquis, pre surgery stop instructions and clearance in 2/9/24 \"letters\"    Acute hepatitis C     Genotype 1a, s/p Harvoni [id number]    Anxiety     ASCVD (arteriosclerotic cardiovascular disease)     Benign prostatic hyperplasia with lower urinary tract symptoms     Chronic pain disorder     back    CKD (chronic kidney disease)     CKD (chronic kidney disease)     Coronary artery disease     Elevated PSA 12/21/2023    5.97    GERD (gastroesophageal reflux disease)     Hepatitis B     + antibody    Hyperaldosteronism     Hypercholesteremia     Hypertension     Irregular heart beat     Mitral valve insufficiency     Monoclonal gammopathy     MGUS followed by Hem/Onc Dr. Ochoa    JEFFREY (obstructive sleep apnea)     Prostate cancer (Multi)     RT and ADT [id number]    Tobacco use disorder    [2]   Past Surgical History:  Procedure Laterality Date    CARDIAC ELECTROPHYSIOLOGY PROCEDURE N/A 5/22/2025    Procedure: Cardioversion;  Surgeon: Mitch Vazquez MD;  Location: [place] [address] Cardiac Cath Lab;  Service: Electrophysiology;  Laterality: N/A;  CPT: 47850    CARDIOVERSION  " 12/2022    and Ablation for A Fib    COLONOSCOPY  08/26/2019    Colonoscopy    HERNIA REPAIR  11/15/2021    Hernia repair    PROSTATE BIOPSY  06/11/2014   [3]   Family History  Family history unknown: Yes   [4] No Known Allergies

## 2025-07-14 ENCOUNTER — OFFICE VISIT (OUTPATIENT)
Dept: PRIMARY CARE | Facility: CLINIC | Age: 75
End: 2025-07-14
Payer: MEDICARE

## 2025-07-14 VITALS
DIASTOLIC BLOOD PRESSURE: 82 MMHG | SYSTOLIC BLOOD PRESSURE: 130 MMHG | BODY MASS INDEX: 31.61 KG/M2 | OXYGEN SATURATION: 95 % | HEIGHT: 72 IN | WEIGHT: 233.4 LBS | HEART RATE: 52 BPM

## 2025-07-14 DIAGNOSIS — R60.9 EDEMA, UNSPECIFIED TYPE: ICD-10-CM

## 2025-07-14 DIAGNOSIS — Z00.00 MEDICARE ANNUAL WELLNESS VISIT, SUBSEQUENT: Primary | ICD-10-CM

## 2025-07-14 DIAGNOSIS — B18.2 CHRONIC HEPATITIS C WITHOUT HEPATIC COMA (MULTI): ICD-10-CM

## 2025-07-14 DIAGNOSIS — I48.92 ATRIAL FLUTTER, UNSPECIFIED TYPE (MULTI): ICD-10-CM

## 2025-07-14 DIAGNOSIS — I10 HYPERTENSION, UNSPECIFIED TYPE: ICD-10-CM

## 2025-07-14 DIAGNOSIS — I48.91 ATRIAL FIBRILLATION, UNSPECIFIED TYPE (MULTI): ICD-10-CM

## 2025-07-14 DIAGNOSIS — E66.811 CLASS 1 OBESITY WITHOUT SERIOUS COMORBIDITY WITH BODY MASS INDEX (BMI) OF 31.0 TO 31.9 IN ADULT, UNSPECIFIED OBESITY TYPE: ICD-10-CM

## 2025-07-14 DIAGNOSIS — Z13.31 DEPRESSION SCREENING: ICD-10-CM

## 2025-07-14 DIAGNOSIS — R73.9 HYPERGLYCEMIA: ICD-10-CM

## 2025-07-14 DIAGNOSIS — Z00.00 ROUTINE GENERAL MEDICAL EXAMINATION AT HEALTH CARE FACILITY: ICD-10-CM

## 2025-07-14 DIAGNOSIS — J34.89 NASAL CAVITY MASS: ICD-10-CM

## 2025-07-14 DIAGNOSIS — C61 ADENOCARCINOMA OF PROSTATE (MULTI): ICD-10-CM

## 2025-07-14 DIAGNOSIS — E78.5 HYPERLIPIDEMIA, UNSPECIFIED HYPERLIPIDEMIA TYPE: ICD-10-CM

## 2025-07-14 DIAGNOSIS — C61 PROSTATE CANCER (MULTI): ICD-10-CM

## 2025-07-14 PROCEDURE — 1159F MED LIST DOCD IN RCRD: CPT | Performed by: PHYSICIAN ASSISTANT

## 2025-07-14 PROCEDURE — 3075F SYST BP GE 130 - 139MM HG: CPT | Performed by: PHYSICIAN ASSISTANT

## 2025-07-14 PROCEDURE — 99213 OFFICE O/P EST LOW 20 MIN: CPT | Performed by: PHYSICIAN ASSISTANT

## 2025-07-14 PROCEDURE — G0444 DEPRESSION SCREEN ANNUAL: HCPCS | Performed by: PHYSICIAN ASSISTANT

## 2025-07-14 PROCEDURE — 1126F AMNT PAIN NOTED NONE PRSNT: CPT | Performed by: PHYSICIAN ASSISTANT

## 2025-07-14 PROCEDURE — 1036F TOBACCO NON-USER: CPT | Performed by: PHYSICIAN ASSISTANT

## 2025-07-14 PROCEDURE — 99397 PER PM REEVAL EST PAT 65+ YR: CPT | Mod: CSA | Performed by: PHYSICIAN ASSISTANT

## 2025-07-14 PROCEDURE — 3079F DIAST BP 80-89 MM HG: CPT | Performed by: PHYSICIAN ASSISTANT

## 2025-07-14 PROCEDURE — 3008F BODY MASS INDEX DOCD: CPT | Performed by: PHYSICIAN ASSISTANT

## 2025-07-14 PROCEDURE — 99213 OFFICE O/P EST LOW 20 MIN: CPT | Mod: 25 | Performed by: PHYSICIAN ASSISTANT

## 2025-07-14 PROCEDURE — 1160F RVW MEDS BY RX/DR IN RCRD: CPT | Performed by: PHYSICIAN ASSISTANT

## 2025-07-14 PROCEDURE — 1170F FXNL STATUS ASSESSED: CPT | Performed by: PHYSICIAN ASSISTANT

## 2025-07-14 PROCEDURE — 99397 PER PM REEVAL EST PAT 65+ YR: CPT | Performed by: PHYSICIAN ASSISTANT

## 2025-07-14 PROCEDURE — G0439 PPPS, SUBSEQ VISIT: HCPCS | Performed by: PHYSICIAN ASSISTANT

## 2025-07-14 PROCEDURE — 99215 OFFICE O/P EST HI 40 MIN: CPT | Performed by: PHYSICIAN ASSISTANT

## 2025-07-14 RX ORDER — METOPROLOL TARTRATE 25 MG/1
25 TABLET, FILM COATED ORAL 2 TIMES DAILY
Qty: 180 TABLET | Refills: 3 | Status: SHIPPED | OUTPATIENT
Start: 2025-07-14 | End: 2026-07-14

## 2025-07-14 RX ORDER — POTASSIUM CHLORIDE 20 MEQ/1
20 TABLET, EXTENDED RELEASE ORAL DAILY
Qty: 14 TABLET | Refills: 0 | Status: SHIPPED | OUTPATIENT
Start: 2025-07-14 | End: 2025-07-28

## 2025-07-14 RX ORDER — FUROSEMIDE 20 MG/1
20 TABLET ORAL DAILY
Qty: 14 TABLET | Refills: 0 | Status: SHIPPED | OUTPATIENT
Start: 2025-07-14 | End: 2025-07-28

## 2025-07-14 ASSESSMENT — ACTIVITIES OF DAILY LIVING (ADL)
DRESSING: INDEPENDENT
TAKING_MEDICATION: INDEPENDENT
BATHING: INDEPENDENT
MANAGING_FINANCES: INDEPENDENT
GROCERY_SHOPPING: INDEPENDENT
DOING_HOUSEWORK: INDEPENDENT

## 2025-07-14 ASSESSMENT — ENCOUNTER SYMPTOMS
DEPRESSION: 0
LOSS OF SENSATION IN FEET: 0
OCCASIONAL FEELINGS OF UNSTEADINESS: 0

## 2025-07-14 ASSESSMENT — PAIN SCALES - GENERAL: PAINLEVEL_OUTOF10: 0-NO PAIN

## 2025-07-14 NOTE — PROGRESS NOTES
Subjective   Reason for Visit: Christiano Masters is an 74 y.o. male here for a Medicare Wellness visit.     Past Medical, Surgical, and Family History reviewed and updated in chart.    Reviewed all medications by prescribing practitioner or clinical pharmacist (such as prescriptions, OTCs, herbal therapies and supplements) and documented in the medical record.    [facility]  Patient Care Team:  Srinivas Pierce PA-C as PCP - General (Internal Medicine)  JAYSHREE Lopez as PCP - [employer] PCP     Prostate cancer:  Had treatment about 10 years ago.  Following with oncology and urology.    BLE edema:  Symptoms x months.  Has tried compression socks which only helped a little.  Both sides equally.    Sinonasal mass:  Has upcoming procedure on 7/18/25 to remove this.    A-fib/HTN:  Following with cardiology.  Taking Amlodipine, Losartan-hydrochlorothiazide, Metoprolol.  BP today is 130/82.    Hep C:  Had treatment in the past.    HLD:  Taking Pravastatin.  Last checked July 2024.  DUE for re-check.    Hyperglycemia:  Glucose was 116 in July 2025.  DUE for A1C.    Health maintenance:  Smoking: Former smoker.  PSA (50+): Nov 2024  Labs: Nov 2024. DUE for lipid, A1C  Colonoscopy (50-75): 2023  Prevnar 13 (65+): 2015  Pneumovax 23 (65+, 1 year after Prev 13): 2019  Influenza:  Shingrix (2 doses, 2-6 months apart):    Review of Systems  12 point review of systems negative unless stated above in HPI    Objective   Vitals:  /82   Pulse 52   Ht 1.829 m (6')   Wt 106 kg (233 lb 6.4 oz)   SpO2 95%   BMI 31.65 kg/m²       Physical Exam  General: Alert and oriented, well nourished, no acute distress.  Lungs: Clear to auscultation, non-labored respiration.  Heart: Normal rate, regular rhythm, no murmur, gallop or edema.  Neurologic: Awake, alert, and oriented X3, CN II-XII intact.  Psychiatric: Cooperative, appropriate mood and affect.  Skin: BLE with moderate edema, no rashes.  Assessment & Plan  Medicare  annual wellness visit, subsequent  It was good seeing you!  This counts as your annual Medicare Wellness visit.  Health maintenance was reviewed today.  Depression screening is negative (5 -15 min screening was completed).  I have ordered some labs to be done as soon as you can.  We will call you with the results.  Good luck with your surgery!  I have sent in a short course of Lasix to take with potassium.  Consider an echo.  Continue specialty care.  Continue the same medications.  Chronic conditions are stable.  Call with questions or concerns.    Follow up  1 month       Depression screening         Atrial fibrillation, unspecified type (Multi)    Orders:    metoprolol tartrate (Lopressor) 25 mg tablet; Take 1 tablet (25 mg) by mouth 2 times a day.    Hypertension, unspecified type    Orders:    metoprolol tartrate (Lopressor) 25 mg tablet; Take 1 tablet (25 mg) by mouth 2 times a day.    Prostate cancer (Multi)         Hyperlipidemia, unspecified hyperlipidemia type    Orders:    Lipid Panel; Future    Chronic hepatitis C without hepatic coma (Multi)         Adenocarcinoma of prostate (Multi)         Nasal cavity mass         Hyperglycemia    Orders:    Hemoglobin A1C; Future    BMI 31.0-31.9,adult         Class 1 obesity without serious comorbidity with body mass index (BMI) of 31.0 to 31.9 in adult, unspecified obesity type         Routine general medical examination at health care facility    Orders:    1 Year Follow Up In Primary Care - Wellness Exam; Future    Atrial flutter, unspecified type (Multi)         Edema, unspecified type    Orders:    furosemide (Lasix) 20 mg tablet; Take 1 tablet (20 mg) by mouth once daily for 14 days.    potassium chloride CR (Klor-Con M20) 20 mEq ER tablet; Take 1 tablet (20 mEq) by mouth once daily for 14 days. Do not crush or chew.

## 2025-07-15 ENCOUNTER — ANCILLARY PROCEDURE (OUTPATIENT)
Dept: CARDIOLOGY | Facility: CLINIC | Age: 75
End: 2025-07-15
Payer: MEDICARE

## 2025-07-15 DIAGNOSIS — Z86.79 HISTORY OF CARDIOVASCULAR DISORDER: ICD-10-CM

## 2025-07-15 DIAGNOSIS — R60.0 LOWER EXTREMITY EDEMA: ICD-10-CM

## 2025-07-15 PROCEDURE — 93306 TTE W/DOPPLER COMPLETE: CPT | Performed by: STUDENT IN AN ORGANIZED HEALTH CARE EDUCATION/TRAINING PROGRAM

## 2025-07-15 PROCEDURE — 93306 TTE W/DOPPLER COMPLETE: CPT

## 2025-07-16 ENCOUNTER — APPOINTMENT (OUTPATIENT)
Dept: PRIMARY CARE | Facility: CLINIC | Age: 75
End: 2025-07-16
Payer: MEDICARE

## 2025-07-16 DIAGNOSIS — G47.30 SLEEP APNEA, UNSPECIFIED TYPE: ICD-10-CM

## 2025-07-16 DIAGNOSIS — E78.5 HYPERLIPIDEMIA, UNSPECIFIED HYPERLIPIDEMIA TYPE: ICD-10-CM

## 2025-07-16 DIAGNOSIS — C61 PROSTATE CANCER (MULTI): ICD-10-CM

## 2025-07-16 DIAGNOSIS — J34.89 NASAL CAVITY MASS: Primary | ICD-10-CM

## 2025-07-16 DIAGNOSIS — I10 HYPERTENSION, UNSPECIFIED TYPE: ICD-10-CM

## 2025-07-16 DIAGNOSIS — Z01.818 PRE-OP EXAM: ICD-10-CM

## 2025-07-16 DIAGNOSIS — B18.2 CHRONIC HEPATITIS C WITHOUT HEPATIC COMA (MULTI): ICD-10-CM

## 2025-07-16 DIAGNOSIS — I48.91 ATRIAL FIBRILLATION, UNSPECIFIED TYPE (MULTI): ICD-10-CM

## 2025-07-16 LAB
AORTIC VALVE MEAN GRADIENT: 4 MMHG
AORTIC VALVE PEAK VELOCITY: 1.55 M/S
AV PEAK GRADIENT: 10 MMHG
AVA (PEAK VEL): 2.4 CM2
AVA (VTI): 2.4 CM2
EJECTION FRACTION APICAL 4 CHAMBER: 63.6
EJECTION FRACTION: 64 %
LEFT ATRIUM VOLUME AREA LENGTH INDEX BSA: 37.8 ML/M2
LEFT VENTRICLE INTERNAL DIMENSION DIASTOLE: 5.34 CM (ref 3.5–6)
LEFT VENTRICULAR OUTFLOW TRACT DIAMETER: 2.12 CM
MITRAL VALVE E/A RATIO: 0.96
RIGHT VENTRICLE FREE WALL PEAK S': 16 CM/S
RIGHT VENTRICLE PEAK SYSTOLIC PRESSURE: 35 MMHG
TRICUSPID ANNULAR PLANE SYSTOLIC EXCURSION: 2.4 CM

## 2025-07-18 ENCOUNTER — HOSPITAL ENCOUNTER (OUTPATIENT)
Facility: HOSPITAL | Age: 75
Setting detail: OUTPATIENT SURGERY
Discharge: HOME | End: 2025-07-18
Attending: OTOLARYNGOLOGY | Admitting: OTOLARYNGOLOGY
Payer: MEDICARE

## 2025-07-18 ENCOUNTER — ANESTHESIA (OUTPATIENT)
Dept: OPERATING ROOM | Facility: HOSPITAL | Age: 75
End: 2025-07-18
Payer: MEDICARE

## 2025-07-18 VITALS
RESPIRATION RATE: 11 BRPM | DIASTOLIC BLOOD PRESSURE: 76 MMHG | OXYGEN SATURATION: 100 % | TEMPERATURE: 97 F | SYSTOLIC BLOOD PRESSURE: 139 MMHG | HEART RATE: 59 BPM

## 2025-07-18 DIAGNOSIS — D14.0 INVERTED PAPILLOMA OF MAXILLARY SINUS: ICD-10-CM

## 2025-07-18 DIAGNOSIS — C31.0 SQUAMOUS CELL CARCINOMA OF MAXILLARY SINUS: Primary | ICD-10-CM

## 2025-07-18 PROCEDURE — 7100000002 HC RECOVERY ROOM TIME - EACH INCREMENTAL 1 MINUTE: Performed by: OTOLARYNGOLOGY

## 2025-07-18 PROCEDURE — 7100000010 HC PHASE TWO TIME - EACH INCREMENTAL 1 MINUTE: Performed by: OTOLARYNGOLOGY

## 2025-07-18 PROCEDURE — 31225 REMOVAL OF UPPER JAW: CPT | Performed by: OTOLARYNGOLOGY

## 2025-07-18 PROCEDURE — 7100000009 HC PHASE TWO TIME - INITIAL BASE CHARGE: Performed by: OTOLARYNGOLOGY

## 2025-07-18 PROCEDURE — 61782 SCAN PROC CRANIAL EXTRA: CPT | Performed by: OTOLARYNGOLOGY

## 2025-07-18 PROCEDURE — 3600000017 HC OR TIME - EACH INCREMENTAL 1 MINUTE - PROCEDURE LEVEL SIX: Performed by: OTOLARYNGOLOGY

## 2025-07-18 PROCEDURE — 2500000004 HC RX 250 GENERAL PHARMACY W/ HCPCS (ALT 636 FOR OP/ED)

## 2025-07-18 PROCEDURE — 2500000004 HC RX 250 GENERAL PHARMACY W/ HCPCS (ALT 636 FOR OP/ED): Mod: JZ,TB

## 2025-07-18 PROCEDURE — 2500000001 HC RX 250 WO HCPCS SELF ADMINISTERED DRUGS (ALT 637 FOR MEDICARE OP)

## 2025-07-18 PROCEDURE — 7100000001 HC RECOVERY ROOM TIME - INITIAL BASE CHARGE: Performed by: OTOLARYNGOLOGY

## 2025-07-18 PROCEDURE — 3600000018 HC OR TIME - INITIAL BASE CHARGE - PROCEDURE LEVEL SIX: Performed by: OTOLARYNGOLOGY

## 2025-07-18 PROCEDURE — 2500000004 HC RX 250 GENERAL PHARMACY W/ HCPCS (ALT 636 FOR OP/ED): Performed by: OTOLARYNGOLOGY

## 2025-07-18 PROCEDURE — 3700000001 HC GENERAL ANESTHESIA TIME - INITIAL BASE CHARGE: Performed by: OTOLARYNGOLOGY

## 2025-07-18 PROCEDURE — 2500000001 HC RX 250 WO HCPCS SELF ADMINISTERED DRUGS (ALT 637 FOR MEDICARE OP): Performed by: OTOLARYNGOLOGY

## 2025-07-18 PROCEDURE — 3700000002 HC GENERAL ANESTHESIA TIME - EACH INCREMENTAL 1 MINUTE: Performed by: OTOLARYNGOLOGY

## 2025-07-18 PROCEDURE — 2720000007 HC OR 272 NO HCPCS: Performed by: OTOLARYNGOLOGY

## 2025-07-18 PROCEDURE — 2500000005 HC RX 250 GENERAL PHARMACY W/O HCPCS: Performed by: OTOLARYNGOLOGY

## 2025-07-18 RX ORDER — ROCURONIUM BROMIDE 10 MG/ML
INJECTION, SOLUTION INTRAVENOUS AS NEEDED
Status: DISCONTINUED | OUTPATIENT
Start: 2025-07-18 | End: 2025-07-18

## 2025-07-18 RX ORDER — SODIUM CHLORIDE, SODIUM LACTATE, POTASSIUM CHLORIDE, CALCIUM CHLORIDE 600; 310; 30; 20 MG/100ML; MG/100ML; MG/100ML; MG/100ML
100 INJECTION, SOLUTION INTRAVENOUS CONTINUOUS
Status: DISCONTINUED | OUTPATIENT
Start: 2025-07-18 | End: 2025-07-18 | Stop reason: HOSPADM

## 2025-07-18 RX ORDER — CEFAZOLIN 1 G/1
INJECTION, POWDER, FOR SOLUTION INTRAVENOUS AS NEEDED
Status: DISCONTINUED | OUTPATIENT
Start: 2025-07-18 | End: 2025-07-18

## 2025-07-18 RX ORDER — SODIUM CHLORIDE 0.9 G/100ML
INJECTION, SOLUTION IRRIGATION AS NEEDED
Status: DISCONTINUED | OUTPATIENT
Start: 2025-07-18 | End: 2025-07-18 | Stop reason: HOSPADM

## 2025-07-18 RX ORDER — KETOROLAC TROMETHAMINE 30 MG/ML
INJECTION, SOLUTION INTRAMUSCULAR; INTRAVENOUS AS NEEDED
Status: DISCONTINUED | OUTPATIENT
Start: 2025-07-18 | End: 2025-07-18

## 2025-07-18 RX ORDER — LIDOCAINE HCL/PF 100 MG/5ML
SYRINGE (ML) INTRAVENOUS AS NEEDED
Status: DISCONTINUED | OUTPATIENT
Start: 2025-07-18 | End: 2025-07-18

## 2025-07-18 RX ORDER — FENTANYL CITRATE 50 UG/ML
INJECTION, SOLUTION INTRAMUSCULAR; INTRAVENOUS AS NEEDED
Status: DISCONTINUED | OUTPATIENT
Start: 2025-07-18 | End: 2025-07-18

## 2025-07-18 RX ORDER — ACETAMINOPHEN 10 MG/ML
INJECTION, SOLUTION INTRAVENOUS AS NEEDED
Status: DISCONTINUED | OUTPATIENT
Start: 2025-07-18 | End: 2025-07-18

## 2025-07-18 RX ORDER — GLYCOPYRROLATE 0.6MG/3ML
SYRINGE (ML) INTRAVENOUS AS NEEDED
Status: DISCONTINUED | OUTPATIENT
Start: 2025-07-18 | End: 2025-07-18

## 2025-07-18 RX ORDER — SODIUM CHLORIDE 0.65 %
2 AEROSOL, SPRAY (ML) NASAL EVERY 4 HOURS
Qty: 44 ML | Refills: 1 | Status: SHIPPED | OUTPATIENT
Start: 2025-07-18 | End: 2025-08-02

## 2025-07-18 RX ORDER — LABETALOL HYDROCHLORIDE 5 MG/ML
5 INJECTION, SOLUTION INTRAVENOUS ONCE AS NEEDED
Status: DISCONTINUED | OUTPATIENT
Start: 2025-07-18 | End: 2025-07-18 | Stop reason: HOSPADM

## 2025-07-18 RX ORDER — ONDANSETRON HYDROCHLORIDE 2 MG/ML
INJECTION, SOLUTION INTRAVENOUS AS NEEDED
Status: DISCONTINUED | OUTPATIENT
Start: 2025-07-18 | End: 2025-07-18

## 2025-07-18 RX ORDER — ACETAMINOPHEN 500 MG
1000 TABLET ORAL EVERY 8 HOURS PRN
Qty: 90 TABLET | Refills: 0 | Status: SHIPPED | OUTPATIENT
Start: 2025-07-18 | End: 2025-08-02

## 2025-07-18 RX ORDER — OXYCODONE HYDROCHLORIDE 5 MG/1
10 TABLET ORAL EVERY 4 HOURS PRN
Status: DISCONTINUED | OUTPATIENT
Start: 2025-07-18 | End: 2025-07-18 | Stop reason: HOSPADM

## 2025-07-18 RX ORDER — AMOXICILLIN AND CLAVULANATE POTASSIUM 875; 125 MG/1; MG/1
1 TABLET, FILM COATED ORAL 2 TIMES DAILY
Qty: 14 TABLET | Refills: 0 | Status: SHIPPED | OUTPATIENT
Start: 2025-07-18 | End: 2025-07-25

## 2025-07-18 RX ORDER — WATER 1 ML/ML
INJECTION IRRIGATION AS NEEDED
Status: DISCONTINUED | OUTPATIENT
Start: 2025-07-18 | End: 2025-07-18 | Stop reason: HOSPADM

## 2025-07-18 RX ORDER — REMIFENTANIL HYDROCHLORIDE 1 MG/ML
INJECTION, POWDER, LYOPHILIZED, FOR SOLUTION INTRAVENOUS AS NEEDED
Status: DISCONTINUED | OUTPATIENT
Start: 2025-07-18 | End: 2025-07-18

## 2025-07-18 RX ORDER — HYDROMORPHONE HYDROCHLORIDE 0.2 MG/ML
0.2 INJECTION INTRAMUSCULAR; INTRAVENOUS; SUBCUTANEOUS EVERY 5 MIN PRN
Status: DISCONTINUED | OUTPATIENT
Start: 2025-07-18 | End: 2025-07-18 | Stop reason: HOSPADM

## 2025-07-18 RX ORDER — TRAMADOL HYDROCHLORIDE 50 MG/1
50 TABLET, FILM COATED ORAL EVERY 8 HOURS PRN
Qty: 10 TABLET | Refills: 0 | Status: SHIPPED | OUTPATIENT
Start: 2025-07-18

## 2025-07-18 RX ORDER — PROPOFOL 10 MG/ML
INJECTION, EMULSION INTRAVENOUS AS NEEDED
Status: DISCONTINUED | OUTPATIENT
Start: 2025-07-18 | End: 2025-07-18

## 2025-07-18 RX ORDER — CHLORHEXIDINE GLUCONATE ORAL RINSE 1.2 MG/ML
15 SOLUTION DENTAL 3 TIMES DAILY
Qty: 450 ML | Refills: 0 | Status: SHIPPED | OUTPATIENT
Start: 2025-07-18 | End: 2025-07-28

## 2025-07-18 RX ORDER — OXYMETAZOLINE HCL 0.05 %
SPRAY, NON-AEROSOL (ML) NASAL AS NEEDED
Status: DISCONTINUED | OUTPATIENT
Start: 2025-07-18 | End: 2025-07-18 | Stop reason: HOSPADM

## 2025-07-18 RX ORDER — IBUPROFEN 400 MG/1
400 TABLET, FILM COATED ORAL EVERY 6 HOURS PRN
Qty: 40 TABLET | Refills: 0 | Status: SHIPPED | OUTPATIENT
Start: 2025-07-18 | End: 2025-07-28

## 2025-07-18 RX ORDER — OXYCODONE HYDROCHLORIDE 5 MG/1
5 TABLET ORAL EVERY 4 HOURS PRN
Status: DISCONTINUED | OUTPATIENT
Start: 2025-07-18 | End: 2025-07-18 | Stop reason: HOSPADM

## 2025-07-18 RX ORDER — LIDOCAINE HYDROCHLORIDE AND EPINEPHRINE 10; 10 UG/ML; MG/ML
INJECTION, SOLUTION INFILTRATION; PERINEURAL AS NEEDED
Status: DISCONTINUED | OUTPATIENT
Start: 2025-07-18 | End: 2025-07-18 | Stop reason: HOSPADM

## 2025-07-18 RX ORDER — ONDANSETRON HYDROCHLORIDE 2 MG/ML
4 INJECTION, SOLUTION INTRAVENOUS ONCE AS NEEDED
Status: DISCONTINUED | OUTPATIENT
Start: 2025-07-18 | End: 2025-07-18 | Stop reason: HOSPADM

## 2025-07-18 RX ORDER — OXYMETAZOLINE HCL 0.05 %
2 SPRAY, NON-AEROSOL (ML) NASAL EVERY 12 HOURS PRN
Qty: 15 ML | Refills: 0 | Status: SHIPPED | OUTPATIENT
Start: 2025-07-18 | End: 2025-07-21

## 2025-07-18 RX ADMIN — DEXAMETHASONE SODIUM PHOSPHATE 8 MG: 4 INJECTION INTRA-ARTICULAR; INTRALESIONAL; INTRAMUSCULAR; INTRAVENOUS; SOFT TISSUE at 11:28

## 2025-07-18 RX ADMIN — REMIFENTANIL HYDROCHLORIDE 40 MCG: 1 INJECTION, POWDER, LYOPHILIZED, FOR SOLUTION INTRAVENOUS at 12:01

## 2025-07-18 RX ADMIN — HYDROMORPHONE HYDROCHLORIDE 0.5 MG: 1 INJECTION, SOLUTION INTRAMUSCULAR; INTRAVENOUS; SUBCUTANEOUS at 14:07

## 2025-07-18 RX ADMIN — OXYCODONE HYDROCHLORIDE 10 MG: 5 TABLET ORAL at 15:17

## 2025-07-18 RX ADMIN — HYDROMORPHONE HYDROCHLORIDE 0.5 MG: 1 INJECTION, SOLUTION INTRAMUSCULAR; INTRAVENOUS; SUBCUTANEOUS at 14:32

## 2025-07-18 RX ADMIN — KETOROLAC TROMETHAMINE 30 MG: 30 INJECTION, SOLUTION INTRAMUSCULAR; INTRAVENOUS at 14:03

## 2025-07-18 RX ADMIN — PROPOFOL 200 MG: 10 INJECTION, EMULSION INTRAVENOUS at 11:15

## 2025-07-18 RX ADMIN — ROCURONIUM BROMIDE 50 MG: 10 INJECTION INTRAVENOUS at 11:15

## 2025-07-18 RX ADMIN — SODIUM CHLORIDE, POTASSIUM CHLORIDE, SODIUM LACTATE AND CALCIUM CHLORIDE 100 ML/HR: 600; 310; 30; 20 INJECTION, SOLUTION INTRAVENOUS at 13:53

## 2025-07-18 RX ADMIN — FENTANYL CITRATE 50 MCG: 50 INJECTION, SOLUTION INTRAMUSCULAR; INTRAVENOUS at 11:21

## 2025-07-18 RX ADMIN — SUGAMMADEX 400 MG: 100 INJECTION, SOLUTION INTRAVENOUS at 13:40

## 2025-07-18 RX ADMIN — HYDROMORPHONE HYDROCHLORIDE 0.5 MG: 1 INJECTION, SOLUTION INTRAMUSCULAR; INTRAVENOUS; SUBCUTANEOUS at 14:20

## 2025-07-18 RX ADMIN — LIDOCAINE HYDROCHLORIDE 100 MG: 20 INJECTION, SOLUTION INTRAVENOUS at 11:15

## 2025-07-18 RX ADMIN — FENTANYL CITRATE 50 MCG: 50 INJECTION, SOLUTION INTRAMUSCULAR; INTRAVENOUS at 11:15

## 2025-07-18 RX ADMIN — Medication 0.2 MG: at 11:21

## 2025-07-18 RX ADMIN — ONDANSETRON 4 MG: 2 INJECTION, SOLUTION INTRAMUSCULAR; INTRAVENOUS at 13:29

## 2025-07-18 RX ADMIN — SODIUM CHLORIDE, SODIUM LACTATE, POTASSIUM CHLORIDE, AND CALCIUM CHLORIDE: 600; 310; 30; 20 INJECTION, SOLUTION INTRAVENOUS at 10:59

## 2025-07-18 RX ADMIN — ACETAMINOPHEN 1000 MG: 10 INJECTION, SOLUTION INTRAVENOUS at 13:29

## 2025-07-18 RX ADMIN — REMIFENTANIL HYDROCHLORIDE 0.05 MCG/KG/MIN: 1 INJECTION, POWDER, LYOPHILIZED, FOR SOLUTION INTRAVENOUS at 11:26

## 2025-07-18 RX ADMIN — HYDROMORPHONE HYDROCHLORIDE 0.5 MG: 1 INJECTION, SOLUTION INTRAMUSCULAR; INTRAVENOUS; SUBCUTANEOUS at 14:15

## 2025-07-18 RX ADMIN — CEFAZOLIN 2 G: 1 INJECTION, POWDER, FOR SOLUTION INTRAMUSCULAR; INTRAVENOUS at 11:27

## 2025-07-18 RX ADMIN — ROCURONIUM BROMIDE 10 MG: 10 INJECTION INTRAVENOUS at 12:10

## 2025-07-18 ASSESSMENT — PAIN SCALES - GENERAL
PAINLEVEL_OUTOF10: 7
PAINLEVEL_OUTOF10: 0 - NO PAIN
PAINLEVEL_OUTOF10: 9
PAINLEVEL_OUTOF10: 7
PAINLEVEL_OUTOF10: 8
PAINLEVEL_OUTOF10: 9
PAINLEVEL_OUTOF10: 9
PAINLEVEL_OUTOF10: 8
PAINLEVEL_OUTOF10: 7
PAINLEVEL_OUTOF10: 8
PAINLEVEL_OUTOF10: 8

## 2025-07-18 ASSESSMENT — PAIN - FUNCTIONAL ASSESSMENT
PAIN_FUNCTIONAL_ASSESSMENT: 0-10

## 2025-07-18 ASSESSMENT — PAIN DESCRIPTION - LOCATION
LOCATION: NOSE
LOCATION: NECK
LOCATION: NOSE
LOCATION: NOSE

## 2025-07-18 NOTE — PERIOPERATIVE NURSING NOTE
1146 Pt arrived to PACU, alert and able to answer questions and follow commands. Pt shows no signs of distress. Will continue to monitor.       Jody Jones RN

## 2025-07-18 NOTE — ANESTHESIA PROCEDURE NOTES
Peripheral IV  Date/Time: 7/18/2025 11:29 AM      Placement  Needle size: 20 G  Laterality: right  Location: hand  Local anesthetic: none  Site prep: chlorhexidine  Technique: anatomical landmarks  Attempts: 2

## 2025-07-18 NOTE — ANESTHESIA PROCEDURE NOTES
Airway  Date/Time: 7/18/2025 11:18 AM  Reason: elective    Airway not difficult    Staffing  Performed: resident   Authorized by: Fabian Broussard MD    Performed by: Marty Gomes MD  Patient location during procedure: OR    Patient Condition  Indications for airway management: anesthesia  Patient position: sniffing  MILS not maintained throughout  Planned trial extubation  Sedation level: deep     Final Airway Details   Preoxygenated: yes  Final airway type: endotracheal airway  Successful airway: ETT  Cuffed: yes   Successful intubation technique: video laryngoscopy (Ashley)  Adjuncts used in placement: intubating stylet  Endotracheal tube insertion site: oral  Blade size: #3  ETT size (mm): 7.5  Cormack-Lehane Classification: grade I - full view of glottis  Placement verified by: chest auscultation and capnometry   Inital cuff pressure (cm H2O): 10  Measured from: lips  ETT to lips (cm): 21  Number of attempts at approach: 1  Number of other approaches attempted: 0

## 2025-07-18 NOTE — OP NOTE
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]     Procedures    Endoscopic and open resection of right maxillary sinus inverted papilloma via maxillectomy without orbital exenteration  Stereotactic CT guided navigation (Extradural)  Surgeons      * Maral Francisco V - Primary    Resident/Fellow/Other Assistant:  Surgeons and Role:     * Yvan Rivas MD - Resident - Assisting    Staff:   Circulator: Virginia  Circulator: Desire  Scrub Person: Adriana Ann Scrub: Lisa  Relief Circulator: Lisa    Anesthesia Staff: Anesthesiologist: Fabian Broussard MD  Anesthesia Resident: Marty Gomes MD    Procedure Summary  Anesthesia: General  ASA: III  Estimated Blood Loss: 100mL  Intra-op Medications:   Administrations occurring from 0920 to 1350 on 25:   Medication Name Total Dose   lidocaine-epinephrine (Xylocaine W/EPI) 1 %-1:100,000 injection 3 mL   sodium chloride 0.9 % irrigation solution 1,250 mL   oxymetazoline (Afrin) 0.05 % nasal spray 30 mL   acetaminophen (Ofirmev) injection 1,000 mg   ceFAZolin (Ancef) vial 1 g 2 g   dexAMETHasone (Decadron) 4 mg/mL IV Syringe 2 mL 8 mg   fentaNYL (Sublimaze) injection 50 mcg/mL 100 mcg   glycopyrrolate (Robinul) 0.2 mg/mL injection SYRINGE 0.2 mg   LR bolus Cannot be calculated   lidocaine PF (cardiac) syringe 2% 100 mg   ondansetron 2 mg/mL 4 mg   propofol (Diprivan) injection 10 mg/mL 200 mg   remifentanil (Ultiva) 1,000 mcg in sodium chloride 0.9% 50 mL (20 mcg/mL) infusion 0.66 mg   remifentanil (Ultiva) injection 40 mcg   rocuronium (ZeMuron) 50 mg/5  mL injection 60 mg   sugammadex (Bridion) 200 mg/2 mL injection 400 mg              Anesthesia Record               Intraprocedure I/O Totals          Intake    LR bolus 700.00 mL    Remifentanil Drip 0.00 mL    The total shown is the total volume documented since Anesthesia Start was filed.    Total Intake 700 mL       Output    Est. Blood Loss 25 mL    Total Output 25 mL       Net    Net Volume 675 mL          Specimen:   ID Type Source Tests Collected by Time   1 : RIGHT NASAL MASS Tissue NASAL MASS RIGHT RESECTION SURGICAL PATHOLOGY EXAM Maral MARTINEZ MD 7/18/2025 1211   2 : RIGHT ANTERIOR MAXILLARY SINUS WALL Tissue SINUS MASS RIGHT SURGICAL PATHOLOGY EXAM Maral MARTINEZ MD 7/18/2025 1308   3 : ANTERIOR WALL MUCOSA Tissue SINUS CONTENTS RIGHT SURGICAL PATHOLOGY EXAM Maral MARTINEZ MD 7/18/2025 1322   4 : RIGHT MAXILLARY SINUS, LATERAL MARGIN Tissue SINUS MASS RIGHT SURGICAL PATHOLOGY EXAM Maral MARTINEZ MD 7/18/2025 1326   5 : RIGHT SINUS CONTENTS Tissue SINUS CONTENTS RIGHT SURGICAL PATHOLOGY EXAM Maral MARTINEZ MD 7/18/2025 1333                 Drains and/or Catheters: * None in log *    Findings: Extensive tumor throughout the maxillary sinus, middle meatus, and extending into the nasopharynx. Completely free of all opposing sites EXCEPT the anterior wall of the maxillary sinus which was resected en bloc via open maxillectomy approach    Indications: Christiano Masters is an 74 y.o. 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     The patient was seen in the preoperative area. The risks, benefits, complications, treatment options, non-operative alternatives, expected recovery and outcomes were discussed with the patient. The possibilities of reaction to medication, pulmonary aspiration, injury to surrounding structures, bleeding, recurrent infection, the need for additional procedures,  failure to diagnose a condition, and creating a complication requiring transfusion or operation were discussed with the patient. The patient concurred with the proposed plan, giving informed consent.  The site of surgery was properly noted/marked if necessary per policy. The patient has been actively warmed in preoperative area. Preoperative antibiotics have been ordered and given within 1 hours of incision. Venous thrombosis prophylaxis have been ordered including bilateral sequential compression devices    Procedure Details:     To begin we utilized a 0 degree telescope to visualize the patient's bilateral sinonasal cavity.  On the left side there were no abnormalities.  On the right side there was a large papillomatous mass extending from the medial maxillary wall and middle meatus and nasopharynx.  This was consistent with the known inverted papilloma with squamous cell carcinoma fragments previously noted on examination and biopsy.  We began by meticulously inspecting the mass and noting any point of attachment.  It was not attached to the inferior turbinate, nasal floor, septum, middle turbinate, bulla ethmoidalis, or other portions of the middle meatus.  The mass was freely mobile and appeared to be tethered from within the maxillary sinus.  We proceeded to use the powered microdebrider to begin resecting the mass taking care to not damage any sites which the tumor was contacting.  We are able to follow the mass through meticulous dissection into the maxillary sinus where we resected a significant portion.  It was not adhered to the posterior lateral maxillary sinus walls and was clear of the orbital floor as well.  In order to obtain better visualization and address the more anterior portions of the mass we needed to perform a medial maxillectomy and sectioned the nasolacrimal duct at its entrance into the lacrimal sac.  This was divided sharply.  We then removed a significant portion of the anterior medial  maxillary wall.  This gave us freer access into the maxillary sinus for additional resection of the mass.  As we meticulously performed our resection we discovered that the mass appeared to be tethered to the anterior maxillary wall just out of her view and it was unable to be reliably instrumented.  We therefore elected to convert to an open approach.  We exposed the gingivobuccal sulcus and infiltrated the gingival mucosa with 1% lidocaine with 1 100,000 parts epinephrine.  Taking care to preserve an adequate cuff for closure we made a 3 cm sublabial incision directly onto the periosteum of the bone.  We divided the periosteum and then elevated the periosteum and soft tissue envelope off of the anterior maxilla.  We confirmed the position of the maxillary sinus with our image guidance system and drilled a hole through the anterior maxillary wall.  We enlarge it with 1 Kerrison.  We discovered that the initial access point S into the inferior meatus rather than to the maxillary sinus so this necessitated creating an additional opening into the maxillary sinus.  After removing a small portion of the anterior maxillary wall we were able to visualize the mass.  It appeared to be tethered to the anterior maxillary wall but appeared to be free of the surrounding mucosa.  We performed osteotomies in order to free the involved maxilla from its bony and soft tissue attachments.  We removed the attachment point of the tumor en bloc from the maxilla.  We then took lateral and medial margins from the mucosa and sent them for permanent pathologic analysis.  The main specimen was also sent for permanent pathologic analysis.  We thoroughly irrigated the nose and the maxillary sinus with normal saline.  We carefully examined the patient's nose and mouth for bleeding points.  Finding none we proceeded with closure of our sublabial incision.  Several horizontal mattress sutures were placed with 3-0 Vicryl sutures and this helped us  to completely reapproximate or sublabial incision.  This completed the procedure.  The patient was smoothly extubated and taken to the PACU.  All counts are correct x 2 and I was present and actively participated in the entire procedure.  Evidence of Infection: No   Complications:  None; patient tolerated the procedure well.    Disposition: PACU - hemodynamically stable.  Condition: stable               Attending Attestation: I was present and scrubbed for the entire procedure.    Maral Francisco V  Phone Number: [phone]

## 2025-07-18 NOTE — DISCHARGE INSTRUCTIONS
Maral Francisco MD, M.Eng.  [facility]  Department of Otolaryngology-Head & Neck Surgery  Division of Rhinology and Endoscopic Skull Base Surgery  [email or web address]    PHONE NUMBERS:    Emergency: Call 911     Urgent Issues/After Hours: Call [phone] and ask to speak to the ENT resident on-call for Otolaryngology/Dr. Francisco    For regular, routine issues, feel free to call us at the office    WHAT TO DO     You need to follow-up with me at my outpatient clinic on the date provided to you.    Please call us to change date or time. Please keep in mind that the timing of your post-operative visit may affect the success of your surgery.    Call ASAP if you are experiencing any unexpected problems.    Familiarize yourself with these instructions. These instructions should replace any instructions given to you by the hospital. If you have questions, please call me.    Use the recommended medications and treatments as described.    Do not blow your nose until I say it is OK.     Call me with questions.  Also, please call me the day after surgery to let me know how you are doing.    WHAT TO EXPECT    Nasal Discharge & Bleeding  It is common to have mild bleeding and nasal drainage after nasal & sinus surgery.  After surgery, a “drip pad” may have been placed under your nose.  You may remove this at any point and can replace it if necessary, at your discretion. You may use Afrin/oxymetazoline spray (available over the counter at your pharmacy) to help alleviate bleeding.     Pain & Pressure  It is common to experience mild-to-moderate pain and pressure in your face and around your eyes. The pain usually is worst the first day after surgery but can continue for several days. Use your pain medications as described below. For any severe pain uncontrolled by medications, please contact the office or present to the emergency room.    Fatigue  It is common to  feel mild to moderate fatigue for 7-10 days after surgery.    Nasal Congestion and Crusting    It is common to experience worsened nasal congestion after surgery.  This is due to swelling and crusts.  Crusts are essentially scabs and mucus that build up in the nasal passages after surgery. Crusts eventually resolve.  Usually, I will remove some crusts during your postoperative visit.    Nasal irrigation helps to soften and remove scabs. We will tell you on the day of surgery when you can start irrigation    Nasal irrigation kits are available over the counter in the nasal section.  Common brands include SinuCleanse or NeilMed.    ACTIVITY     First 1-2 days after surgery: Plan to rest, but you may start light activities as tolerated.  Days 2 through 10 following surgery:  Light activity only until 10 days after surgery, gradually increase activity.    No extensive bending over for 7 days after surgery.    No lifting over 20 pounds for one week after surgery. No aerobic exercise until I clear you to do so.    You may shower starting 1 day after surgery    Call my office if you experience any of the following:    · Fever higher than 101 F  · Clear, watery nasal discharge  · Any visual changes or marked swelling around the eyes  · Severe headache or neck stiffness  · Brisk bleeding    NUTRITION     Day of surgery    Drink plenty of water / fluids  Eat light snacks as tolerated  It is common for people to have less of an appetite the day of surgery  Day after surgery: Advance your diet as tolerated    MEDICATION SAFETY TIPS    Use medications only as directed in the amounts specified  Avoid aspirin for 10 days.  Avoid fish oil (omega-3/6) and vitamin E for 1 week.  As your pain lessens, you may replace doses of prescription pain medications with acetaminophen (Tylenol).  However, if prescribed Lortab or Percocet (containing acetaminophen), Do Not take doses of prescription pain medications at the same time as Tylenol  because this could cause an overdose of Tylenol.  Do Not drive or operate machinery if taking prescription pain medications  Read each medication label carefully, ask your pharmacist if you have any questions regarding warnings on the label  Do not measure liquid with a kitchen spoon.  There are pediatric measuring devices available at the pharmacy.  Ask for one when you get your prescription filled.   Store all mediations out of reach of children.  Call the Poison Control Center if you or your child takes too much of any medicine or if your child consumes your medication [phone].        You have an incision in your mouth along the upper gumline.  The sutures will dissolve.  Recommend a diet and soft diet For 2 weeks.  Please do not wear your dentures if you have them for 2 weeks  We will prescribe you a mouthwash called Peridex.  Please rinse your mouth with this 3 times daily for 10 days

## 2025-07-18 NOTE — ANESTHESIA POSTPROCEDURE EVALUATION
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    Anesthesia Post Evaluation    Patient location during evaluation: PACU  Patient participation: complete - patient participated  Level of consciousness: awake and alert  Pain management: adequate  Multimodal analgesia pain management approach  Airway patency: patent  Two or more strategies used to mitigate risk of obstructive sleep apnea  Cardiovascular status: acceptable and hemodynamically stable  Respiratory status: acceptable and spontaneous ventilation  Hydration status: acceptable  Postoperative Nausea and Vomiting: none        No notable events documented.

## 2025-07-18 NOTE — ANESTHESIA PREPROCEDURE EVALUATION
Patient: Christiano Masters    Procedure Information       Date/Time: 07/18/25 0920    Procedure: Endoscopic resection of sinonasl mass (Bilateral)    Location: [facility] OR 03 / [facility]    Surgeons: Maral MARTINEZ MD          74M with hx HTN, HLD, afib on eliquis (s/p CVN May 2025), JEFFREY (not using CPAP), GERD, hepatitis B/C (s/p treatment), prostate cancer (s/p radiation), squamous cell carcinoma of maxillary sinus presenting for above procedure.    Relevant Problems   Cardiac   (+) Atherosclerotic heart disease of native coronary artery without angina pectoris   (+) Atrial fibrillation (Multi) (S/p cardioversion May 2025, on metoprolol and eliquis)   (+) Hyperlipidemia (On pravastatin)   (+) Hypertension (On losartan-hydrochlorothiazide and amlodipine)      Pulmonary   (+) JEFFREY (obstructive sleep apnea) (Not using CPAP)      Neuro   (+) Anxiety   (+) Panic      GI   (+) Gastro-esophageal reflux disease without esophagitis      /Renal   (+) Adenocarcinoma of prostate (Multi)   (+) Malignant neoplasm of prostate (Multi)      Liver   (+) Hepatitis B (s/p Harvoni ([id number]) with neg VL and stable since)   (+) Hepatitis C, chronic (Multi) (S/p prior treatment)      Endocrine   (+) Drug-induced obesity      Hematology   (+) Long term (current) use of anticoagulants (On eliquis)      HEENT   (+) Inverted papilloma of maxillary sinus   (+) Squamous cell carcinoma of maxillary sinus      ID   (+) Hepatitis B (s/p Harvoni ([id number]) with neg VL and stable since)   (+) Hepatitis C, chronic (Multi) (S/p prior treatment)       Clinical information reviewed:                   NPO Detail:  No data recorded     Physical Exam    Airway  Mallampati: III  TM distance: >3 FB  Neck ROM: full  Mouth opening: 3 or more finger widths  Comments: Some facial hair, thick neck     Cardiovascular   Rhythm: regular  Rate: normal     Dental     (+) upper dentures  Comments: Upper complete dentures and lower partials      Pulmonary - normal exam   Abdominal          Echo 7/15/25:  CONCLUSIONS:   1. Left ventricular ejection fraction is normal calculated by Marley's biplane at 64%.   2. Spectral Doppler shows a Grade II (pseudonormal pattern) of left ventricular diastolic filling with an elevated left atrial pressure.   3. There is normal right ventricular global systolic function.   4. The Doppler estimated RVSP is mildly elevated at 35 mmHg.   5. The left atrium is mildly dilated.   6. Mild to moderate tricuspid regurgitation visualized.   7. Compared with study dated 5/27/2022, patient was previously on atrial fibrillation today in sinus rhythm with grade II LV filling. The RVSP has increased from 28 to 35 mmHg.      ECHO 5/27/22  1. The left ventricular systolic function is normal with a 60-65% estimated ejection fraction.   2. RVSP within normal limits.   3. There is mild to moderate tricuspid regurgitation.   4. There is moderate pulmonic valve regurgitation.   5. Compared with the prior exam from 7/29/2015 ([facility]) there are no significant changes except the trivial to small pericardial effusion is new.   6. The patient is in atrial fibrillation which may influence the estimate of left ventricular function and transvalvular flows.  7. There is normal right ventricular global systolic function.     Anesthesia Plan    History of general anesthesia?: yes  History of complications of general anesthesia?: no    ASA 3     general   (Seen by Dr. Vazquez on 6/19/25 who states patient is cleared for surgery. Eliquis can be stopped as he has normal renal function 48 hours prior to the surgery to be restarted as soon as no contraindications after the surgery.     Patient stopped eliquis appropriately 48 hours ago.    Patient used inhaler this morning)  intravenous induction   Postoperative pain plan includes opioids.  Trial extubation is planned.  Anesthetic plan and risks discussed with patient.  Use of blood products  discussed with patient who consented to blood products.    Plan discussed with attending.

## 2025-07-23 ENCOUNTER — APPOINTMENT (OUTPATIENT)
Dept: OTOLARYNGOLOGY | Facility: CLINIC | Age: 75
End: 2025-07-23
Payer: MEDICARE

## 2025-07-23 VITALS — BODY MASS INDEX: 31.56 KG/M2 | WEIGHT: 233 LBS | HEIGHT: 72 IN

## 2025-07-23 DIAGNOSIS — J34.89 NASAL OBSTRUCTION: ICD-10-CM

## 2025-07-23 DIAGNOSIS — J34.89 NASAL CRUSTING: ICD-10-CM

## 2025-07-23 DIAGNOSIS — D14.0 INVERTED PAPILLOMA OF MAXILLARY SINUS: Primary | ICD-10-CM

## 2025-07-23 PROCEDURE — 99024 POSTOP FOLLOW-UP VISIT: CPT | Performed by: OTOLARYNGOLOGY

## 2025-07-23 PROCEDURE — 31237 NSL/SINS NDSC SURG BX POLYPC: CPT | Performed by: OTOLARYNGOLOGY

## 2025-07-23 RX ORDER — OXYCODONE AND ACETAMINOPHEN 5; 325 MG/1; MG/1
1 TABLET ORAL EVERY 6 HOURS PRN
Qty: 5 TABLET | Refills: 0 | Status: SHIPPED | OUTPATIENT
Start: 2025-07-23 | End: 2025-07-23 | Stop reason: SDUPTHER

## 2025-07-23 RX ORDER — OXYCODONE AND ACETAMINOPHEN 5; 325 MG/1; MG/1
1 TABLET ORAL EVERY 6 HOURS PRN
Qty: 5 TABLET | Refills: 0 | Status: SHIPPED | OUTPATIENT
Start: 2025-07-23 | End: 2025-07-30

## 2025-07-24 RX ORDER — OXYCODONE AND ACETAMINOPHEN 5; 325 MG/1; MG/1
1 TABLET ORAL EVERY 6 HOURS PRN
Qty: 5 TABLET | Refills: 0 | Status: SHIPPED | OUTPATIENT
Start: 2025-07-24 | End: 2025-07-31

## 2025-07-24 NOTE — PROGRESS NOTES
"Referring Provider:  No referring provider defined for this encounter.      History of Present Illness:  History of Present Illness  The patient presents for a postoperative checkup. He is s/p combined open and endoscopic resection of a right maxillary inverted papilloma. He is accompanied by his daughter via phone.    He reports experiencing soreness and numbness on one side of his face, along with significant drainage from his nose. The drainage, which was initially red, has now turned clear and yellow, and tends to crust over during the night. He has been using saline rinses but did not use it yesterday due to excessive drainage. He also mentions a \"flap\" in his mouth that prevents him from using his dentures. Despite these issues, he notes an improvement in his breathing post-surgery. He was prescribed tramadol for pain management but discontinued its use due to itching. He continues to experience pain and was given Percocet, which he found effective.     ?  Review of Systems:     Review of symptoms was negative except for those stated including Cardiopulmonary, Genitourinary, Gastrointestinal, Psychological, Sleep pattern, Endocrine, Eyes, Neurologic, Musculoskeletal, Skin, Hematologic/Lymphatic and Allergic/Immunologic.     Medical History:     I have reviewed the patient's updated past medical history, surgical history, family history, social history, as well as current medications and allergies as of 5/6/2025. Changes to these items have been updated and marked as reviewed in the electronic medical record.     Physical Exam:  Physical Exam  General: Patient appears well and in no acute distress.  Vital signs: Vitals were not taken for this visit.  Psych: Pleasant affect, and answers questions appropriately.  Head & Face: Symmetric facial movements. Right V2 numbness  Eyes: Pupils equal, round, reactive. Extraocular movements intact without gaze restrictions or nystagmus. No epiphora.  Ears: External auditory " canals are normal. Tympanic membranes are clear. No middle ear effusion is seen. All middle ear landmarks are normal.  Nose: Clear and yellow drainage noted. Crusting present. Suctioning performed to remove crusts and drainage.  Oral Cavity/Oropharynx: Oral incisions healing well. Sutures in place. NO dehiscence.  Neck: Supple without lymphadenopathy.  Lungs: Non-labored, and without evidence of stridor.  Cardiac: Pulses are strong, well-perfused.  Extremities: Without gross evidence of clubbing, cyanosis, or edema.  Neuro: Cranial nerves II-XII grossly intact except right V2 hypoesthesia; Intact facial movements.      Procedure:  Sinonasal cavity debridement (87834)  Pre-procedure diagnosis: Sinonasal crusting/obstruction  Post-procedure diagnosis:  Sinonasal crusting/obstruction  Indication:  Remove nasal crusting, prevent synechiae    Verbal consent was obtained after informed discussion    Topical anesthetic was applied with a combination of 4% lidocaine spray and oxymetazoline. A 0 and 30-degree endoscope was used throughout the procedure.    Blood clots, debris, eschar and nasal crusting were removed from the middle meatus, maxillary sinus and ethmoid cavity.  A combination of straight and curved suctions, as well as grasping forceps were utilized.      Findings:  I meticulously debrided the right sinonasal cavity, removing large amounts of crusting and obstructing material. The maxillary sinus has not begun to remucosalize visibly yet. No purulence. No evidence of residual tumor.     The patient tolerated the procedure well.    Assessment & Plan  1. Inverted papilloma  Successful resection via endoscopic endonasal and Smith-Umesh approach. Recommend continued irrigations to assist with healing. Advised intra oral incision will heal and become less swollen with time. Discussed expected surveillance plan.        Maral Francisco MD, M.Eng.   of Otolaryngology - Head & Neck  Surgery  Division of Rhinology and Endoscopic Skull Base Surgery  [facility]     This medical note was created with the assistance of artificial intelligence (AI) for documentation purposes. The content has been reviewed and confirmed by the healthcare provider for accuracy and completeness. Patient consented to the use of audio recording and use of AI during their visit.

## 2025-08-06 ENCOUNTER — APPOINTMENT (OUTPATIENT)
Dept: OTOLARYNGOLOGY | Facility: CLINIC | Age: 75
End: 2025-08-06
Payer: MEDICARE

## 2025-08-06 VITALS — HEIGHT: 72 IN | BODY MASS INDEX: 31.56 KG/M2 | WEIGHT: 233 LBS

## 2025-08-06 DIAGNOSIS — D09.9 SQUAMOUS CELL CARCINOMA IN SITU: ICD-10-CM

## 2025-08-06 DIAGNOSIS — D14.0 INVERTED PAPILLOMA OF MAXILLARY SINUS: Primary | ICD-10-CM

## 2025-08-06 DIAGNOSIS — J34.89 NASAL OBSTRUCTION: ICD-10-CM

## 2025-08-06 RX ORDER — OXYCODONE AND ACETAMINOPHEN 5; 325 MG/1; MG/1
1 TABLET ORAL EVERY 6 HOURS PRN
Qty: 10 TABLET | Refills: 0 | Status: SHIPPED | OUTPATIENT
Start: 2025-08-06 | End: 2025-08-13

## 2025-08-07 NOTE — TUMOR BOARD NOTE
[facility] HEAD AND NECK TUMOR BOARD NOTE:    Christiano Masters Is a 74 y.o. male who was presented by Dr. Francisco at [facility] Head & Neck Tumor Board on 8/8/25 which included representatives from all Head & Neck disciplines (Medical oncology/Radiation oncology/Otolaryngology/Radiology/Pathology).     Multi-disciplinary Team:  Head and Neck Surgeon/ENT: Dr. 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 Favor clinical observation at this time.    Clinical Trial Status:   N/A        -----------------------------------------------------------------------------------------------------------------------------------------------------------------------------------------------------------------------    History and Physical in Brief:  Presenting to discuss postsurgical pathology and treatment plan. Previously presented on 5/30/25.     74 y.o. male who was seen by an outside ENT for unilateral nasal obstruction.  He was ultimately found to have a right nasal cavity mass.  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  He was referred to Dr. Francisco for additional management.  An in office scope showed this right nasal cavity mass extending into the middle meatus.     Physical Exam: 5/2025     Physical Exam:     Vitals:  vitals were not taken for this visit.   General: Patient doing well overall and is in no apparent distress.  Psych: Pleasant affect, and answers questions appropriately.  Head & Face: Symmetric facial  movements  Eyes: Pupils equal, round, reactive.  Extraocular movements intact without gaze restrictions or nystagmus. No epiphora.  Ears:  External auditory canals are normal.  Tympanic membranes are clear.  No middle ear effusion is seen.  All middle ear landmarks are normal.  Nose: A mass is present in the right cheek sinus, extending into the nasal cavity. The mass is blocking most of the space, causing congestion.  Oral Cavity/Oropharynx:  Without lesions or masses to visual exam.  Neck: Supple without lymphadenopathy.  Lungs: Non-labored, and without evidence of stridor.  Cardiac: Pulses are strong, well-perfused.  Extremities: Without gross evidence of clubbing, cyanosis, or edema.  Neuro: Cranial nerves II-XII grossly intact; Intact facial movements.    Imaging:  CT Sinus 4/2025  IMPRESSION:  1. The right maxillary sinus is completely opacified with material  extending into the right middle meatus as noted above. Mucocele is  not excluded.      2. Mild mucosal thickening and partial opacifications of the  paranasal sinuses elsewhere most prominent in the anterior right  ethmoid region.    Procedures to date:  7/18/2025: Combined endoscopic and open resection via Smith patience approach of right sided maxillary sinus mass with Dr. Francisco with findings of: extensive tumor throughout the maxillary sinus, middle meatus, and extending into the nasopharynx. Completely free of all opposing sites EXCEPT the anterior wall of the maxillary sinus which was resected en bloc via open maxillectomy approach.     Pertinent Pathology:    Surgical pathology from 7/18/2025  FINAL DIAGNOSIS   A.  NASAL MASS, RIGHT, EXCISION:  - Fragments of sinonasal papilloma, inverted type, with stromal xanthogranulomatous inflammation, see note.  - Fragments of bone with reactive changes.      Note: Sections of sinonasal papilloma, inverted type, show prominent stromal infiltrate of foamy histiocytes. AFB and GMS are negative for acid-fast  bacilli and fungal organisms, respectively. CD68 is positive in the histiocytes, while S100 is negative. Granulomas and significant chronic inflammation are not identified. The morphologic and stain findings favor a reactive process, however the exactly significance is unknown. Clinical correlation, and correlation with culture, if available, is recommended.      B.  MAXILLARY SINUS WALL, RIGHT ANTERIOR, EXCISION:  - Fragments of sinonasal papilloma, inverted type.  - Fragments of inflammatory sinonasal polyp(s).  - Fragments of bone with reactive changes.      C.  ANTERIOR WALL MUCOSA, EXCISION:  - Sinonasal mucosa with mild chronic inflammation, negative for inverted papilloma.     D.  MAXILLARY SINUS, RIGHT, LATERAL MARGIN, EXCISION:  - Sinonasal mucosa with chronic inflammation and edematous fibrovascular tissue, negative for inverted papilloma.     E.  RIGHT SINUS CONTENTS:  - Fragments of sinonasal papilloma, inverted type with multiple foci of at least high-grade dysplasia/squamous cell carcinoma in situ, see note.  - Stromal xanthogranulomatous reaction (see note in part A).   - Fragments of bone with reactive changes.      Note: Foci of at least high-grade dysplasia/squamous cell carcinoma in situ are seen in fragmented pieces. There are foci suspicious for, but not definitive for superficial invasion. Evaluation of definitive invasive carcinoma is difficult due to specimen fragmentation and tangential sectioning.  Clinical correlation is recommended. Multiple deeper levels are evaluated on several blocks.      Staff consultants: Marguerite Love and Marialuisa Agarwal.        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     Note: Multiple deeper levels were examined.        : Dr. Conrado Hurt               National site-specific guidelines were  discussed with respect to the case.

## 2025-08-08 ENCOUNTER — TUMOR BOARD CONFERENCE (OUTPATIENT)
Dept: HEMATOLOGY/ONCOLOGY | Facility: HOSPITAL | Age: 75
End: 2025-08-08
Payer: MEDICARE

## 2025-08-14 ENCOUNTER — OFFICE VISIT (OUTPATIENT)
Dept: PRIMARY CARE | Facility: CLINIC | Age: 75
End: 2025-08-14
Payer: MEDICARE

## 2025-08-14 VITALS
WEIGHT: 235 LBS | DIASTOLIC BLOOD PRESSURE: 68 MMHG | BODY MASS INDEX: 31.83 KG/M2 | HEART RATE: 60 BPM | HEIGHT: 72 IN | SYSTOLIC BLOOD PRESSURE: 130 MMHG | RESPIRATION RATE: 18 BRPM | OXYGEN SATURATION: 99 %

## 2025-08-14 DIAGNOSIS — J34.89 NASAL CAVITY MASS: ICD-10-CM

## 2025-08-14 DIAGNOSIS — E66.811 CLASS 1 OBESITY WITHOUT SERIOUS COMORBIDITY WITH BODY MASS INDEX (BMI) OF 31.0 TO 31.9 IN ADULT, UNSPECIFIED OBESITY TYPE: ICD-10-CM

## 2025-08-14 DIAGNOSIS — I10 HYPERTENSION, UNSPECIFIED TYPE: ICD-10-CM

## 2025-08-14 DIAGNOSIS — E78.5 HYPERLIPIDEMIA, UNSPECIFIED HYPERLIPIDEMIA TYPE: ICD-10-CM

## 2025-08-14 DIAGNOSIS — R73.9 HYPERGLYCEMIA: ICD-10-CM

## 2025-08-14 DIAGNOSIS — G62.9 NEUROPATHY: ICD-10-CM

## 2025-08-14 DIAGNOSIS — C61 ADENOCARCINOMA OF PROSTATE (MULTI): ICD-10-CM

## 2025-08-14 DIAGNOSIS — B18.2 CHRONIC HEPATITIS C WITHOUT HEPATIC COMA (MULTI): ICD-10-CM

## 2025-08-14 DIAGNOSIS — C61 PROSTATE CANCER (MULTI): ICD-10-CM

## 2025-08-14 DIAGNOSIS — I48.91 ATRIAL FIBRILLATION, UNSPECIFIED TYPE (MULTI): Primary | ICD-10-CM

## 2025-08-14 DIAGNOSIS — R60.9 EDEMA, UNSPECIFIED TYPE: ICD-10-CM

## 2025-08-14 PROCEDURE — 3078F DIAST BP <80 MM HG: CPT | Performed by: PHYSICIAN ASSISTANT

## 2025-08-14 PROCEDURE — 99214 OFFICE O/P EST MOD 30 MIN: CPT | Performed by: PHYSICIAN ASSISTANT

## 2025-08-14 PROCEDURE — 3075F SYST BP GE 130 - 139MM HG: CPT | Performed by: PHYSICIAN ASSISTANT

## 2025-08-14 PROCEDURE — 1125F AMNT PAIN NOTED PAIN PRSNT: CPT | Performed by: PHYSICIAN ASSISTANT

## 2025-08-14 PROCEDURE — 1160F RVW MEDS BY RX/DR IN RCRD: CPT | Performed by: PHYSICIAN ASSISTANT

## 2025-08-14 PROCEDURE — 3008F BODY MASS INDEX DOCD: CPT | Performed by: PHYSICIAN ASSISTANT

## 2025-08-14 PROCEDURE — 1159F MED LIST DOCD IN RCRD: CPT | Performed by: PHYSICIAN ASSISTANT

## 2025-08-14 PROCEDURE — G2211 COMPLEX E/M VISIT ADD ON: HCPCS | Performed by: PHYSICIAN ASSISTANT

## 2025-08-14 RX ORDER — POTASSIUM CHLORIDE 20 MEQ/1
20 TABLET, EXTENDED RELEASE ORAL DAILY
Qty: 14 TABLET | Refills: 0 | Status: SHIPPED | OUTPATIENT
Start: 2025-08-14 | End: 2025-08-28

## 2025-08-14 RX ORDER — METOPROLOL TARTRATE 25 MG/1
25 TABLET, FILM COATED ORAL 2 TIMES DAILY
Qty: 180 TABLET | Refills: 1 | Status: SHIPPED | OUTPATIENT
Start: 2025-08-14 | End: 2026-02-10

## 2025-08-14 RX ORDER — AMLODIPINE BESYLATE 10 MG/1
10 TABLET ORAL DAILY
Qty: 90 TABLET | Refills: 1 | Status: SHIPPED | OUTPATIENT
Start: 2025-08-14

## 2025-08-14 RX ORDER — DULOXETIN HYDROCHLORIDE 20 MG/1
20 CAPSULE, DELAYED RELEASE ORAL 2 TIMES DAILY
Qty: 60 CAPSULE | Refills: 0 | Status: SHIPPED | OUTPATIENT
Start: 2025-08-14 | End: 2025-09-13

## 2025-08-14 RX ORDER — FUROSEMIDE 20 MG/1
20 TABLET ORAL DAILY
Qty: 14 TABLET | Refills: 0 | Status: SHIPPED | OUTPATIENT
Start: 2025-08-14 | End: 2025-08-28

## 2025-08-14 RX ORDER — LOSARTAN POTASSIUM AND HYDROCHLOROTHIAZIDE 12.5; 1 MG/1; MG/1
1 TABLET ORAL DAILY
Qty: 90 TABLET | Refills: 1 | Status: SHIPPED | OUTPATIENT
Start: 2025-08-14

## 2025-08-14 ASSESSMENT — ENCOUNTER SYMPTOMS
DEPRESSION: 0
LOSS OF SENSATION IN FEET: 0
OCCASIONAL FEELINGS OF UNSTEADINESS: 0

## 2025-08-14 ASSESSMENT — PAIN SCALES - GENERAL: PAINLEVEL_OUTOF10: 8

## 2025-08-18 ENCOUNTER — TELEPHONE (OUTPATIENT)
Dept: PRIMARY CARE | Facility: CLINIC | Age: 75
End: 2025-08-18
Payer: MEDICARE

## 2025-08-18 DIAGNOSIS — E78.00 HYPERCHOLESTEROLEMIA: ICD-10-CM

## 2025-08-18 RX ORDER — PRAVASTATIN SODIUM 40 MG/1
40 TABLET ORAL NIGHTLY
Qty: 90 TABLET | Refills: 1 | Status: SHIPPED | OUTPATIENT
Start: 2025-08-18 | End: 2025-11-16

## 2025-09-03 ENCOUNTER — APPOINTMENT (OUTPATIENT)
Dept: OTOLARYNGOLOGY | Facility: CLINIC | Age: 75
End: 2025-09-03
Payer: MEDICARE

## 2025-09-16 ENCOUNTER — APPOINTMENT (OUTPATIENT)
Dept: UROLOGY | Facility: CLINIC | Age: 75
End: 2025-09-16
Payer: MEDICARE

## 2025-10-06 ENCOUNTER — APPOINTMENT (OUTPATIENT)
Dept: UROLOGY | Facility: HOSPITAL | Age: 75
End: 2025-10-06
Payer: MEDICARE

## (undated) DEVICE — MARKER, SKIN, DUAL TIP INK W/9 LABEL AND REMOVABLE TIME OUT SLEEVE

## (undated) DEVICE — DRILL, WIRE PASS MEDIUM, 2.0MM

## (undated) DEVICE — COVER, TABLE, 44 X 75 IN, DISPOSABLE, LF, STERILE

## (undated) DEVICE — COVER, CART, 45 X 27 X 48 IN, CLEAR

## (undated) DEVICE — CONTAINER, SPECIMEN, 120 ML, STERILE

## (undated) DEVICE — NEEDLE, SPINAL, QUINCKE, LUER LOCK, 18 G X 6 IN

## (undated) DEVICE — REST, HEAD, BAGEL, 9 IN

## (undated) DEVICE — DRAPE, FLUID WARMER

## (undated) DEVICE — DRESSING, NON-ADHERENT, TELFA, OUCHLESS, 3 X 8 IN, STERILE

## (undated) DEVICE — URONAV PROBE HOLDER, HITACHI FOR PP (FCS0147)

## (undated) DEVICE — PREP, SKIN, BETADINE, SCRUB, 32 OZ

## (undated) DEVICE — DRESSING, GAUZE, 16 PLY, 4 X 4 IN, STERILE

## (undated) DEVICE — PAD, ELECTRODE DEFIB PADPRO ADULT STRL

## (undated) DEVICE — Device

## (undated) DEVICE — ACCESS SYSTEM, PRECISIONPOINT, TRANSPERINEAL

## (undated) DEVICE — ELECTRODE, ELECTROSURGICAL, COAGULATOR, W/SUCTION, HANDSWITCHING, 8 FR, 6 IN

## (undated) DEVICE — PROBE COVER, ULTRASOUND 8818

## (undated) DEVICE — NEEDLE, BIOPSY, MAX CORE, 18G

## (undated) DEVICE — SYRINGE, 10 CC, LUER LOCK

## (undated) DEVICE — TIP, SUCTION, FRAZIER, W/CONTROL VENT, 10 FR

## (undated) DEVICE — TUBING, IRRIGATION FOR M4

## (undated) DEVICE — NEEDLE, HYPODERMIC, MONOJECT, 18 G X 1.5 IN

## (undated) DEVICE — DRAPE, INSTRUMENT, W/POUCH, STERI DRAPE, 7 X 11 IN, DISPOSABLE, STERILE

## (undated) DEVICE — TUBE, SALEM SUMP, 16 FR X 48IN, ENFIT

## (undated) DEVICE — SPINAL NEEDLE, 22G, 6"

## (undated) DEVICE — APPLICATOR, CHLORAPREP, W/ORANGE TINT, 26ML

## (undated) DEVICE — SYRINGE, 20 CC, LUER LOCK, MONOJECT, W/O CAP, LF

## (undated) DEVICE — TOWELS 4-PK